# Patient Record
Sex: FEMALE | Race: WHITE | NOT HISPANIC OR LATINO | Employment: OTHER | URBAN - METROPOLITAN AREA
[De-identification: names, ages, dates, MRNs, and addresses within clinical notes are randomized per-mention and may not be internally consistent; named-entity substitution may affect disease eponyms.]

---

## 2017-01-09 ENCOUNTER — ALLSCRIPTS OFFICE VISIT (OUTPATIENT)
Dept: OTHER | Facility: OTHER | Age: 82
End: 2017-01-09

## 2017-01-09 ENCOUNTER — APPOINTMENT (OUTPATIENT)
Dept: LAB | Facility: HOSPITAL | Age: 82
End: 2017-01-09
Payer: COMMERCIAL

## 2017-01-09 DIAGNOSIS — D50.0 IRON DEFICIENCY ANEMIA DUE TO CHRONIC BLOOD LOSS: ICD-10-CM

## 2017-01-09 DIAGNOSIS — I10 ESSENTIAL (PRIMARY) HYPERTENSION: ICD-10-CM

## 2017-01-09 DIAGNOSIS — E03.9 HYPOTHYROIDISM: ICD-10-CM

## 2017-01-09 DIAGNOSIS — I77.6 ARTERITIS (HCC): ICD-10-CM

## 2017-01-09 DIAGNOSIS — Z01.818 ENCOUNTER FOR OTHER PREPROCEDURAL EXAMINATION: ICD-10-CM

## 2017-01-09 DIAGNOSIS — C16.9 MALIGNANT NEOPLASM OF STOMACH (HCC): ICD-10-CM

## 2017-01-09 LAB
ALBUMIN SERPL BCP-MCNC: 4 G/DL (ref 3.5–5)
ALP SERPL-CCNC: 80 U/L (ref 46–116)
ALT SERPL W P-5'-P-CCNC: 22 U/L (ref 12–78)
ANION GAP SERPL CALCULATED.3IONS-SCNC: 7 MMOL/L (ref 4–13)
AST SERPL W P-5'-P-CCNC: 22 U/L (ref 5–45)
BASOPHILS # BLD AUTO: 0.05 THOUSANDS/ΜL (ref 0–0.1)
BASOPHILS NFR BLD AUTO: 1 % (ref 0–1)
BILIRUB SERPL-MCNC: 0.41 MG/DL (ref 0.2–1)
BUN SERPL-MCNC: 17 MG/DL (ref 5–25)
CALCIUM SERPL-MCNC: 9.6 MG/DL (ref 8.3–10.1)
CHLORIDE SERPL-SCNC: 97 MMOL/L (ref 100–108)
CO2 SERPL-SCNC: 31 MMOL/L (ref 21–32)
CREAT SERPL-MCNC: 0.74 MG/DL (ref 0.6–1.3)
EOSINOPHIL # BLD AUTO: 0.23 THOUSAND/ΜL (ref 0–0.61)
EOSINOPHIL NFR BLD AUTO: 4 % (ref 0–6)
ERYTHROCYTE [DISTWIDTH] IN BLOOD BY AUTOMATED COUNT: 13.5 % (ref 11.6–15.1)
GFR SERPL CREATININE-BSD FRML MDRD: >60 ML/MIN/1.73SQ M
GLUCOSE SERPL-MCNC: 123 MG/DL (ref 65–140)
HCT VFR BLD AUTO: 36.3 % (ref 34.8–46.1)
HGB BLD-MCNC: 12 G/DL (ref 11.5–15.4)
LYMPHOCYTES # BLD AUTO: 1.27 THOUSANDS/ΜL (ref 0.6–4.47)
LYMPHOCYTES NFR BLD AUTO: 20 % (ref 14–44)
MCH RBC QN AUTO: 29.6 PG (ref 26.8–34.3)
MCHC RBC AUTO-ENTMCNC: 33.1 G/DL (ref 31.4–37.4)
MCV RBC AUTO: 89 FL (ref 82–98)
MONOCYTES # BLD AUTO: 0.73 THOUSAND/ΜL (ref 0.17–1.22)
MONOCYTES NFR BLD AUTO: 11 % (ref 4–12)
NEUTROPHILS # BLD AUTO: 4.1 THOUSANDS/ΜL (ref 1.85–7.62)
NEUTS SEG NFR BLD AUTO: 64 % (ref 43–75)
NRBC BLD AUTO-RTO: 0 /100 WBCS
PLATELET # BLD AUTO: 218 THOUSANDS/UL (ref 149–390)
PMV BLD AUTO: 9.6 FL (ref 8.9–12.7)
POTASSIUM SERPL-SCNC: 4.1 MMOL/L (ref 3.5–5.3)
PROT SERPL-MCNC: 7.7 G/DL (ref 6.4–8.2)
RBC # BLD AUTO: 4.06 MILLION/UL (ref 3.81–5.12)
SODIUM SERPL-SCNC: 135 MMOL/L (ref 136–145)
WBC # BLD AUTO: 6.39 THOUSAND/UL (ref 4.31–10.16)

## 2017-01-09 PROCEDURE — 85025 COMPLETE CBC W/AUTO DIFF WBC: CPT

## 2017-01-09 PROCEDURE — 36415 COLL VENOUS BLD VENIPUNCTURE: CPT

## 2017-01-09 PROCEDURE — 80053 COMPREHEN METABOLIC PANEL: CPT

## 2017-01-14 ENCOUNTER — GENERIC CONVERSION - ENCOUNTER (OUTPATIENT)
Dept: OTHER | Facility: OTHER | Age: 82
End: 2017-01-14

## 2017-01-17 ENCOUNTER — GENERIC CONVERSION - ENCOUNTER (OUTPATIENT)
Dept: OTHER | Facility: OTHER | Age: 82
End: 2017-01-17

## 2017-01-23 ENCOUNTER — GENERIC CONVERSION - ENCOUNTER (OUTPATIENT)
Dept: OTHER | Facility: OTHER | Age: 82
End: 2017-01-23

## 2017-02-07 ENCOUNTER — GENERIC CONVERSION - ENCOUNTER (OUTPATIENT)
Dept: OTHER | Facility: OTHER | Age: 82
End: 2017-02-07

## 2017-02-27 ENCOUNTER — ALLSCRIPTS OFFICE VISIT (OUTPATIENT)
Dept: OTHER | Facility: OTHER | Age: 82
End: 2017-02-27

## 2017-02-27 DIAGNOSIS — E03.9 HYPOTHYROIDISM: ICD-10-CM

## 2017-02-27 DIAGNOSIS — M25.561 PAIN IN RIGHT KNEE: ICD-10-CM

## 2017-03-09 ENCOUNTER — LAB REQUISITION (OUTPATIENT)
Dept: LAB | Facility: HOSPITAL | Age: 82
End: 2017-03-09
Payer: COMMERCIAL

## 2017-03-09 DIAGNOSIS — C16.3 MALIGNANT NEOPLASM OF PYLORIC ANTRUM (HCC): ICD-10-CM

## 2017-03-09 LAB
ALBUMIN SERPL BCP-MCNC: 3.7 G/DL (ref 3.5–5)
ALP SERPL-CCNC: 93 U/L (ref 46–116)
ALT SERPL W P-5'-P-CCNC: 16 U/L (ref 12–78)
ANION GAP SERPL CALCULATED.3IONS-SCNC: 8 MMOL/L (ref 4–13)
AST SERPL W P-5'-P-CCNC: 19 U/L (ref 5–45)
BASOPHILS # BLD AUTO: 0.06 THOUSANDS/ΜL (ref 0–0.1)
BASOPHILS NFR BLD AUTO: 1 % (ref 0–1)
BILIRUB SERPL-MCNC: 0.33 MG/DL (ref 0.2–1)
BUN SERPL-MCNC: 18 MG/DL (ref 5–25)
CALCIUM SERPL-MCNC: 9.2 MG/DL (ref 8.3–10.1)
CHLORIDE SERPL-SCNC: 99 MMOL/L (ref 100–108)
CO2 SERPL-SCNC: 27 MMOL/L (ref 21–32)
CREAT SERPL-MCNC: 0.79 MG/DL (ref 0.6–1.3)
EOSINOPHIL # BLD AUTO: 0.37 THOUSAND/ΜL (ref 0–0.61)
EOSINOPHIL NFR BLD AUTO: 7 % (ref 0–6)
ERYTHROCYTE [DISTWIDTH] IN BLOOD BY AUTOMATED COUNT: 15.2 % (ref 11.6–15.1)
FERRITIN SERPL-MCNC: 217 NG/ML (ref 8–388)
GFR SERPL CREATININE-BSD FRML MDRD: >60 ML/MIN/1.73SQ M
GLUCOSE SERPL-MCNC: 92 MG/DL (ref 65–140)
HCT VFR BLD AUTO: 33.1 % (ref 34.8–46.1)
HGB BLD-MCNC: 10.5 G/DL (ref 11.5–15.4)
LYMPHOCYTES # BLD AUTO: 0.99 THOUSANDS/ΜL (ref 0.6–4.47)
LYMPHOCYTES NFR BLD AUTO: 18 % (ref 14–44)
MCH RBC QN AUTO: 28.7 PG (ref 26.8–34.3)
MCHC RBC AUTO-ENTMCNC: 31.7 G/DL (ref 31.4–37.4)
MCV RBC AUTO: 90 FL (ref 82–98)
MONOCYTES # BLD AUTO: 0.67 THOUSAND/ΜL (ref 0.17–1.22)
MONOCYTES NFR BLD AUTO: 12 % (ref 4–12)
NEUTROPHILS # BLD AUTO: 3.5 THOUSANDS/ΜL (ref 1.85–7.62)
NEUTS SEG NFR BLD AUTO: 62 % (ref 43–75)
NRBC BLD AUTO-RTO: 0 /100 WBCS
PLATELET # BLD AUTO: 265 THOUSANDS/UL (ref 149–390)
PMV BLD AUTO: 9.3 FL (ref 8.9–12.7)
POTASSIUM SERPL-SCNC: 4.7 MMOL/L (ref 3.5–5.3)
PROT SERPL-MCNC: 7.3 G/DL (ref 6.4–8.2)
RBC # BLD AUTO: 3.66 MILLION/UL (ref 3.81–5.12)
SODIUM SERPL-SCNC: 134 MMOL/L (ref 136–145)
VIT B12 SERPL-MCNC: 559 PG/ML (ref 100–900)
WBC # BLD AUTO: 5.6 THOUSAND/UL (ref 4.31–10.16)

## 2017-03-09 PROCEDURE — 85025 COMPLETE CBC W/AUTO DIFF WBC: CPT | Performed by: INTERNAL MEDICINE

## 2017-03-09 PROCEDURE — 82607 VITAMIN B-12: CPT | Performed by: INTERNAL MEDICINE

## 2017-03-09 PROCEDURE — 80053 COMPREHEN METABOLIC PANEL: CPT | Performed by: INTERNAL MEDICINE

## 2017-03-09 PROCEDURE — 82728 ASSAY OF FERRITIN: CPT | Performed by: INTERNAL MEDICINE

## 2017-05-04 ENCOUNTER — ALLSCRIPTS OFFICE VISIT (OUTPATIENT)
Dept: OTHER | Facility: OTHER | Age: 82
End: 2017-05-04

## 2017-05-04 ENCOUNTER — APPOINTMENT (OUTPATIENT)
Dept: LAB | Facility: HOSPITAL | Age: 82
End: 2017-05-04
Payer: COMMERCIAL

## 2017-05-04 DIAGNOSIS — R60.0 LOCALIZED EDEMA: ICD-10-CM

## 2017-05-04 DIAGNOSIS — D50.0 IRON DEFICIENCY ANEMIA DUE TO CHRONIC BLOOD LOSS: ICD-10-CM

## 2017-05-04 DIAGNOSIS — I10 ESSENTIAL (PRIMARY) HYPERTENSION: ICD-10-CM

## 2017-05-04 LAB
ANION GAP SERPL CALCULATED.3IONS-SCNC: 6 MMOL/L (ref 4–13)
BUN SERPL-MCNC: 14 MG/DL (ref 5–25)
CALCIUM SERPL-MCNC: 9.3 MG/DL (ref 8.3–10.1)
CHLORIDE SERPL-SCNC: 96 MMOL/L (ref 100–108)
CO2 SERPL-SCNC: 30 MMOL/L (ref 21–32)
CREAT SERPL-MCNC: 0.74 MG/DL (ref 0.6–1.3)
GFR SERPL CREATININE-BSD FRML MDRD: >60 ML/MIN/1.73SQ M
GLUCOSE P FAST SERPL-MCNC: 166 MG/DL (ref 65–99)
NT-PROBNP SERPL-MCNC: 983 PG/ML
POTASSIUM SERPL-SCNC: 3.9 MMOL/L (ref 3.5–5.3)
SODIUM SERPL-SCNC: 132 MMOL/L (ref 136–145)

## 2017-05-04 PROCEDURE — 36415 COLL VENOUS BLD VENIPUNCTURE: CPT

## 2017-05-04 PROCEDURE — 83880 ASSAY OF NATRIURETIC PEPTIDE: CPT

## 2017-05-04 PROCEDURE — 80048 BASIC METABOLIC PNL TOTAL CA: CPT

## 2017-05-08 ENCOUNTER — HOSPITAL ENCOUNTER (OUTPATIENT)
Dept: NON INVASIVE DIAGNOSTICS | Facility: CLINIC | Age: 82
Discharge: HOME | End: 2017-05-08
Payer: COMMERCIAL

## 2017-05-08 DIAGNOSIS — R60.0 LOCALIZED EDEMA: ICD-10-CM

## 2017-05-08 DIAGNOSIS — I10 ESSENTIAL (PRIMARY) HYPERTENSION: ICD-10-CM

## 2017-05-08 PROCEDURE — 93970 EXTREMITY STUDY: CPT

## 2017-05-11 ENCOUNTER — ALLSCRIPTS OFFICE VISIT (OUTPATIENT)
Dept: OTHER | Facility: OTHER | Age: 82
End: 2017-05-11

## 2017-06-15 ENCOUNTER — LAB REQUISITION (OUTPATIENT)
Dept: LAB | Facility: HOSPITAL | Age: 82
End: 2017-06-15
Payer: COMMERCIAL

## 2017-06-15 DIAGNOSIS — M25.561 PAIN IN RIGHT KNEE: ICD-10-CM

## 2017-06-15 DIAGNOSIS — E03.9 HYPOTHYROIDISM: ICD-10-CM

## 2017-06-15 LAB
ANION GAP SERPL CALCULATED.3IONS-SCNC: 8 MMOL/L (ref 4–13)
BUN SERPL-MCNC: 10 MG/DL (ref 5–25)
CALCIUM SERPL-MCNC: 9.3 MG/DL (ref 8.3–10.1)
CHLORIDE SERPL-SCNC: 97 MMOL/L (ref 100–108)
CO2 SERPL-SCNC: 29 MMOL/L (ref 21–32)
CREAT SERPL-MCNC: 0.63 MG/DL (ref 0.6–1.3)
ERYTHROCYTE [DISTWIDTH] IN BLOOD BY AUTOMATED COUNT: 15.1 % (ref 11.6–15.1)
GFR SERPL CREATININE-BSD FRML MDRD: >60 ML/MIN/1.73SQ M
GLUCOSE SERPL-MCNC: 168 MG/DL (ref 65–140)
HCT VFR BLD AUTO: 36 % (ref 34.8–46.1)
HGB BLD-MCNC: 11.5 G/DL (ref 11.5–15.4)
MCH RBC QN AUTO: 28.3 PG (ref 26.8–34.3)
MCHC RBC AUTO-ENTMCNC: 31.9 G/DL (ref 31.4–37.4)
MCV RBC AUTO: 89 FL (ref 82–98)
PLATELET # BLD AUTO: 221 THOUSANDS/UL (ref 149–390)
PMV BLD AUTO: 9.9 FL (ref 8.9–12.7)
POTASSIUM SERPL-SCNC: 4.3 MMOL/L (ref 3.5–5.3)
RBC # BLD AUTO: 4.06 MILLION/UL (ref 3.81–5.12)
SODIUM SERPL-SCNC: 134 MMOL/L (ref 136–145)
TSH SERPL DL<=0.05 MIU/L-ACNC: 1.35 UIU/ML (ref 0.36–3.74)
WBC # BLD AUTO: 4.65 THOUSAND/UL (ref 4.31–10.16)

## 2017-06-15 PROCEDURE — 84443 ASSAY THYROID STIM HORMONE: CPT | Performed by: INTERNAL MEDICINE

## 2017-06-15 PROCEDURE — 85027 COMPLETE CBC AUTOMATED: CPT | Performed by: INTERNAL MEDICINE

## 2017-06-15 PROCEDURE — 80048 BASIC METABOLIC PNL TOTAL CA: CPT | Performed by: INTERNAL MEDICINE

## 2017-06-19 ENCOUNTER — ALLSCRIPTS OFFICE VISIT (OUTPATIENT)
Dept: OTHER | Facility: OTHER | Age: 82
End: 2017-06-19

## 2017-06-19 DIAGNOSIS — E78.5 HYPERLIPIDEMIA: ICD-10-CM

## 2017-06-19 DIAGNOSIS — E87.1 HYPO-OSMOLALITY AND HYPONATREMIA: ICD-10-CM

## 2017-06-19 DIAGNOSIS — R60.0 LOCALIZED EDEMA: ICD-10-CM

## 2017-06-19 DIAGNOSIS — I10 ESSENTIAL (PRIMARY) HYPERTENSION: ICD-10-CM

## 2017-06-19 DIAGNOSIS — R73.9 HYPERGLYCEMIA: ICD-10-CM

## 2017-07-27 ENCOUNTER — ALLSCRIPTS OFFICE VISIT (OUTPATIENT)
Dept: OTHER | Facility: OTHER | Age: 82
End: 2017-07-27

## 2017-07-27 ENCOUNTER — TRANSCRIBE ORDERS (OUTPATIENT)
Dept: LAB | Facility: HOSPITAL | Age: 82
End: 2017-07-27

## 2017-07-27 ENCOUNTER — APPOINTMENT (OUTPATIENT)
Dept: LAB | Facility: HOSPITAL | Age: 82
End: 2017-07-27
Attending: INTERNAL MEDICINE
Payer: COMMERCIAL

## 2017-07-27 DIAGNOSIS — R60.0 LOCALIZED EDEMA: ICD-10-CM

## 2017-07-27 DIAGNOSIS — I10 ESSENTIAL (PRIMARY) HYPERTENSION: ICD-10-CM

## 2017-07-27 DIAGNOSIS — E87.1 HYPO-OSMOLALITY AND HYPONATREMIA: ICD-10-CM

## 2017-07-27 DIAGNOSIS — E78.5 HYPERLIPIDEMIA: ICD-10-CM

## 2017-07-27 DIAGNOSIS — C16.9 MALIGNANT NEOPLASM OF STOMACH, UNSPECIFIED LOCATION (HCC): Primary | ICD-10-CM

## 2017-07-27 DIAGNOSIS — I87.321 IDIOPATHIC CHRONIC VENOUS HYPERTENSION OF RIGHT LOWER EXTREMITY WITH INFLAMMATION: ICD-10-CM

## 2017-07-27 DIAGNOSIS — R73.9 HYPERGLYCEMIA: ICD-10-CM

## 2017-07-27 LAB
ANION GAP SERPL CALCULATED.3IONS-SCNC: 9 MMOL/L (ref 4–13)
BUN SERPL-MCNC: 11 MG/DL (ref 5–25)
CALCIUM SERPL-MCNC: 9.2 MG/DL (ref 8.3–10.1)
CEA SERPL-MCNC: <0.5 NG/ML (ref 0–3)
CHLORIDE SERPL-SCNC: 93 MMOL/L (ref 100–108)
CO2 SERPL-SCNC: 28 MMOL/L (ref 21–32)
CREAT SERPL-MCNC: 0.55 MG/DL (ref 0.6–1.3)
ERYTHROCYTE [DISTWIDTH] IN BLOOD BY AUTOMATED COUNT: 13.8 % (ref 11.6–15.1)
EST. AVERAGE GLUCOSE BLD GHB EST-MCNC: 128 MG/DL
GFR SERPL CREATININE-BSD FRML MDRD: 85 ML/MIN/1.73SQ M
GLUCOSE P FAST SERPL-MCNC: 91 MG/DL (ref 65–99)
HBA1C MFR BLD: 6.1 % (ref 4.2–6.3)
HCT VFR BLD AUTO: 35.1 % (ref 34.8–46.1)
HGB BLD-MCNC: 11.6 G/DL (ref 11.5–15.4)
MCH RBC QN AUTO: 29 PG (ref 26.8–34.3)
MCHC RBC AUTO-ENTMCNC: 33 G/DL (ref 31.4–37.4)
MCV RBC AUTO: 88 FL (ref 82–98)
PLATELET # BLD AUTO: 222 THOUSANDS/UL (ref 149–390)
PMV BLD AUTO: 9.4 FL (ref 8.9–12.7)
POTASSIUM SERPL-SCNC: 3.8 MMOL/L (ref 3.5–5.3)
RBC # BLD AUTO: 4 MILLION/UL (ref 3.81–5.12)
SODIUM SERPL-SCNC: 130 MMOL/L (ref 136–145)
WBC # BLD AUTO: 5.11 THOUSAND/UL (ref 4.31–10.16)

## 2017-07-27 PROCEDURE — 80048 BASIC METABOLIC PNL TOTAL CA: CPT

## 2017-07-27 PROCEDURE — 36415 COLL VENOUS BLD VENIPUNCTURE: CPT

## 2017-07-27 PROCEDURE — 86301 IMMUNOASSAY TUMOR CA 19-9: CPT

## 2017-07-27 PROCEDURE — 82378 CARCINOEMBRYONIC ANTIGEN: CPT

## 2017-07-27 PROCEDURE — 85027 COMPLETE CBC AUTOMATED: CPT

## 2017-07-27 PROCEDURE — 83036 HEMOGLOBIN GLYCOSYLATED A1C: CPT

## 2017-07-28 LAB — CANCER AG19-9 SERPL-ACNC: 21 U/ML (ref 0–35)

## 2017-08-23 ENCOUNTER — APPOINTMENT (OUTPATIENT)
Dept: LAB | Facility: HOSPITAL | Age: 82
End: 2017-08-23
Attending: INTERNAL MEDICINE
Payer: COMMERCIAL

## 2017-08-23 DIAGNOSIS — I87.321 IDIOPATHIC CHRONIC VENOUS HYPERTENSION OF RIGHT LOWER EXTREMITY WITH INFLAMMATION: ICD-10-CM

## 2017-08-23 DIAGNOSIS — I10 ESSENTIAL (PRIMARY) HYPERTENSION: ICD-10-CM

## 2017-08-23 DIAGNOSIS — R60.0 LOCALIZED EDEMA: ICD-10-CM

## 2017-08-23 LAB
ANION GAP SERPL CALCULATED.3IONS-SCNC: 8 MMOL/L (ref 4–13)
BUN SERPL-MCNC: 19 MG/DL (ref 5–25)
CALCIUM SERPL-MCNC: 9.1 MG/DL (ref 8.3–10.1)
CHLORIDE SERPL-SCNC: 96 MMOL/L (ref 100–108)
CO2 SERPL-SCNC: 29 MMOL/L (ref 21–32)
CREAT SERPL-MCNC: 0.65 MG/DL (ref 0.6–1.3)
GFR SERPL CREATININE-BSD FRML MDRD: 81 ML/MIN/1.73SQ M
GLUCOSE P FAST SERPL-MCNC: 80 MG/DL (ref 65–99)
POTASSIUM SERPL-SCNC: 4 MMOL/L (ref 3.5–5.3)
SODIUM SERPL-SCNC: 133 MMOL/L (ref 136–145)

## 2017-08-23 PROCEDURE — 80048 BASIC METABOLIC PNL TOTAL CA: CPT

## 2017-08-23 PROCEDURE — 36415 COLL VENOUS BLD VENIPUNCTURE: CPT

## 2017-08-28 ENCOUNTER — ALLSCRIPTS OFFICE VISIT (OUTPATIENT)
Dept: OTHER | Facility: OTHER | Age: 82
End: 2017-08-28

## 2017-08-28 DIAGNOSIS — E78.5 HYPERLIPIDEMIA: ICD-10-CM

## 2017-08-28 DIAGNOSIS — E87.1 HYPO-OSMOLALITY AND HYPONATREMIA: ICD-10-CM

## 2017-08-28 DIAGNOSIS — E03.9 HYPOTHYROIDISM: ICD-10-CM

## 2017-08-28 DIAGNOSIS — I10 ESSENTIAL (PRIMARY) HYPERTENSION: ICD-10-CM

## 2017-09-27 ENCOUNTER — GENERIC CONVERSION - ENCOUNTER (OUTPATIENT)
Dept: OTHER | Facility: OTHER | Age: 82
End: 2017-09-27

## 2017-11-27 ENCOUNTER — GENERIC CONVERSION - ENCOUNTER (OUTPATIENT)
Dept: OTHER | Facility: OTHER | Age: 82
End: 2017-11-27

## 2017-12-13 ENCOUNTER — APPOINTMENT (OUTPATIENT)
Dept: LAB | Facility: HOSPITAL | Age: 82
End: 2017-12-13
Attending: INTERNAL MEDICINE
Payer: COMMERCIAL

## 2017-12-13 ENCOUNTER — GENERIC CONVERSION - ENCOUNTER (OUTPATIENT)
Dept: INTERNAL MEDICINE CLINIC | Facility: CLINIC | Age: 82
End: 2017-12-13

## 2017-12-13 DIAGNOSIS — E87.1 HYPO-OSMOLALITY AND HYPONATREMIA: ICD-10-CM

## 2017-12-13 DIAGNOSIS — E78.5 HYPERLIPIDEMIA: ICD-10-CM

## 2017-12-13 DIAGNOSIS — I10 ESSENTIAL (PRIMARY) HYPERTENSION: ICD-10-CM

## 2017-12-13 DIAGNOSIS — E03.9 HYPOTHYROIDISM: ICD-10-CM

## 2017-12-13 LAB
ANION GAP SERPL CALCULATED.3IONS-SCNC: 7 MMOL/L (ref 4–13)
BUN SERPL-MCNC: 17 MG/DL (ref 5–25)
CALCIUM SERPL-MCNC: 9.7 MG/DL (ref 8.3–10.1)
CHLORIDE SERPL-SCNC: 97 MMOL/L (ref 100–108)
CO2 SERPL-SCNC: 31 MMOL/L (ref 21–32)
CREAT SERPL-MCNC: 0.66 MG/DL (ref 0.6–1.3)
ERYTHROCYTE [DISTWIDTH] IN BLOOD BY AUTOMATED COUNT: 13.5 % (ref 11.6–15.1)
GFR SERPL CREATININE-BSD FRML MDRD: 80 ML/MIN/1.73SQ M
GLUCOSE SERPL-MCNC: 106 MG/DL (ref 65–140)
HCT VFR BLD AUTO: 37.5 % (ref 34.8–46.1)
HGB BLD-MCNC: 12.4 G/DL (ref 11.5–15.4)
MCH RBC QN AUTO: 30.2 PG (ref 26.8–34.3)
MCHC RBC AUTO-ENTMCNC: 33.1 G/DL (ref 31.4–37.4)
MCV RBC AUTO: 92 FL (ref 82–98)
PLATELET # BLD AUTO: 246 THOUSANDS/UL (ref 149–390)
PMV BLD AUTO: 10 FL (ref 8.9–12.7)
POTASSIUM SERPL-SCNC: 3.8 MMOL/L (ref 3.5–5.3)
RBC # BLD AUTO: 4.1 MILLION/UL (ref 3.81–5.12)
SODIUM SERPL-SCNC: 135 MMOL/L (ref 136–145)
TSH SERPL DL<=0.05 MIU/L-ACNC: 2.29 UIU/ML (ref 0.36–3.74)
WBC # BLD AUTO: 5.93 THOUSAND/UL (ref 4.31–10.16)

## 2017-12-13 PROCEDURE — 84443 ASSAY THYROID STIM HORMONE: CPT

## 2017-12-13 PROCEDURE — 85027 COMPLETE CBC AUTOMATED: CPT

## 2017-12-13 PROCEDURE — 80048 BASIC METABOLIC PNL TOTAL CA: CPT

## 2017-12-13 PROCEDURE — 36415 COLL VENOUS BLD VENIPUNCTURE: CPT

## 2017-12-19 ENCOUNTER — GENERIC CONVERSION - ENCOUNTER (OUTPATIENT)
Dept: OTHER | Facility: OTHER | Age: 82
End: 2017-12-19

## 2017-12-19 DIAGNOSIS — R73.9 HYPERGLYCEMIA: ICD-10-CM

## 2017-12-19 DIAGNOSIS — M17.10 PRIMARY OSTEOARTHRITIS OF ONE KNEE: ICD-10-CM

## 2017-12-19 DIAGNOSIS — I10 ESSENTIAL (PRIMARY) HYPERTENSION: ICD-10-CM

## 2017-12-19 DIAGNOSIS — E87.1 HYPO-OSMOLALITY AND HYPONATREMIA (CODE): ICD-10-CM

## 2018-01-10 NOTE — MISCELLANEOUS
History of Present Illness  TCM Communication [facility]: The patient is being contacted for follow-up after hospitalization and Pt's daughter declined to schedule GLADIS at this time  Daughter will call backat a later date  House visit offered and declined as well  She was hospitalized at [facility]  The date of admission: 1/19/17, date of discharge: 2/5/17  Diagnosis: ambulatory dysfunction, s/p TKR, labile blood pressure, primary osteoarthritis of right knee, hypothyroidism, HTN, GERD, venous insufficiency  She was discharged to home  Medications reviewed and updated today  She did not schedule a follow up appointment  She refused a follow up appointment due to Pt's daughter declined  Pt will be following up with her surgeon, has VNA and PT  Follow-up appointments with other specialists: "knee" doctor, PT  The patient is currently asymptomatic  Counseling was provided to patient's family  daughter  Communication performed and completed by nanda      Active Problems    1  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)   2  Anemia due to blood loss, chronic (280 0) (D50 0)   3  Benign essential hypertension (401 1) (I10)   4  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)   5  Encounter for pre-operative examination (V72 84) (Z01 818)   6  Hyperlipidemia (272 4) (E78 5)   7  Hyponatremia (276 1) (E87 1)   8  Hypothyroidism (244 9) (E03 9)   9  Knee pain, right (719 46) (M25 561)   10  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)   11  Osteoarthrosis of knee (715 36) (M17 9)   12  Vasculitis (447 6) (I77 6)    Past Medical History    1  History of Anemia (285 9) (D64 9)   2  History of Arthritis (V13 4)   3  History of Bilateral hearing loss (389 9) (H91 93)   4  History of Bilateral impacted cerumen (380 4) (H61 23)   5  History of Carcinoma Of The Stomach (V10 04)   6  History of Gastric Cancer (V10 04)   7  History of anemia (V12 3) (Z86 2)   8  History of fall (V15 88) (Z91 81)   9   History of fatigue (V13 89) (I84 963) 10  History of hypercholesterolemia (V12 29) (Z86 39)   11  History of hyperlipidemia (V12 29) (Z86 39)   12  History of hypertension (V12 59) (Z86 79)   13  History of impacted cerumen (V12 49) (Z86 69)   14  History of 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 3)   15  History of Senile Cataract (366 10)   16  History of Situational depression (309 0) (F43 21)   17  History of Urinary frequency (788 41) (R35 0)    Surgical History    1  History of Gastric Surgery   2  History of Total Knee Arthroplasty    Family History  Mother    1  Family history of Diabetes Mellitus (V18 0)   2  Family history of Heart Disease (V17 49)  Father    3  Family history of Prostate Cancer (V16 42)    Social History    · Daily Coffee Consumption (1  Cups/Day)   · Never A Smoker   · No alcohol use   · No illicit drug use    Current Meds   1  Acetaminophen 500 MG Oral Tablet; Take 1 tablet 4 times daily; Therapy: (Recorded:54Asu9152) to Recorded   2  AmLODIPine Besylate 5 MG Oral Tablet; take 0 5 tablet daily; Therapy: 34SBT2350 to (Jesica Dorado)  Requested for: 55HRR7053 Recorded   3  AmLODIPine Besylate 5 MG Oral Tablet; Take 1 tablet twice daily; Therapy: (Recorded:81Seu3506) to Recorded   4  Bystolic 10 MG Oral Tablet; Take 1 tablet daily  Requested for: 42SEE6775; Last   Rx:06Jan2017 Ordered   5  Centrum Silver Oral Tablet; TAKE 1 TABLET DAILY; Therapy: (Recorded:32Fxs6269) to Recorded   6  Ciloxan 0 3 % Ophthalmic Solution; take as directed; Therapy: 70SNZ1076 to Recorded   7  Fondaparinux Sodium 2 5 MG/0 5ML Subcutaneous Solution; INJECT 0 5 ML Daily 13   DOSES; Therapy: (Recorded:32Ksl5096) to Recorded   8  Levothyroxine Sodium 25 MCG Oral Tablet; TAKE 1 TABLET DAILY  Requested for:   20CNR5784; Last Rx:03Oct2016 Ordered   9  Nasacort Allergy 24HR 55 MCG/ACT Nasal Aerosol; To be used as directed; Therapy: 58VUR3215 to Recorded   10  Omeprazole 40 MG Oral Capsule Delayed Release; TAKE 1 CAPSULE DAILY;     Therapy: 96ZWB8009 to (Evaluate:26Mar2017)  Requested for: 17ZBP8873; Last    Rx:89Flw2461 Ordered   11  Polysaccharide Iron Complex 150 MG CAPS; Therapy: (Recorded:21Nxo7598) to Recorded   12  PrednisoLONE Acetate 1 % Ophthalmic Suspension; take as directed; Therapy: 76KOY0677 to Recorded   13  Senokot S 8 6-50 MG Oral Tablet; take 2 tablet twice daily; Therapy: (Recorded:84Yov9468) to Recorded   14  Systane SOLN; use as directed; Therapy: (Recorded:49Ssg4405) to Recorded   15  TraMADol HCl - 50 MG Oral Tablet; take 0 5-1 tab by mouth every 6 hours as needed for    pain; Therapy: (Recorded:56Lqj2315) to Recorded   16  Tylenol Extra Strength 500 MG Oral Tablet; Take 1 tablet twice daily; Therapy: (Recorded:96Ljh4389) to Recorded   17  Vitamin B-6 TABS; TAKE 2 times daily; Therapy: (Andi Porter) to Recorded   18  Vitamin D-3 1000 UNIT Oral Capsule; Take 1 tablet daily; Therapy: 36WSN9434 to Recorded   19  Zirgan 0 15 % Ophthalmic Gel; take as directed; Therapy: 90GDI3823 to Recorded    Allergies    1  No Known Drug Allergies    2  No Known Food Allergies   3  Seasonal    Health Management  History of Screening for depression   *VB-Depression Screening; every 1 year; Last 13HIP2549; Next Due: 51FCR1313; Near Due    Signatures   Electronically signed by : Sal Gan, ; Feb 7 2017 11:57AM EST                       (Co-author)    Electronically signed by :  Zakiya Koo MD; Feb 7 2017  4:54PM EST                       (Author)

## 2018-01-12 VITALS
TEMPERATURE: 97.5 F | DIASTOLIC BLOOD PRESSURE: 68 MMHG | OXYGEN SATURATION: 97 % | WEIGHT: 139.13 LBS | HEART RATE: 72 BPM | BODY MASS INDEX: 26.29 KG/M2 | SYSTOLIC BLOOD PRESSURE: 142 MMHG

## 2018-01-12 VITALS
OXYGEN SATURATION: 98 % | SYSTOLIC BLOOD PRESSURE: 130 MMHG | DIASTOLIC BLOOD PRESSURE: 80 MMHG | BODY MASS INDEX: 26.22 KG/M2 | HEART RATE: 60 BPM | WEIGHT: 142.5 LBS | TEMPERATURE: 98.5 F | HEIGHT: 62 IN

## 2018-01-13 VITALS
TEMPERATURE: 98.1 F | HEART RATE: 62 BPM | DIASTOLIC BLOOD PRESSURE: 68 MMHG | SYSTOLIC BLOOD PRESSURE: 152 MMHG | HEIGHT: 62 IN | BODY MASS INDEX: 25.28 KG/M2 | OXYGEN SATURATION: 98 % | WEIGHT: 137.38 LBS

## 2018-01-13 VITALS
DIASTOLIC BLOOD PRESSURE: 60 MMHG | BODY MASS INDEX: 26.7 KG/M2 | WEIGHT: 141.4 LBS | SYSTOLIC BLOOD PRESSURE: 146 MMHG | HEIGHT: 61 IN | RESPIRATION RATE: 16 BRPM | TEMPERATURE: 98.1 F | HEART RATE: 64 BPM

## 2018-01-13 NOTE — PROGRESS NOTES
History of Present Illness  ED Outreach:   ED Visit Information  ED visit date: 11/17/2017   Diagnosis description: UNSPECIFIED INJURY OF HEAD, INITIAL ENCOUNTER   Facility name: [facility]   Discharge status: Disch: Home (Routine disch )   Number of ED visits to date: 1  ED severity: 4  Out of network  Outreach Information  Outreach successful  Number of attempts - 1  Date finalized: 11/27/2017  Care Coordination  Emergent necessity warranted by diagnosis  [facility] PCP  Ambulance transport  (Patient refused EMS transport)   Did not call PCP first  Feels able to call PCP for urgent problems  Understands what emergencies can be handled by PCP  Does not have problems getting appointment with PCP for minor emergency/urgency problems  Follow up appointment with PCP  Date and time of follow up: 12/19/2017  Not seen for follow up out of network  Pt not admitted from ED  Comments:   Patient states that she is feeling well  Future Appointments    Date/Time Provider Specialty Site   12/19/2017 11:15 AM Bj Mejia MD Internal Medicine [facility]     Signatures   Electronically signed by :  Lucian Daigle, ; Nov 27 2017 11:01AM EST                       (Author)

## 2018-01-14 VITALS
OXYGEN SATURATION: 98 % | HEART RATE: 60 BPM | WEIGHT: 141.5 LBS | SYSTOLIC BLOOD PRESSURE: 144 MMHG | HEIGHT: 61 IN | DIASTOLIC BLOOD PRESSURE: 56 MMHG | TEMPERATURE: 97.7 F | BODY MASS INDEX: 26.71 KG/M2

## 2018-01-14 VITALS
OXYGEN SATURATION: 98 % | HEART RATE: 57 BPM | HEIGHT: 62 IN | BODY MASS INDEX: 25.67 KG/M2 | WEIGHT: 139.5 LBS | DIASTOLIC BLOOD PRESSURE: 72 MMHG | SYSTOLIC BLOOD PRESSURE: 160 MMHG | TEMPERATURE: 98.1 F

## 2018-01-14 VITALS
TEMPERATURE: 97.6 F | DIASTOLIC BLOOD PRESSURE: 64 MMHG | HEART RATE: 64 BPM | WEIGHT: 139.38 LBS | SYSTOLIC BLOOD PRESSURE: 150 MMHG | BODY MASS INDEX: 26.31 KG/M2 | HEIGHT: 61 IN | OXYGEN SATURATION: 97 %

## 2018-01-15 NOTE — CONSULTS
Chief Complaint  ********************* CLEARANCE FOR SURGERY*******************  Pt  here for pre-op  5/11/2016  Right total knee replacement   [facility]  Dr Chika Plascencia, [facility] [place]   Fax       History of Present Illness  Pre-Op Visit (Brief): The patient is being seen for a preoperative visit  Surgical Risk Assessment:   Prior Anesthesia: She had prior anesthesia, no prior adverse reaction to edidural anesthesia, no prior adverse reaction to spinal anesthesia, a prior adverse reaction to general anesthesia and nausea/vomiting  Pertinent Past Medical History: thyroid disease and wears dentures, but no angina, no arrhythmia, no CAD, CAD without prior MI, CAD without recent PCI, no CHF, no chronic liver disease, no acute hepatitis, no coagulation delay, no secondary hypercoagulable state, no pulmonary embolism, no DVT, does not use anticoagulants, no diabetes, no neck osteoarthrosis, no seizure disorder, no CVA, no asthma, no COPD, not ASHLEE, no renal disease and no obesity  Exercise Capacity: Pt states she does not use steps due to steps, but able to walk four blocks without symptoms  Lifestyle Factors: denies alcohol use, denies tobacco use and denies illegal drug use  Symptoms: no easy bleeding, no easy bruising, no chest pain, no cough, no dyspnea, edema, no palpitations and no wheezing  Living Situation: home is secure and supportive, no post-op concerns with her living situation and She will be going to rehab, has as care giver scheduled and daughter will also be around to help  HPI: Pt is here for pre-op clearance for R total knee replacement with [facility]  She has a PMH of HTN, HLD, hypothyroid, anemia, vasculitis, and hx of adenocarcinoma of stomach  She has been in remission since 2013  She has been cleared by vascular due to vasculitis per daughter - this was arranged by Ortho       She is doing well and has no c/o @ this time - no recent URI, fever/chills, abd pain, SOB, CP  She denies epistaxis, hemoptysis, melena  No dizziness or lightheadedness  She uses a walker or cane to ambulate    As for her HTN she does not typically check her BP @ home  Review of Systems    Constitutional: no fever, not feeling poorly, no chills and not feeling tired  Eyes: no eye pain, no eyesight problems and eyes not red  ENT: no earache, no nosebleeds, no sore throat and no nasal discharge  Cardiovascular: lower extremity edema, but the heart rate was not slow, no chest pain, the heart rate was not fast and no palpitations  Respiratory: no shortness of breath, no cough, no orthopnea, no wheezing and no shortness of breath during exertion  Gastrointestinal: no abdominal pain, no nausea, no vomiting, no constipation, no diarrhea and no blood in stools  Genitourinary: No increased urinary frequency, but no dysuria  Musculoskeletal: no myalgias    The patient presents with complaints of arthralgias (R knee)  Integumentary: no rashes  Neurological: no headache, no numbness, no tingling, no confusion, no dizziness, no limb weakness and no fainting  Active Problems    1  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)   2  Anemia (285 9) (D64 9)   3  Anemia due to blood loss, chronic (280 0) (D50 0)   4  Benign essential hypertension (401 1) (I10)   5  Bilateral hearing loss (389 9) (H91 93)   6  Bilateral impacted cerumen (380 4) (H61 23)   7  Cancer of antrum of stomach (151 2) (C16 3)   8  Fatigue (780 79) (R53 83)   9  Hyperlipidemia (272 4) (E78 5)   10  Hyponatremia (276 1) (E87 1)   11  Hypothyroidism (244 9) (E03 9)   12  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)   13   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 3)    Past Medical History    · History of Anemia (285 9) (D64 9)   · History of Arthritis (V13 4)   · History of Carcinoma Of The Stomach (V10 04)   · Denied: History of Carrier Of STD   · History of Encounter for screening colonoscopy (V76 51) (Z12 11)   · History of Gastric Cancer (V10 04)   · History of anemia (V12 3) (Z86 2)   · History of fall (V15 88) (Z91 81)   · History of hypercholesterolemia (V12 29) (Z86 39)   · History of hyperlipidemia (V12 29) (Z86 39)   · History of hypertension (V12 59) (Z86 79)   · History of impacted cerumen (V12 49) (Z86 69)   · Denied: History of Mental Status Change   · History of Need for pneumococcal vaccination (V03 82) (Z23)   · History of Need for prophylactic vaccination and inoculation against influenza (V04 81)  (Z23)   · History of Screening for genitourinary condition (V81 6) (Z13 89)   · History of Screening for neurological condition (V80 09) (Z13 89)   · History of Screening for osteoporosis (V82 81) (Z13 820)   · History of Senile Cataract (366 10)   · History of Situational depression (309 0) (F43 21)   · History of Urinary frequency (788 41) (R35 0)   · History of Visit for screening mammogram (V76 12) (Z12 31)    The active problems and past medical history were reviewed and updated today  Surgical History    · History of Gastric Surgery    The surgical history was reviewed and updated today  Family History    · Family history of Diabetes Mellitus (V18 0)   · Family history of Heart Disease (V17 49)    · Family history of Prostate Cancer (V16 42)    The family history was reviewed and updated today  Social History    · Denied: History of Alcohol Use (History)   · Daily Coffee Consumption (1  Cups/Day)   · Denied: History of Drug Use   · Never A Smoker  The social history was reviewed and updated today  The social history was reviewed and is unchanged  Current Meds   1  AmLODIPine Besylate 5 MG Oral Tablet; TAKE 1 TABLET DAILY; Therapy: 89NPW4417 to (Alexandra Pompa)  Requested for: 24Mar2016; Last   Rx:24Mar2016 Ordered   2  Bystolic 10 MG Oral Tablet; Take 1 tablet daily  Requested for: 01YRE1606; Last   Rx:24Mar2016 Ordered   3   Bystolic 10 MG Oral Tablet; TAKE 1 TABLET DAILY; Therapy: (Recorded:09Cao8789) to Recorded   4  Centrum Silver Oral Tablet; TAKE 1 TABLET DAILY; Therapy: (Recorded:78Any6225) to Recorded   5  Ciloxan 0 3 % Ophthalmic Solution; take as directed; Therapy: 74WYZ0600 to Recorded   6  CloNIDine HCl - 0 1 MG Oral Tablet; TAKE 1 TABLET TWICE DAILY  Requested for:   32IZX6165; Last Rx:24Mar2016 Ordered   7  Ferrous Sulfate 325 (65 Fe) MG Oral Tablet; TAKE 1 TABLET DAILY AS DIRECTED; Therapy: 83WVP3632 to Recorded   8  Furosemide 40 MG Oral Tablet; TAKE 1 TABLET DAILY; Therapy: 83VPC9441 to (Mat Nolen)  Requested for: 93HEL2545; Last   Rx:24Mar2016 Ordered   9  Levothyroxine Sodium 25 MCG Oral Tablet; TAKE 1 TABLET DAILY  Requested for:   90VLL2987; Last Rx:24Mar2016 Ordered   10  Omeprazole 40 MG Oral Capsule Delayed Release; TAKE 1 CAPSULE DAILY; Therapy: 45VCH4283 to (Mat Nolen)  Requested for: 14NPR2630; Last    Rx:24Mar2016 Ordered   11  PrednisoLONE Acetate 1 % Ophthalmic Suspension; take as directed; Therapy: 20ECL1429 to Recorded   12  Systane SOLN; use as directed; Therapy: (Recorded:35Yln4246) to Recorded   13  Tylenol Extra Strength 500 MG Oral Tablet; Take 1 tablet twice daily; Therapy: (Recorded:20Esb4542) to Recorded   14  Vitamin B-6 TABS; TAKE 2 times daily; Therapy: (Clifford Padilla) to Recorded   15  Vitamin D-3 1000 UNIT Oral Capsule; Take 1 tablet daily; Therapy: 43EBF4697 to Recorded   16  Zirgan 0 15 % Ophthalmic Gel; take as directed; Therapy: 17DEA2238 to Recorded    The medication list was reviewed and updated today  Allergies    1  No Known Drug Allergies    2  No Known Food Allergies   3   Seasonal    Vitals  Signs [Data Includes: Current Encounter]    Systolic: 405  Diastolic: 90   Temperature: 97 8 F, Oral  Heart Rate: 77  Systolic: 170, LUE, Sitting  Diastolic: 88, LUE, Sitting  Weight: 140 lb   BMI Calculated: 26 02  BSA Calculated: 1 63  O2 Saturation: 97    Physical Exam    Constitutional   General appearance: No acute distress, well appearing and well nourished  pleasant, cooperative  Head and Face   Head and face: Normal     Palpation of the face and sinuses: No sinus tenderness  Eyes   Conjunctiva and lids: No swelling, erythema or discharge  Pupils and irises: Equal, round, reactive to light  Ears, Nose, Mouth, and Throat   External inspection of ears and nose: Normal     Otoscopic examination: Tympanic membranes translucent with normal light reflex  Canals patent without erythema  Gross hearing intact  Nasal mucosa, septum, and turbinates: Normal without edema or erythema  Lips, teeth, and gums: Normal, good dentition  Oropharynx: Normal with no erythema, edema, exudate or lesions  MMM  Neck   Neck: Supple, symmetric, trachea midline, no masses  Thyroid: Normal, no thyromegaly  Pulmonary   Respiratory effort: No increased work of breathing or signs of respiratory distress  Auscultation of lungs: Clear to auscultation  No rhonchi or wheezing  Cardiovascular   Auscultation of heart: Normal rate and rhythm, normal S1 and S2, no murmurs  1+ pitting edema, spider veins noted to R shin  Abdomen   Abdomen: Non-tender, no masses  Lymphatic   Palpation of lymph nodes in neck: No lymphadenopathy  Musculoskeletal   Gait and station: Abnormal   gait affected by RLE, using cane, slow  Digits and nails: Normal without clubbing or cyanosis  Skin   Skin and subcutaneous tissue: Normal without rashes or lesions  Palpation of skin and subcutaneous tissue: Normal turgor  Neurologic   Cranial nerves: Cranial nerves II-XII intact  Cortical function: Normal mental status  Sensation: No sensory loss  Psychiatric   Judgment and insight: Normal     Orientation to person, place, and time: Normal     Recent and remote memory: Intact  Mood and affect: Normal        Assessment    1   Encounter for pre-operative examination (M98 75) (Z01 818)   2  Degenerative arthritis of right knee (715 96) (M17 9)   3  Anemia (285 9) (D64 9)   4  Benign essential hypertension (401 1) (I10)   5  Hyperlipidemia (272 4) (E78 5)   6  Hypothyroidism (244 9) (E03 9)   7  History of stomach cancer (V10 04) (Z85 028)   8  Vasculitis (447 6) (I77 6)    Discussion/Summary  Surgical Clearance: She is at a MODERATE TO HIGH risk from a cardiovascular standpoint at this time without any additional cardiac testing  Reevaluation needed, if she should present with symptoms prior to surgery/procedure  Pt cleared for surgery with moderate to high risk  Lab work reviewed  Pt is slightly anemic with hgb 10 4, hgb 11 4 2/16  She does have a hx of anemia and takes anemia  No s/sx of active bleeding  Additionally her blood pressure was slightly elevated today, she is due to take her norvasc @ this time  She does not typically monitor her BP @ home  Pt to monitor @ home until surgery and return for elevations or if new s/sx develop  Pt and daughter agree and understand  She has a f/u appt scheduled as well  End of Encounter Meds    1  AmLODIPine Besylate 5 MG Oral Tablet; TAKE 1 TABLET DAILY; Therapy: 67NLV2163 to (Louann Ferro)  Requested for: 24Mar2016; Last   Rx:24Mar2016 Ordered   2  Bystolic 10 MG Oral Tablet; Take 1 tablet daily  Requested for: 20KGZ2604; Last   Rx:24Mar2016 Ordered   3  CloNIDine HCl - 0 1 MG Oral Tablet (Catapres); TAKE 1 TABLET TWICE DAILY    Requested for: 01WZI3448; Last Rx:24Mar2016 Ordered    4  Furosemide 40 MG Oral Tablet; TAKE 1 TABLET DAILY; Therapy: 67CAR1315 to (Louann Ferro)  Requested for: 33FPS9973; Last   Rx:24Mar2016 Ordered    5  Levothyroxine Sodium 25 MCG Oral Tablet; TAKE 1 TABLET DAILY  Requested for:   34SEN5329; Last Rx:24Mar2016 Ordered    6  Omeprazole 40 MG Oral Capsule Delayed Release; TAKE 1 CAPSULE DAILY;    Therapy: 39KRA0972 to (Louann Ferro)  Requested for: 68WDL3746; Last   Rx:24Mar2016 Ordered    7  Bystolic 10 MG Oral Tablet; TAKE 1 TABLET DAILY; Therapy: (Recorded:89Rkv8516) to Recorded   8  Centrum Silver Oral Tablet; TAKE 1 TABLET DAILY; Therapy: (Recorded:46Msn5404) to Recorded   9  Ciloxan 0 3 % Ophthalmic Solution (Ciprofloxacin HCl); take as directed; Therapy: 62JWR6866 to Recorded   10  Ferrous Sulfate 325 (65 Fe) MG Oral Tablet; TAKE 1 TABLET DAILY AS DIRECTED; Therapy: 96JKF5440 to Recorded   11  PrednisoLONE Acetate 1 % Ophthalmic Suspension; take as directed; Therapy: 33KQM1115 to Recorded   12  Systane SOLN; use as directed; Therapy: (Recorded:33Aup8553) to Recorded   13  Tylenol Extra Strength 500 MG Oral Tablet; Take 1 tablet twice daily; Therapy: (Recorded:85Okz7653) to Recorded   14  Vitamin B-6 TABS; TAKE 2 times daily; Therapy: (Brook Adas) to Recorded   15  Vitamin D-3 1000 UNIT Oral Capsule; Take 1 tablet daily; Therapy: 84MYR7373 to Recorded   16  Zirgan 0 15 % Ophthalmic Gel; take as directed; Therapy: 18RMO6656 to Recorded    Signatures   Electronically signed by :  [employer], [facility], [place]; May  6 2016 12:25PM EST                       (Author)    Electronically signed by : Shelia Hernández DO; May  6 2016 12:38PM EST

## 2018-01-18 NOTE — PROGRESS NOTES
Assessment    1  Encounter for pre-operative examination (V72 84) (Z01 818)   2  Degenerative arthritis of right knee (715 96) (M17 9)   3  Anemia (285 9) (D64 9)   4  Benign essential hypertension (401 1) (I10)   5  Hyperlipidemia (272 4) (E78 5)   6  Hypothyroidism (244 9) (E03 9)   7  History of stomach cancer (V10 04) (Z85 028)   8  Vasculitis (447 6) (I77 6)    Discussion/Summary  Surgical Clearance: She is at a MODERATE TO HIGH risk from a cardiovascular standpoint at this time without any additional cardiac testing  Reevaluation needed, if she should present with symptoms prior to surgery/procedure  Pt cleared for surgery with moderate to high risk  Lab work reviewed  Pt is slightly anemic with hgb 10 4, hgb 11 4 2/16  She does have a hx of anemia and takes anemia  No s/sx of active bleeding  Additionally her blood pressure was slightly elevated today, she is due to take her norvasc @ this time  She does not typically monitor her BP @ home  Pt to monitor @ home until surgery and return for elevations or if new s/sx develop  Pt and daughter agree and understand  She has a f/u appt scheduled as well  Chief Complaint  ********************* CLEARANCE FOR SURGERY*******************  Pt  here for pre-op  5/11/2016  Right total knee replacement   [facility]  Dr Liliana Rivera, [facility] [place]   Fax       History of Present Illness  Pre-Op Visit (Brief): The patient is being seen for a preoperative visit  Surgical Risk Assessment:   Prior Anesthesia: She had prior anesthesia, no prior adverse reaction to edidural anesthesia, no prior adverse reaction to spinal anesthesia, a prior adverse reaction to general anesthesia and nausea/vomiting   Pertinent Past Medical History: thyroid disease and wears dentures, but no angina, no arrhythmia, no CAD, CAD without prior MI, CAD without recent PCI, no CHF, no chronic liver disease, no acute hepatitis, no coagulation delay, no secondary hypercoagulable state, no pulmonary embolism, no DVT, does not use anticoagulants, no diabetes, no neck osteoarthrosis, no seizure disorder, no CVA, no asthma, no COPD, not ASHLEE, no renal disease and no obesity  Exercise Capacity: Pt states she does not use steps due to steps, but able to walk four blocks without symptoms  Lifestyle Factors: denies alcohol use, denies tobacco use and denies illegal drug use  Symptoms: no easy bleeding, no easy bruising, no chest pain, no cough, no dyspnea, edema, no palpitations and no wheezing  Living Situation: home is secure and supportive, no post-op concerns with her living situation and She will be going to rehab, has as care giver scheduled and daughter will also be around to help  HPI: Pt is here for pre-op clearance for R total knee replacement with [facility]  She has a PMH of HTN, HLD, hypothyroid, anemia, vasculitis, and hx of adenocarcinoma of stomach  She has been in remission since 2013  She has been cleared by vascular due to vasculitis per daughter - this was arranged by Ortho  She is doing well and has no c/o @ this time - no recent URI, fever/chills, abd pain, SOB, CP  She denies epistaxis, hemoptysis, melena  No dizziness or lightheadedness  She uses a walker or cane to ambulate    As for her HTN she does not typically check her BP @ home  Review of Systems    Constitutional: no fever, not feeling poorly, no chills and not feeling tired  Eyes: no eye pain, no eyesight problems and eyes not red  ENT: no earache, no nosebleeds, no sore throat and no nasal discharge  Cardiovascular: lower extremity edema, but the heart rate was not slow, no chest pain, the heart rate was not fast and no palpitations  Respiratory: no shortness of breath, no cough, no orthopnea, no wheezing and no shortness of breath during exertion     Gastrointestinal: no abdominal pain, no nausea, no vomiting, no constipation, no diarrhea and no blood in stools  Genitourinary: No increased urinary frequency, but no dysuria  Musculoskeletal: no myalgias    The patient presents with complaints of arthralgias (R knee)  Integumentary: no rashes  Neurological: no headache, no numbness, no tingling, no confusion, no dizziness, no limb weakness and no fainting  Active Problems    1  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)   2  Anemia (285 9) (D64 9)   3  Anemia due to blood loss, chronic (280 0) (D50 0)   4  Benign essential hypertension (401 1) (I10)   5  Bilateral hearing loss (389 9) (H91 93)   6  Bilateral impacted cerumen (380 4) (H61 23)   7  Cancer of antrum of stomach (151 2) (C16 3)   8  Fatigue (780 79) (R53 83)   9  Hyperlipidemia (272 4) (E78 5)   10  Hyponatremia (276 1) (E87 1)   11  Hypothyroidism (244 9) (E03 9)   12  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)   13   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)    Past Medical History    · History of Anemia (285 9) (D64 9)   · History of Arthritis (V13 4)   · History of Carcinoma Of The Stomach (V10 04)   · Denied: History of Carrier Of STD   · History of Encounter for screening colonoscopy (V76 51) (Z12 11)   · History of Gastric Cancer (V10 04)   · History of anemia (V12 3) (Z86 2)   · History of fall (V15 88) (Z91 81)   · History of hypercholesterolemia (V12 29) (Z86 39)   · History of hyperlipidemia (V12 29) (Z86 39)   · History of hypertension (V12 59) (Z86 79)   · History of impacted cerumen (V12 49) (Z86 69)   · Denied: History of Mental Status Change   · History of Need for pneumococcal vaccination (V03 82) (Z23)   · History of Need for prophylactic vaccination and inoculation against influenza (V04 81)  (Z23)   · History of Screening for genitourinary condition (V81 6) (Z13 89)   · History of Screening for neurological condition (V80 09) (Z13 89)   · History of Screening for osteoporosis (V82 81) (S24 444)   · History of Senile Cataract (366 10)   · History of Situational depression (309 0) (F43 21)   · History of Urinary frequency (788 41) (R35 0)   · History of Visit for screening mammogram (V76 12) (Z12 31)    The active problems and past medical history were reviewed and updated today  Surgical History    · History of Gastric Surgery    The surgical history was reviewed and updated today  Family History  Mother    · Family history of Diabetes Mellitus (V18 0)   · Family history of Heart Disease (V17 49)  Father    · Family history of Prostate Cancer (V16 42)    The family history was reviewed and updated today  Social History    · Denied: History of Alcohol Use (History)   · Daily Coffee Consumption (1  Cups/Day)   · Denied: History of Drug Use   · Never A Smoker  The social history was reviewed and updated today  The social history was reviewed and is unchanged  Current Meds   1  AmLODIPine Besylate 5 MG Oral Tablet; TAKE 1 TABLET DAILY; Therapy: 65RHA0139 to (Blanca Mesa)  Requested for: 24Mar2016; Last   Rx:24Mar2016 Ordered   2  Bystolic 10 MG Oral Tablet; Take 1 tablet daily  Requested for: 68IGL7273; Last   Rx:24Mar2016 Ordered   3  Bystolic 10 MG Oral Tablet; TAKE 1 TABLET DAILY; Therapy: (Recorded:42Kwi2951) to Recorded   4  Centrum Silver Oral Tablet; TAKE 1 TABLET DAILY; Therapy: (Recorded:16Lht4415) to Recorded   5  Ciloxan 0 3 % Ophthalmic Solution; take as directed; Therapy: 79NHD3022 to Recorded   6  CloNIDine HCl - 0 1 MG Oral Tablet; TAKE 1 TABLET TWICE DAILY  Requested for:   00PWI4619; Last Rx:24Mar2016 Ordered   7  Ferrous Sulfate 325 (65 Fe) MG Oral Tablet; TAKE 1 TABLET DAILY AS DIRECTED; Therapy: 07NED5635 to Recorded   8  Furosemide 40 MG Oral Tablet; TAKE 1 TABLET DAILY; Therapy: 64LIL4605 to (Blanca Mesa)  Requested for: 85XOA0641; Last   Rx:24Mar2016 Ordered   9  Levothyroxine Sodium 25 MCG Oral Tablet; TAKE 1 TABLET DAILY  Requested for:   58SOR4496; Last Rx:24Mar2016 Ordered   10   Omeprazole 40 MG Oral Capsule Delayed Release; TAKE 1 CAPSULE DAILY; Therapy: 65SRM1838 to (Sandra Zapien)  Requested for: 96THL8178; Last    Rx:24Mar2016 Ordered   11  PrednisoLONE Acetate 1 % Ophthalmic Suspension; take as directed; Therapy: 97ZKQ6856 to Recorded   12  Systane SOLN; use as directed; Therapy: (Recorded:08Bec7262) to Recorded   13  Tylenol Extra Strength 500 MG Oral Tablet; Take 1 tablet twice daily; Therapy: (Recorded:99Pvc7438) to Recorded   14  Vitamin B-6 TABS; TAKE 2 times daily; Therapy: (Jeanmarie Began) to Recorded   15  Vitamin D-3 1000 UNIT Oral Capsule; Take 1 tablet daily; Therapy: 29GDQ5344 to Recorded   16  Zirgan 0 15 % Ophthalmic Gel; take as directed; Therapy: 66ICY8047 to Recorded    The medication list was reviewed and updated today  Allergies    1  No Known Drug Allergies    2  No Known Food Allergies   3  Seasonal    Vitals   Recorded: 94SQO9105 12:09PM Recorded: 65ZCV0092 11:29AM   Temperature  97 8 F, Oral   Heart Rate  77   Systolic 792 748, LUE, Sitting   Diastolic 90 88, LUE, Sitting   Weight  140 lb    BMI Calculated  26 02   BSA Calculated  1 63   O2 Saturation  97     Physical Exam    Constitutional   General appearance: No acute distress, well appearing and well nourished  pleasant, cooperative  Head and Face   Head and face: Normal     Palpation of the face and sinuses: No sinus tenderness  Eyes   Conjunctiva and lids: No swelling, erythema or discharge  Pupils and irises: Equal, round, reactive to light  Ears, Nose, Mouth, and Throat   External inspection of ears and nose: Normal     Otoscopic examination: Tympanic membranes translucent with normal light reflex  Canals patent without erythema  Gross hearing intact  Nasal mucosa, septum, and turbinates: Normal without edema or erythema  Lips, teeth, and gums: Normal, good dentition  Oropharynx: Normal with no erythema, edema, exudate or lesions  MMM     Neck   Neck: Supple, symmetric, trachea midline, no masses  Thyroid: Normal, no thyromegaly  Pulmonary   Respiratory effort: No increased work of breathing or signs of respiratory distress  Auscultation of lungs: Clear to auscultation  No rhonchi or wheezing  Cardiovascular   Auscultation of heart: Normal rate and rhythm, normal S1 and S2, no murmurs  1+ pitting edema, spider veins noted to R shin  Abdomen   Abdomen: Non-tender, no masses  Lymphatic   Palpation of lymph nodes in neck: No lymphadenopathy  Musculoskeletal   Gait and station: Abnormal   gait affected by RLE, using cane, slow  Digits and nails: Normal without clubbing or cyanosis  Skin   Skin and subcutaneous tissue: Normal without rashes or lesions  Palpation of skin and subcutaneous tissue: Normal turgor  Neurologic   Cranial nerves: Cranial nerves II-XII intact  Cortical function: Normal mental status  Sensation: No sensory loss  Psychiatric   Judgment and insight: Normal     Orientation to person, place, and time: Normal     Recent and remote memory: Intact  Mood and affect: Normal        End of Encounter Meds    1  AmLODIPine Besylate 5 MG Oral Tablet; TAKE 1 TABLET DAILY; Therapy: 14CRO5826 to (Boni Toledo)  Requested for: 24Mar2016; Last   Rx:24Mar2016 Ordered   2  Bystolic 10 MG Oral Tablet; Take 1 tablet daily  Requested for: 06VLA7942; Last   Rx:24Mar2016 Ordered   3  CloNIDine HCl - 0 1 MG Oral Tablet (Catapres); TAKE 1 TABLET TWICE DAILY    Requested for: 52UCT4629; Last Rx:24Mar2016 Ordered    4  Furosemide 40 MG Oral Tablet; TAKE 1 TABLET DAILY; Therapy: 37XSI7251 to (Boni Toledo)  Requested for: 87IMT1479; Last   Rx:24Mar2016 Ordered    5  Levothyroxine Sodium 25 MCG Oral Tablet; TAKE 1 TABLET DAILY  Requested for:   19PVT4888; Last Rx:24Mar2016 Ordered    6  Omeprazole 40 MG Oral Capsule Delayed Release; TAKE 1 CAPSULE DAILY;    Therapy: 92NRX5750 to (Boni Toledo)  Requested for: 08WNP2787; Last Rx:24Dio7508 Ordered    7  Bystolic 10 MG Oral Tablet; TAKE 1 TABLET DAILY; Therapy: (Recorded:63Rsn7176) to Recorded   8  Centrum Silver Oral Tablet; TAKE 1 TABLET DAILY; Therapy: (Recorded:27Myk3528) to Recorded   9  Ciloxan 0 3 % Ophthalmic Solution (Ciprofloxacin HCl); take as directed; Therapy: 89FRE3800 to Recorded   10  Ferrous Sulfate 325 (65 Fe) MG Oral Tablet; TAKE 1 TABLET DAILY AS DIRECTED; Therapy: 39AUG3554 to Recorded   11  PrednisoLONE Acetate 1 % Ophthalmic Suspension; take as directed; Therapy: 12FKI9406 to Recorded   12  Systane SOLN; use as directed; Therapy: (Recorded:49Glo9542) to Recorded   13  Tylenol Extra Strength 500 MG Oral Tablet; Take 1 tablet twice daily; Therapy: (Recorded:45Spr7134) to Recorded   14  Vitamin B-6 TABS; TAKE 2 times daily; Therapy: (April Borrow) to Recorded   15  Vitamin D-3 1000 UNIT Oral Capsule; Take 1 tablet daily; Therapy: 78MES5353 to Recorded   16  Zirgan 0 15 % Ophthalmic Gel; take as directed; Therapy: 32JBN1912 to Recorded    Future Appointments    Date/Time Provider Specialty Site   06/22/2016 10:45 AM Maryam Benson MD Internal Medicine [facility] PRIMARY CARE     Signatures   Electronically signed by :  [employer] [place], [address] [place] [address]; May  6 2016 12:25PM EST                       (Author)    Electronically signed by : Tanner Chuo DO; May  6 2016 12:37PM EST

## 2018-01-18 NOTE — MISCELLANEOUS
Assessment    1  History of Total Knee Arthroplasty    History of Present Illness  TCM Communication [facility]: The patient is being contacted for follow-up after hospitalization and Pt transferred to [facility]  No GLADIS needed at this time  She was hospitalized at [facility]  The date of admission: 1/17/17, date of discharge: 1/19/17  Diagnosis: DJD of RLE, HTN, GERD, esophagitis, venous insufficiency, hypothyroidism, RTKR  She was discharged to a rehabilitation center  Medications were not reviewed today  She did not schedule a follow up appointment  Communication performed and completed by kb      Active Problems    1  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)   2  Anemia due to blood loss, chronic (280 0) (D50 0)   3  Benign essential hypertension (401 1) (I10)   4  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)   5  Encounter for pre-operative examination (V72 84) (Z01 818)   6  Hyperlipidemia (272 4) (E78 5)   7  Hyponatremia (276 1) (E87 1)   8  Hypothyroidism (244 9) (E03 9)   9  Knee pain, right (719 46) (M25 561)   10  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)   11  Osteoarthrosis of knee (715 36) (M17 9)   12  Vasculitis (447 6) (I77 6)    Past Medical History    1  History of Anemia (285 9) (D64 9)   2  History of Arthritis (V13 4)   3  History of Bilateral hearing loss (389 9) (H91 93)   4  History of Bilateral impacted cerumen (380 4) (H61 23)   5  History of Carcinoma Of The Stomach (V10 04)   6  History of Gastric Cancer (V10 04)   7  History of anemia (V12 3) (Z86 2)   8  History of fall (V15 88) (Z91 81)   9  History of fatigue (V13 89) (Z87 898)   10  History of hypercholesterolemia (V12 29) (Z86 39)   11  History of hyperlipidemia (V12 29) (Z86 39)   12  History of hypertension (V12 59) (Z86 79)   13  History of impacted cerumen (V12 49) (Z86 69)   14  History of 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 3)   15  History of Senile Cataract (366 10)   16  History of Situational depression (309 0) (F43 21)   17  History of Urinary frequency (788 41) (R35 0)    Surgical History    1  History of Gastric Surgery    Family History  Mother    1  Family history of Diabetes Mellitus (V18 0)   2  Family history of Heart Disease (V17 49)  Father    3  Family history of Prostate Cancer (V16 42)    Social History    · Daily Coffee Consumption (1  Cups/Day)   · Never A Smoker   · No alcohol use   · No illicit drug use    Current Meds   1  AmLODIPine Besylate 5 MG Oral Tablet; take 0 5 tablet daily; Therapy: 25PVY5638 to (Alvina García)  Requested for: 97QMI5712 Recorded   2  Bystolic 10 MG Oral Tablet; Take 1 tablet daily  Requested for: 62NPQ8672; Last   Rx:06Jan2017 Ordered   3  Centrum Silver Oral Tablet; TAKE 1 TABLET DAILY; Therapy: (Recorded:75Tol0211) to Recorded   4  Ciloxan 0 3 % Ophthalmic Solution; take as directed; Therapy: 53EKQ9613 to Recorded   5  CloNIDine HCl - 0 1 MG Oral Tablet; TAKE 1 TABLET TWICE DAILY  Requested for:   69WCG0094; Last Rx:84Awk6427 Ordered   6  Ferrous Sulfate 325 (65 Fe) MG Oral Tablet; TAKE 1 TABLET DAILY AS DIRECTED; Therapy: 40TAB4429 to Recorded   7  Furosemide 40 MG Oral Tablet; TAKE 1 TABLET DAILY; Therapy: 21OIJ4504 to (Evaluate:01Apr2017)  Requested for: 13NOS8624; Last   Rx:03Oct2016 Ordered   8  Levothyroxine Sodium 25 MCG Oral Tablet; TAKE 1 TABLET DAILY  Requested for:   16HBT8963; Last Rx:74Itx9017 Ordered   9  Nasacort Allergy 24HR 55 MCG/ACT Nasal Aerosol; To be used as directed; Therapy: 46YPN5560 to Recorded   10  Omeprazole 40 MG Oral Capsule Delayed Release; TAKE 1 CAPSULE DAILY; Therapy: 82LCR4480 to (Evaluate:26Mar2017)  Requested for: 56LBM8909; Last    Rx:24Ipg6470 Ordered   11  PrednisoLONE Acetate 1 % Ophthalmic Suspension; take as directed; Therapy: 07XQX2844 to Recorded   12  Systane SOLN; use as directed; Therapy: (Recorded:42Emy7885) to Recorded   13  Tylenol Extra Strength 500 MG Oral Tablet; Take 1 tablet twice daily;     Therapy: (Recorded:37Ipa2982) to Recorded   14  Vitamin B-6 TABS; TAKE 2 times daily; Therapy: (Darya Reed) to Recorded   15  Vitamin D-3 1000 UNIT Oral Capsule; Take 1 tablet daily; Therapy: 81RWT3104 to Recorded   16  Zirgan 0 15 % Ophthalmic Gel; take as directed; Therapy: 47RQZ2509 to Recorded    Allergies    1  No Known Drug Allergies    2  No Known Food Allergies   3  Seasonal    Health Management  History of Screening for depression   *VB-Depression Screening; every 1 year; Last 03FZN1382; Next Due: 26ESM3060; Near Due    Signatures   Electronically signed by : Eliana Sanchez, ; Jan 23 2017  3:58PM EST                       (Co-author)    Electronically signed by :  Chyna Ferris MD; Jan 24 2017 11:55AM EST                       (Author)

## 2018-01-24 VITALS
HEIGHT: 62 IN | WEIGHT: 141.13 LBS | SYSTOLIC BLOOD PRESSURE: 178 MMHG | DIASTOLIC BLOOD PRESSURE: 72 MMHG | HEART RATE: 62 BPM | BODY MASS INDEX: 25.97 KG/M2 | OXYGEN SATURATION: 98 % | TEMPERATURE: 97.5 F

## 2018-02-02 ENCOUNTER — CLINICAL SUPPORT (OUTPATIENT)
Dept: INTERNAL MEDICINE CLINIC | Facility: CLINIC | Age: 83
End: 2018-02-02
Payer: COMMERCIAL

## 2018-02-02 DIAGNOSIS — C16.9 MALIGNANT NEOPLASM OF STOMACH, UNSPECIFIED LOCATION (HCC): Primary | ICD-10-CM

## 2018-02-02 LAB
ALBUMIN SERPL BCP-MCNC: 4 G/DL (ref 3.5–5)
ALP SERPL-CCNC: 92 U/L (ref 46–116)
ALT SERPL W P-5'-P-CCNC: 24 U/L (ref 12–78)
ANION GAP SERPL CALCULATED.3IONS-SCNC: 7 MMOL/L (ref 4–13)
AST SERPL W P-5'-P-CCNC: 29 U/L (ref 5–45)
BASOPHILS # BLD AUTO: 0.09 THOUSANDS/ΜL (ref 0–0.1)
BASOPHILS NFR BLD AUTO: 2 % (ref 0–1)
BILIRUB SERPL-MCNC: 0.31 MG/DL (ref 0.2–1)
BUN SERPL-MCNC: 24 MG/DL (ref 5–25)
CALCIUM SERPL-MCNC: 9.1 MG/DL (ref 8.3–10.1)
CEA SERPL-MCNC: 0.5 NG/ML (ref 0–3)
CHLORIDE SERPL-SCNC: 94 MMOL/L (ref 100–108)
CO2 SERPL-SCNC: 30 MMOL/L (ref 21–32)
CREAT SERPL-MCNC: 0.96 MG/DL (ref 0.6–1.3)
EOSINOPHIL # BLD AUTO: 0.38 THOUSAND/ΜL (ref 0–0.61)
EOSINOPHIL NFR BLD AUTO: 8 % (ref 0–6)
ERYTHROCYTE [DISTWIDTH] IN BLOOD BY AUTOMATED COUNT: 12.8 % (ref 11.6–15.1)
GFR SERPL CREATININE-BSD FRML MDRD: 53 ML/MIN/1.73SQ M
GLUCOSE SERPL-MCNC: 95 MG/DL (ref 65–140)
HCT VFR BLD AUTO: 36.4 % (ref 34.8–46.1)
HGB BLD-MCNC: 12.1 G/DL (ref 11.5–15.4)
LYMPHOCYTES # BLD AUTO: 0.97 THOUSANDS/ΜL (ref 0.6–4.47)
LYMPHOCYTES NFR BLD AUTO: 19 % (ref 14–44)
MCH RBC QN AUTO: 29.8 PG (ref 26.8–34.3)
MCHC RBC AUTO-ENTMCNC: 33.2 G/DL (ref 31.4–37.4)
MCV RBC AUTO: 90 FL (ref 82–98)
MONOCYTES # BLD AUTO: 0.54 THOUSAND/ΜL (ref 0.17–1.22)
MONOCYTES NFR BLD AUTO: 11 % (ref 4–12)
NEUTROPHILS # BLD AUTO: 3.05 THOUSANDS/ΜL (ref 1.85–7.62)
NEUTS SEG NFR BLD AUTO: 60 % (ref 43–75)
NRBC BLD AUTO-RTO: 0 /100 WBCS
PLATELET # BLD AUTO: 235 THOUSANDS/UL (ref 149–390)
PMV BLD AUTO: 9.8 FL (ref 8.9–12.7)
POTASSIUM SERPL-SCNC: 4.7 MMOL/L (ref 3.5–5.3)
PROT SERPL-MCNC: 7.6 G/DL (ref 6.4–8.2)
RBC # BLD AUTO: 4.06 MILLION/UL (ref 3.81–5.12)
SODIUM SERPL-SCNC: 131 MMOL/L (ref 136–145)
WBC # BLD AUTO: 5.04 THOUSAND/UL (ref 4.31–10.16)

## 2018-02-02 PROCEDURE — 36415 COLL VENOUS BLD VENIPUNCTURE: CPT

## 2018-02-02 PROCEDURE — 85025 COMPLETE CBC W/AUTO DIFF WBC: CPT | Performed by: SURGERY

## 2018-02-02 PROCEDURE — 86301 IMMUNOASSAY TUMOR CA 19-9: CPT | Performed by: SURGERY

## 2018-02-02 PROCEDURE — 99999 PR OFFICE/OUTPT VISIT,PROCEDURE ONLY: CPT

## 2018-02-02 PROCEDURE — 80053 COMPREHEN METABOLIC PANEL: CPT | Performed by: SURGERY

## 2018-02-02 PROCEDURE — 82378 CARCINOEMBRYONIC ANTIGEN: CPT | Performed by: SURGERY

## 2018-02-03 LAB — CANCER AG19-9 SERPL-ACNC: 18 U/ML (ref 0–35)

## 2018-03-07 NOTE — PROGRESS NOTES
History of Present Illness    Revaccination   Vaccine Information: Vaccine(s) Given (names): Prevnar 13  Spoke with patient regarding vaccine out of temperature range  Action(s): Pt will be revaccinated  Appointment scheduled: [phone] [id number]   Pt contacted and will call back  Other Information: [phone] kz - Patient is agreeable to receive RVAC  She does not drive and she will have her daughter Vianney Novak call the office for this information and to schedule the [facility] appointment  Madelyn Ch  12/15/2016 [place] Daughter Vianney Novak called requesting mom be revaccinated today as they are on the way to the office  appointment scheduled  kz    Revaccination Completed: [id number]  Active Problems    1  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)   2  Anemia due to blood loss, chronic (280 0) (D50 0)   3  Benign essential hypertension (401 1) (I10)   4  Cancer of antrum of stomach (151 2) (C16 3)   5  Degenerative arthritis of right knee (715 96) (M17 9)   6  History of stomach cancer (V10 04) (Z85 028)   7  Hyperlipidemia (272 4) (E78 5)   8  Hyponatremia (276 1) (E87 1)   9  Hypothyroidism (244 9) (E03 9)   10  Need for prophylactic vaccination and inoculation against influenza (V04 81) (Z23)   11  Need for revaccination (V05 9) (Z23)   12  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)   13  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 3)   14  Vasculitis (447 6) (I77 6)    Immunizations  Influenza --- Marie Gerard: 18-Nov-2005; Yordy Pizano: 68-Wpu-2042Pibeurzj Phalen: 53-Ekx-1538Staosid John:  24-Oct-2008; Series5: 15-Oct-2010; Series6: 87-Dmp-1646Vvnipyw Gentleman: 20-Vfl-0681Nmdr Cherry:  63-Cpd-6817Ndsirs Show: 14-Oct-2014; Armond Cheng: 15-ZOM-9698; GLDZIC23: 12-Dec-2016   PCV --- Series1: 21-Jul-2015   PPSV --- Series1: 22-Apr-2005   Td/DT --- Series1: 16-Apr-2008     Current Meds   1  AmLODIPine Besylate 5 MG Oral Tablet; TAKE 1 TABLET DAILY   2  Bystolic 10 MG Oral Tablet; Take 1 tablet daily   3  Centrum Silver Oral Tablet; TAKE 1 TABLET DAILY   4  Ciloxan 0 3 % Ophthalmic Solution; take as directed   5  CloNIDine HCl - 0 1 MG Oral Tablet; TAKE 1 TABLET TWICE DAILY   6  Ferrous Sulfate 325 (65 Fe) MG Oral Tablet; TAKE 1 TABLET DAILY AS DIRECTED   7  Furosemide 40 MG Oral Tablet; TAKE 1 TABLET DAILY   8  Levothyroxine Sodium 25 MCG Oral Tablet; TAKE 1 TABLET DAILY   9  Nasacort Allergy 24HR 55 MCG/ACT Nasal Aerosol; To be used as directed   10  Omeprazole 40 MG Oral Capsule Delayed Release; TAKE 1 CAPSULE DAILY   11  PrednisoLONE Acetate 1 % Ophthalmic Suspension; take as directed   12  Systane SOLN; use as directed   13  Tylenol Extra Strength 500 MG Oral Tablet; Take 1 tablet twice daily   14  Vitamin B-6 TABS; TAKE 2 times daily   15  Vitamin D-3 1000 UNIT Oral Capsule; Take 1 tablet daily   16  Zirgan 0 15 % Ophthalmic Gel; take as directed    Allergies    1  No Known Drug Allergies    2  No Known Food Allergies   3  Seasonal    Future Appointments    Date/Time Provider Specialty Site   12/28/2016 11:00 AM [facility] Internal Medicine [facility]     Signatures   Electronically signed by :  Mariela Alejandro MD; Jan 12 2017  8:41PM EST                       (Author)

## 2018-03-23 DIAGNOSIS — E03.9 HYPOTHYROIDISM, UNSPECIFIED TYPE: Primary | ICD-10-CM

## 2018-03-23 RX ORDER — LEVOTHYROXINE SODIUM 0.03 MG/1
1 TABLET ORAL DAILY
COMMUNITY
End: 2018-03-23 | Stop reason: SDUPTHER

## 2018-03-23 RX ORDER — LEVOTHYROXINE SODIUM 0.03 MG/1
25 TABLET ORAL DAILY
Qty: 30 TABLET | Refills: 2 | Status: SHIPPED | OUTPATIENT
Start: 2018-03-23 | End: 2018-08-27 | Stop reason: SDUPTHER

## 2018-03-26 ENCOUNTER — CLINICAL SUPPORT (OUTPATIENT)
Dept: INTERNAL MEDICINE CLINIC | Facility: CLINIC | Age: 83
End: 2018-03-26
Payer: COMMERCIAL

## 2018-03-26 DIAGNOSIS — E87.1 HYPOSMOLALITY SYNDROME: ICD-10-CM

## 2018-03-26 DIAGNOSIS — I10 ESSENTIAL HYPERTENSION, BENIGN: Primary | ICD-10-CM

## 2018-03-26 DIAGNOSIS — R73.9 HYPERGLYCEMIA: ICD-10-CM

## 2018-03-26 DIAGNOSIS — M17.10 PRIMARY LOCALIZED OSTEOARTHROSIS OF LOWER LEG, UNSPECIFIED LATERALITY: ICD-10-CM

## 2018-03-26 LAB
ANION GAP SERPL CALCULATED.3IONS-SCNC: 4 MMOL/L (ref 4–13)
BUN SERPL-MCNC: 14 MG/DL (ref 5–25)
CALCIUM SERPL-MCNC: 9.3 MG/DL (ref 8.3–10.1)
CHLORIDE SERPL-SCNC: 98 MMOL/L (ref 100–108)
CO2 SERPL-SCNC: 31 MMOL/L (ref 21–32)
CREAT SERPL-MCNC: 0.66 MG/DL (ref 0.6–1.3)
ERYTHROCYTE [DISTWIDTH] IN BLOOD BY AUTOMATED COUNT: 13.8 % (ref 11.6–15.1)
GFR SERPL CREATININE-BSD FRML MDRD: 80 ML/MIN/1.73SQ M
GLUCOSE P FAST SERPL-MCNC: 91 MG/DL (ref 65–99)
HCT VFR BLD AUTO: 34.6 % (ref 34.8–46.1)
HGB BLD-MCNC: 11.3 G/DL (ref 11.5–15.4)
MCH RBC QN AUTO: 29 PG (ref 26.8–34.3)
MCHC RBC AUTO-ENTMCNC: 32.7 G/DL (ref 31.4–37.4)
MCV RBC AUTO: 89 FL (ref 82–98)
PLATELET # BLD AUTO: 229 THOUSANDS/UL (ref 149–390)
PMV BLD AUTO: 9.4 FL (ref 8.9–12.7)
POTASSIUM SERPL-SCNC: 3.9 MMOL/L (ref 3.5–5.3)
RBC # BLD AUTO: 3.89 MILLION/UL (ref 3.81–5.12)
SODIUM SERPL-SCNC: 133 MMOL/L (ref 136–145)
TSH SERPL DL<=0.05 MIU/L-ACNC: 2.77 UIU/ML (ref 0.36–3.74)
WBC # BLD AUTO: 5.24 THOUSAND/UL (ref 4.31–10.16)

## 2018-03-26 PROCEDURE — 80048 BASIC METABOLIC PNL TOTAL CA: CPT | Performed by: INTERNAL MEDICINE

## 2018-03-26 PROCEDURE — 85027 COMPLETE CBC AUTOMATED: CPT | Performed by: INTERNAL MEDICINE

## 2018-03-26 PROCEDURE — 84443 ASSAY THYROID STIM HORMONE: CPT | Performed by: INTERNAL MEDICINE

## 2018-03-26 PROCEDURE — 36415 COLL VENOUS BLD VENIPUNCTURE: CPT

## 2018-03-28 ENCOUNTER — OFFICE VISIT (OUTPATIENT)
Dept: INTERNAL MEDICINE CLINIC | Facility: CLINIC | Age: 83
End: 2018-03-28
Payer: COMMERCIAL

## 2018-03-28 VITALS
BODY MASS INDEX: 26.35 KG/M2 | OXYGEN SATURATION: 97 % | WEIGHT: 143.2 LBS | HEIGHT: 62 IN | HEART RATE: 58 BPM | TEMPERATURE: 98 F | DIASTOLIC BLOOD PRESSURE: 78 MMHG | SYSTOLIC BLOOD PRESSURE: 168 MMHG

## 2018-03-28 DIAGNOSIS — I10 ESSENTIAL HYPERTENSION: ICD-10-CM

## 2018-03-28 DIAGNOSIS — E03.9 HYPOTHYROIDISM, UNSPECIFIED TYPE: ICD-10-CM

## 2018-03-28 DIAGNOSIS — K21.9 GASTROESOPHAGEAL REFLUX DISEASE WITHOUT ESOPHAGITIS: Primary | ICD-10-CM

## 2018-03-28 DIAGNOSIS — D50.9 IRON DEFICIENCY ANEMIA, UNSPECIFIED IRON DEFICIENCY ANEMIA TYPE: ICD-10-CM

## 2018-03-28 PROBLEM — D64.9 ANEMIA: Status: ACTIVE | Noted: 2017-03-15

## 2018-03-28 PROCEDURE — 99214 OFFICE O/P EST MOD 30 MIN: CPT | Performed by: INTERNAL MEDICINE

## 2018-03-28 RX ORDER — LOSARTAN POTASSIUM 100 MG/1
1 TABLET ORAL DAILY
COMMUNITY
Start: 2017-05-11 | End: 2018-07-12 | Stop reason: SDUPTHER

## 2018-03-28 RX ORDER — CIPROFLOXACIN HYDROCHLORIDE 3.5 MG/ML
1 SOLUTION/ DROPS TOPICAL EVERY OTHER DAY
COMMUNITY
Start: 2015-02-11

## 2018-03-28 RX ORDER — MULTIVITAMIN WITH IRON
100 TABLET ORAL DAILY
COMMUNITY

## 2018-03-28 RX ORDER — NEBIVOLOL 10 MG/1
1 TABLET ORAL DAILY
COMMUNITY
End: 2018-08-09 | Stop reason: SDUPTHER

## 2018-03-28 RX ORDER — OMEPRAZOLE 40 MG/1
1 CAPSULE, DELAYED RELEASE ORAL DAILY
COMMUNITY
Start: 2014-03-15 | End: 2018-10-25 | Stop reason: SDUPTHER

## 2018-03-28 RX ORDER — FUROSEMIDE 40 MG/1
1 TABLET ORAL DAILY
COMMUNITY
Start: 2017-07-27 | End: 2018-10-26 | Stop reason: SDUPTHER

## 2018-03-28 RX ORDER — PREDNISOLONE ACETATE 10 MG/ML
1 SUSPENSION/ DROPS OPHTHALMIC EVERY OTHER DAY
COMMUNITY
Start: 2015-02-11

## 2018-03-28 RX ORDER — ACETAMINOPHEN 500 MG
1 TABLET ORAL AS NEEDED
COMMUNITY

## 2018-03-28 RX ORDER — AMMONIUM LACTATE 12 G/100G
LOTION TOPICAL AS NEEDED
COMMUNITY
Start: 2017-07-27 | End: 2019-06-03 | Stop reason: ALTCHOICE

## 2018-03-28 RX ORDER — BIOTIN 1 MG
1 TABLET ORAL DAILY
COMMUNITY
Start: 2015-02-11

## 2018-03-28 NOTE — PROGRESS NOTES
Assessment/Plan:    1  Essential hypertension   repeat blood pressure is 148/70  We will continue with present regimen  Will check renal function before next visit     2  Anemia of chronic disease   there is slight drop of her hemoglobin as compared to the previous on  Advised to take ferrous sulfate 325 mg b i d     Will check CBC before next visit     3  Hypothyroidism     TSH is within normal limit  Will continue with levothyroxine 25 mcg daily       Diagnoses and all orders for this visit:    Gastroesophageal reflux disease without esophagitis    Hypothyroidism, unspecified type    Essential hypertension  -     Basic metabolic panel; Future    Iron deficiency anemia, unspecified iron deficiency anemia type  -     CBC and differential; Future    Other orders  -     acetaminophen (TYLENOL) 500 mg tablet; Take 1 tablet by mouth 4 (four) times a day  -     nebivolol (BYSTOLIC) 10 mg tablet; Take 1 tablet by mouth daily  -     ammonium lactate (AMLACTIN) 12 % lotion; Apply topically  -     Multiple Vitamins-Minerals (CENTRUM SILVER 50+WOMEN PO); Take 1 tablet by mouth daily  -     ciprofloxacin (CILOXAN) 0 3 % ophthalmic solution; Apply to eye  -     Ganciclovir (ZIRGAN) 0 15 % GEL; Apply to eye  -     Cholecalciferol (VITAMIN D3) 1000 units CAPS; Take 1 tablet by mouth daily  -     pyridoxine (VITAMIN B6) 100 mg tablet; Take by mouth  -     polyethylene glycol-propylene glycol (SYSTANE) 0 4-0 3 %; Apply to eye  -     prednisoLONE acetate (PRED FORTE) 1 % ophthalmic suspension; Apply to eye  -     omeprazole (PriLOSEC) 40 MG capsule; Take 1 capsule by mouth daily  -     losartan (COZAAR) 100 MG tablet; Take 1 tablet by mouth daily  -     furosemide (LASIX) 40 mg tablet; Take 1 tablet by mouth daily          Subjective:          Patient ID: Lissette Talbert is a 80 y o  female  Hypertension   This is a chronic problem  The current episode started more than 1 year ago  The problem is controlled   Pertinent negatives include no chest pain, headaches, neck pain, palpitations or shortness of breath  There are no associated agents to hypertension  Risk factors for coronary artery disease include dyslipidemia  Past treatments include beta blockers and calcium channel blockers  The current treatment provides moderate improvement  The following portions of the patient's history were reviewed and updated as appropriate: allergies, current medications, past family history, past medical history, past social history, past surgical history and problem list     Review of Systems   Constitutional: Negative for fatigue and fever  HENT: Negative for congestion, ear discharge, ear pain, postnasal drip, sinus pressure, sore throat, tinnitus and trouble swallowing  Eyes: Negative for discharge, itching and visual disturbance  Respiratory: Negative for cough and shortness of breath  Cardiovascular: Negative for chest pain and palpitations  Gastrointestinal: Negative for abdominal pain, diarrhea, nausea and vomiting  Endocrine: Negative for cold intolerance and polyuria  Genitourinary: Negative for difficulty urinating, dysuria and urgency  Musculoskeletal: Negative for arthralgias and neck pain  Skin: Negative for rash  Allergic/Immunologic: Negative for environmental allergies  Neurological: Negative for dizziness, weakness and headaches  Psychiatric/Behavioral: Negative for agitation and behavioral problems  The patient is not nervous/anxious  History reviewed  No pertinent past medical history        Current Outpatient Prescriptions:     acetaminophen (TYLENOL) 500 mg tablet, Take 1 tablet by mouth 4 (four) times a day, Disp: , Rfl:     ammonium lactate (AMLACTIN) 12 % lotion, Apply topically, Disp: , Rfl:     Cholecalciferol (VITAMIN D3) 1000 units CAPS, Take 1 tablet by mouth daily, Disp: , Rfl:     ciprofloxacin (CILOXAN) 0 3 % ophthalmic solution, Apply to eye, Disp: , Rfl:    furosemide (LASIX) 40 mg tablet, Take 1 tablet by mouth daily, Disp: , Rfl:     Ganciclovir (ZIRGAN) 0 15 % GEL, Apply to eye, Disp: , Rfl:     levothyroxine 25 mcg tablet, Take 1 tablet (25 mcg total) by mouth daily, Disp: 30 tablet, Rfl: 2    losartan (COZAAR) 100 MG tablet, Take 1 tablet by mouth daily, Disp: , Rfl:     Multiple Vitamins-Minerals (CENTRUM SILVER 50+WOMEN PO), Take 1 tablet by mouth daily, Disp: , Rfl:     nebivolol (BYSTOLIC) 10 mg tablet, Take 1 tablet by mouth daily, Disp: , Rfl:     omeprazole (PriLOSEC) 40 MG capsule, Take 1 capsule by mouth daily, Disp: , Rfl:     polyethylene glycol-propylene glycol (SYSTANE) 0 4-0 3 %, Apply to eye, Disp: , Rfl:     prednisoLONE acetate (PRED FORTE) 1 % ophthalmic suspension, Apply to eye, Disp: , Rfl:     pyridoxine (VITAMIN B6) 100 mg tablet, Take by mouth, Disp: , Rfl:     Allergies   Allergen Reactions    Oxycodone Hallucinations and Nausea Only    Oxycodone-Acetaminophen Hallucinations and Nausea Only    Pollen Extract     Latex Rash       Social History   History reviewed  No pertinent surgical history  History reviewed  No pertinent family history  Objective:  /78 (BP Location: Left arm, Patient Position: Sitting, Cuff Size: Adult)   Pulse 58   Temp 98 °F (36 7 °C) (Oral)   Ht 5' 1 5" (1 562 m)   Wt 65 kg (143 lb 3 2 oz)   SpO2 97%   BMI 26 62 kg/m²   Body mass index is 26 62 kg/m²  Physical Exam   Constitutional: She appears well-developed  HENT:   Head: Normocephalic  Right Ear: External ear normal    Left Ear: External ear normal    Mouth/Throat: Oropharynx is clear and moist    Eyes: Pupils are equal, round, and reactive to light  No scleral icterus  Neck: Normal range of motion  Neck supple  No tracheal deviation present  No thyromegaly present  Cardiovascular: Normal rate, regular rhythm and normal heart sounds  Pulmonary/Chest: Effort normal and breath sounds normal  No respiratory distress   She exhibits no tenderness  Abdominal: Soft  Bowel sounds are normal  She exhibits no mass  There is no tenderness  Musculoskeletal: Normal range of motion  She exhibits edema  Trace pedal edema noted  Lymphadenopathy:     She has no cervical adenopathy  Neurological: She is alert  No cranial nerve deficit  Skin: Skin is warm  Psychiatric: She has a normal mood and affect   Her behavior is normal

## 2018-04-09 ENCOUNTER — OFFICE VISIT (OUTPATIENT)
Dept: INTERNAL MEDICINE CLINIC | Facility: CLINIC | Age: 83
End: 2018-04-09
Payer: COMMERCIAL

## 2018-04-09 ENCOUNTER — TRANSCRIBE ORDERS (OUTPATIENT)
Dept: INTERNAL MEDICINE CLINIC | Facility: CLINIC | Age: 83
End: 2018-04-09

## 2018-04-09 VITALS
HEIGHT: 62 IN | WEIGHT: 140.8 LBS | HEART RATE: 58 BPM | DIASTOLIC BLOOD PRESSURE: 62 MMHG | OXYGEN SATURATION: 97 % | TEMPERATURE: 98.5 F | SYSTOLIC BLOOD PRESSURE: 160 MMHG | BODY MASS INDEX: 25.91 KG/M2

## 2018-04-09 DIAGNOSIS — R82.90 ABNORMAL URINE FINDING: ICD-10-CM

## 2018-04-09 DIAGNOSIS — J21.9 BRONCHIOLITIS: ICD-10-CM

## 2018-04-09 DIAGNOSIS — N64.1 NECROSIS, FAT, BREAST: Primary | ICD-10-CM

## 2018-04-09 DIAGNOSIS — J40 BRONCHITIS: Primary | ICD-10-CM

## 2018-04-09 LAB
SL AMB  POCT GLUCOSE, UA: NEGATIVE
SL AMB LEUKOCYTE ESTERASE,UA: ABNORMAL
SL AMB POCT BILIRUBIN,UA: NEGATIVE
SL AMB POCT BLOOD,UA: ABNORMAL
SL AMB POCT CLARITY,UA: CLEAR
SL AMB POCT COLOR,UA: YELLOW
SL AMB POCT KETONES,UA: NEGATIVE
SL AMB POCT NITRITE,UA: NEGATIVE
SL AMB POCT PH,UA: 7
SL AMB POCT SPECIFIC GRAVITY,UA: 1.02
SL AMB POCT URINE PROTEIN: NEGATIVE
SL AMB POCT UROBILINOGEN: 0.2

## 2018-04-09 PROCEDURE — 94640 AIRWAY INHALATION TREATMENT: CPT | Performed by: INTERNAL MEDICINE

## 2018-04-09 PROCEDURE — 99214 OFFICE O/P EST MOD 30 MIN: CPT | Performed by: INTERNAL MEDICINE

## 2018-04-09 PROCEDURE — 81003 URINALYSIS AUTO W/O SCOPE: CPT | Performed by: INTERNAL MEDICINE

## 2018-04-09 RX ORDER — DEXTROMETHORPHAN POLISTIREX 30 MG/5ML
10 SUSPENSION ORAL 2 TIMES DAILY
COMMUNITY
End: 2018-04-13 | Stop reason: ALTCHOICE

## 2018-04-09 RX ORDER — ALBUTEROL SULFATE 2.5 MG/3ML
2.5 SOLUTION RESPIRATORY (INHALATION) ONCE
Status: COMPLETED | OUTPATIENT
Start: 2018-04-09 | End: 2018-04-09

## 2018-04-09 RX ORDER — AZITHROMYCIN 250 MG/1
TABLET, FILM COATED ORAL
Qty: 6 TABLET | Refills: 0 | Status: SHIPPED | OUTPATIENT
Start: 2018-04-09 | End: 2018-04-13

## 2018-04-09 RX ORDER — ALBUTEROL SULFATE 90 UG/1
2 AEROSOL, METERED RESPIRATORY (INHALATION) EVERY 4 HOURS PRN
Status: DISCONTINUED | OUTPATIENT
Start: 2018-04-09 | End: 2018-04-13

## 2018-04-09 RX ORDER — PREDNISONE 10 MG/1
TABLET ORAL
Qty: 30 TABLET | Refills: 0 | Status: SHIPPED | OUTPATIENT
Start: 2018-04-09 | End: 2018-07-12 | Stop reason: ALTCHOICE

## 2018-04-09 RX ADMIN — ALBUTEROL SULFATE 2.5 MG: 2.5 SOLUTION RESPIRATORY (INHALATION) at 16:08

## 2018-04-09 NOTE — ASSESSMENT & PLAN NOTE
Albuterol neb given in office  Tolerated well  Post peak flow best of 3: 200  Will start zpak, prednisone taper, and albuterol inhaler  Instructed on proper use with inhaler and spacer ordered as well  Increase fluids, rest  F/u on Friday    If any worsening of symptoms, SOB, chest pain, fever > 101 go to the ER

## 2018-04-09 NOTE — PROGRESS NOTES
Assessment/Plan:    Bronchitis  Albuterol neb given in office  Tolerated well  Post peak flow best of 3: 200  Will start zpak, prednisone taper, and albuterol inhaler  Instructed on proper use with inhaler and spacer ordered as well  Increase fluids, rest  F/u on Friday  If any worsening of symptoms, SOB, chest pain, fever > 101 go to the ER    Anemia  Questionable source of bleedin episodes of blood with wiping  Urine dip trace blood  Discussed possibility of vaginal bleeding vs colon  Discussed how aggressive to work up  Family wants to monitor  Return if bleeding persists  If massive bleeding then go to the ER  Diagnoses and all orders for this visit:    Bronchitis  -     albuterol (PROVENTIL HFA,VENTOLIN HFA) inhaler 2 puff; Inhale 2 puffs every 4 (four) hours as needed for wheezing   -     Spacer Device for Inhaler  -     predniSONE 10 mg tablet; 4 po x 3 days 3 po 3 days, 2 po x 3 days, 1 po x 3 days  -     azithromycin (ZITHROMAX) 250 mg tablet; 2 po day 1, one po daily days 2-5    Other orders  -     Dextromethorphan Polistirex ER (DELSYM) 30 MG/5ML SUER; Take 10 mL by mouth 2 (two) times a day          Subjective:          Patient ID: John Weaver is a 80 y o  female  She also noticed a little blood when she wiped yesterday  She is unsure whether it is urinary or stool  She has mild TATIANA with recent hgb 11 3 and iron was increased to BID  She denies constipation or straining with Bm  She has notice 2 occasions but not the last time she went  Denies dysuria, frequency, urgency, nocturia  No prior hysterectomy  Her last colonoscopy was 2 years ago  Follows with Dr Rogerio Newsome  Cough   This is a new problem  The current episode started in the past 7 days  The problem has been unchanged  The problem occurs every few minutes  The cough is non-productive  Associated symptoms include chills, a fever, nasal congestion, postnasal drip, rhinorrhea and wheezing   Pertinent negatives include no chest pain, ear congestion, ear pain, headaches, myalgias, sore throat, shortness of breath or weight loss  The symptoms are aggravated by cold air  She has tried OTC cough suppressant for the symptoms  The treatment provided mild relief  Fever   Associated symptoms include abdominal pain, chills, coughing and a fever  Pertinent negatives include no chest pain, headaches, myalgias, nausea or sore throat  The following portions of the patient's history were reviewed and updated as appropriate: past family history, past social history, past surgical history and problem list     Review of Systems   Constitutional: Positive for chills and fever  Negative for weight loss  HENT: Positive for postnasal drip and rhinorrhea  Negative for ear pain and sore throat  Respiratory: Positive for cough and wheezing  Negative for shortness of breath  Cardiovascular: Negative for chest pain  Gastrointestinal: Positive for abdominal pain  Negative for constipation, diarrhea and nausea  Genitourinary: Negative for dysuria and urgency  Musculoskeletal: Negative for myalgias  Neurological: Positive for dizziness  Negative for headaches  "I get dizzy when I get upset  She recently left her purse at [facility]  No falls or feeling off balance  Psychiatric/Behavioral: Negative for dysphoric mood  The patient is not nervous/anxious  History reviewed  No pertinent past medical history        Current Outpatient Prescriptions:     acetaminophen (TYLENOL) 500 mg tablet, Take 1 tablet by mouth daily  , Disp: , Rfl:     ammonium lactate (AMLACTIN) 12 % lotion, Apply topically, Disp: , Rfl:     Cholecalciferol (VITAMIN D3) 1000 units CAPS, Take 1 tablet by mouth daily, Disp: , Rfl:     ciprofloxacin (CILOXAN) 0 3 % ophthalmic solution, Apply to eye, Disp: , Rfl:     Dextromethorphan Polistirex ER (DELSYM) 30 MG/5ML SUER, Take 10 mL by mouth 2 (two) times a day, Disp: , Rfl:    furosemide (LASIX) 40 mg tablet, Take 1 tablet by mouth daily, Disp: , Rfl:     Ganciclovir (ZIRGAN) 0 15 % GEL, Apply to eye, Disp: , Rfl:     levothyroxine 25 mcg tablet, Take 1 tablet (25 mcg total) by mouth daily, Disp: 30 tablet, Rfl: 2    losartan (COZAAR) 100 MG tablet, Take 1 tablet by mouth daily, Disp: , Rfl:     Multiple Vitamins-Minerals (CENTRUM SILVER 50+WOMEN PO), Take 1 tablet by mouth daily, Disp: , Rfl:     nebivolol (BYSTOLIC) 10 mg tablet, Take 1 tablet by mouth daily, Disp: , Rfl:     omeprazole (PriLOSEC) 40 MG capsule, Take 1 capsule by mouth daily, Disp: , Rfl:     polyethylene glycol-propylene glycol (SYSTANE) 0 4-0 3 %, Apply to eye, Disp: , Rfl:     prednisoLONE acetate (PRED FORTE) 1 % ophthalmic suspension, Apply to eye, Disp: , Rfl:     pyridoxine (VITAMIN B6) 100 mg tablet, Take by mouth, Disp: , Rfl:     azithromycin (ZITHROMAX) 250 mg tablet, 2 po day 1, one po daily days 2-5, Disp: 6 tablet, Rfl: 0    predniSONE 10 mg tablet, 4 po x 3 days 3 po 3 days, 2 po x 3 days, 1 po x 3 days, Disp: 30 tablet, Rfl: 0    Current Facility-Administered Medications:     albuterol (PROVENTIL HFA,VENTOLIN HFA) inhaler 2 puff, 2 puff, Inhalation, Q4H PRN, Juan Francisco Casper PA-C    Allergies   Allergen Reactions    Oxycodone Hallucinations and Nausea Only    Oxycodone-Acetaminophen Hallucinations and Nausea Only    Pollen Extract     Latex Rash       Social History   History reviewed  No pertinent surgical history  History reviewed  No pertinent family history      Health Maintenance   Topic Date Due    GLAUCOMA SCREENING 67+ YR  10/09/1997    DTaP,Tdap,and Td Vaccines (1 - Tdap) 04/17/2008    Fall Risk  02/27/2018    Urinary Incontinence Screening  02/27/2018    Depression Screening PHQ-9  05/11/2018    INFLUENZA VACCINE  Completed    PNEUMOCOCCAL POLYSACCHARIDE VACCINE AGE 72 AND OVER  Completed     Immunization History   Administered Date(s) Administered    Influenza 11/18/2005, 10/20/2006, 11/02/2007, 10/24/2008, 10/15/2010    Influenza Split High Dose Preservative Free IM 11/06/2013, 10/14/2014, 11/18/2015, 12/12/2016, 12/19/2017    Influenza TIV (IM) 12/09/2011, 11/16/2012    Pneumococcal Conjugate 13-Valent 07/21/2015, 12/15/2016    Pneumococcal Polysaccharide PPV23 04/22/2005    Td (adult), adsorbed 04/16/2008         Objective:  /62 (BP Location: Left arm, Patient Position: Sitting, Cuff Size: Adult)   Pulse 58   Temp 98 5 °F (36 9 °C) (Oral)   Ht 5' 1 5" (1 562 m)   Wt 63 9 kg (140 lb 12 8 oz)   SpO2 97%   BMI 26 17 kg/m²   Body mass index is 26 17 kg/m²  Physical Exam   Constitutional: She appears well-developed and well-nourished  No distress  Conversational very pleasant, NAD   HENT:   Head: Normocephalic and atraumatic  Right Ear: Tympanic membrane, external ear and ear canal normal    Left Ear: Tympanic membrane, external ear and ear canal normal    Nose: Nose normal    Mouth/Throat: Oropharynx is clear and moist    Eyes: Conjunctivae are normal  Pupils are equal, round, and reactive to light  Mild swelling around both eyes  Pt and daughter state this is chronic for her   Neck: Normal range of motion  Neck supple  Cardiovascular: Normal rate, regular rhythm and normal heart sounds  Pulmonary/Chest: Effort normal  No respiratory distress  She has wheezes  Diffuse expiratory wheezing throughout  Albuterol neb given  Pt tolerated very well  Improvement in breath sounds although some expiratory wheezing persists  Neurological: She is alert  Skin: Skin is warm and dry  She is not diaphoretic  Psychiatric: She has a normal mood and affect

## 2018-04-09 NOTE — PATIENT INSTRUCTIONS
Bronchitis  Albuterol neb given in office  Tolerated well  Post peak flow best of 3: 200  Will start zpak, prednisone taper, and albuterol inhaler  Instructed on proper use with inhaler and spacer ordered as well  Increase fluids, rest  F/u on Friday  If any worsening of symptoms, SOB, chest pain, fever > 101 go to the ER    Anemia  Questionable source of bleedin episodes of blood with wiping  Urine dip trace blood  Discussed possibility of vaginal bleeding vs colon  Discussed how aggressive to work up  Family wants to monitor  Return if bleeding persists  If massive bleeding then go to the ER

## 2018-04-09 NOTE — ASSESSMENT & PLAN NOTE
Questionable source of bleedin episodes of blood with wiping  Urine dip trace blood  Discussed possibility of vaginal bleeding vs colon  Discussed how aggressive to work up  Family wants to monitor  Return if bleeding persists  If massive bleeding then go to the ER

## 2018-04-13 ENCOUNTER — OFFICE VISIT (OUTPATIENT)
Dept: INTERNAL MEDICINE CLINIC | Facility: CLINIC | Age: 83
End: 2018-04-13
Payer: COMMERCIAL

## 2018-04-13 VITALS
HEART RATE: 64 BPM | HEIGHT: 62 IN | DIASTOLIC BLOOD PRESSURE: 84 MMHG | TEMPERATURE: 97.6 F | WEIGHT: 140.4 LBS | BODY MASS INDEX: 25.83 KG/M2 | OXYGEN SATURATION: 98 % | SYSTOLIC BLOOD PRESSURE: 170 MMHG

## 2018-04-13 DIAGNOSIS — I10 ESSENTIAL HYPERTENSION: ICD-10-CM

## 2018-04-13 DIAGNOSIS — M25.511 CHRONIC RIGHT SHOULDER PAIN: ICD-10-CM

## 2018-04-13 DIAGNOSIS — J40 BRONCHITIS: Primary | ICD-10-CM

## 2018-04-13 DIAGNOSIS — G89.29 CHRONIC RIGHT SHOULDER PAIN: ICD-10-CM

## 2018-04-13 DIAGNOSIS — M19.042 OSTEOARTHRITIS OF BOTH HANDS, UNSPECIFIED OSTEOARTHRITIS TYPE: ICD-10-CM

## 2018-04-13 DIAGNOSIS — M19.041 OSTEOARTHRITIS OF BOTH HANDS, UNSPECIFIED OSTEOARTHRITIS TYPE: ICD-10-CM

## 2018-04-13 PROBLEM — M19.049 OSTEOARTHRITIS, HAND: Status: ACTIVE | Noted: 2018-04-13

## 2018-04-13 PROCEDURE — 99213 OFFICE O/P EST LOW 20 MIN: CPT | Performed by: PHYSICIAN ASSISTANT

## 2018-04-13 RX ORDER — ALBUTEROL SULFATE 90 UG/1
2 AEROSOL, METERED RESPIRATORY (INHALATION) EVERY 6 HOURS PRN
Qty: 1 INHALER | Refills: 0 | Status: SHIPPED | OUTPATIENT
Start: 2018-04-13 | End: 2019-09-25

## 2018-04-13 NOTE — ASSESSMENT & PLAN NOTE
Patient with chronic right shoulder pain and decreased range of motion  Previously had been receiving physical therapy  Will obtain x-ray and refer back to physical therapy  If symptoms persist may need orthopedic evaluation  Discussed high likelihood of arthritis changes to the shoulder  May need steroid shot

## 2018-04-13 NOTE — ASSESSMENT & PLAN NOTE
Cough symptoms much improved with the addition of prednisone taper and azithromycin  Patient notes she was unable to  albuterol script as it was not at the pharmacy  Not currently wheezing at this time however script given for albuterol HFA as well as inhaler spacer that can be used if wheezing develops  Complete entire course of antibiotics and prednisone  Take prednisone in the morning with food

## 2018-04-13 NOTE — ASSESSMENT & PLAN NOTE
Discussed may use topical creams to help with osteoarthritis of the hands  May use paraffin wax past which also provides relief  Will refer to physical therapy    Hold on imaging of the hand

## 2018-04-13 NOTE — PATIENT INSTRUCTIONS
Bronchitis  Cough symptoms much improved with the addition of prednisone taper and azithromycin  Patient notes she was unable to  albuterol script as it was not at the pharmacy  Not currently wheezing at this time however script given for albuterol HFA as well as inhaler spacer that can be used if wheezing develops  Complete entire course of antibiotics and prednisone  Take prednisone in the morning with food

## 2018-04-13 NOTE — PROGRESS NOTES
[facility] [address] [place] [address]  Standard Office Visit  Patient ID: Margaret Yeboah    : 10/9/1930  Age/Gender: 80 y o  female     DATE: 2018      Assessment/Plan:    Bronchitis  Cough symptoms much improved with the addition of prednisone taper and azithromycin  Patient notes she was unable to  albuterol script as it was not at the pharmacy  Not currently wheezing at this time however script given for albuterol HFA as well as inhaler spacer that can be used if wheezing develops  Complete entire course of antibiotics and prednisone  Take prednisone in the morning with food  Osteoarthritis, hand   Discussed may use topical creams to help with osteoarthritis of the hands  May use paraffin wax past which also provides relief  Will refer to physical therapy  Hold on imaging of the hand    Chronic right shoulder pain   Patient with chronic right shoulder pain and decreased range of motion  Previously had been receiving physical therapy  Will obtain x-ray and refer back to physical therapy  If symptoms persist may need orthopedic evaluation  Discussed high likelihood of arthritis changes to the shoulder  May need steroid shot  Essential hypertension   Blood pressure elevated on today's visit  Has been elevated on previous visits    Call patient to see that she is checking blood pressure at home and that it is well controlled  Left message requesting her to return call to our office  Avoid any over-the-counter decongestants or stimulants which could affect blood pressure as well  Diagnoses and all orders for this visit:    Bronchitis  -     albuterol (PROVENTIL HFA,VENTOLIN HFA) 90 mcg/act inhaler; Inhale 2 puffs every 6 (six) hours as needed for wheezing  -     Spacer Device for Inhaler    Chronic right shoulder pain  -     XR shoulder 2+ vw right; Future  -     Ambulatory referral to Physical Therapy;  Future    Osteoarthritis of both hands, unspecified osteoarthritis type  -     Ambulatory referral to Physical Therapy; Future    Essential hypertension          Subjective:   Chief Complaint   Patient presents with    Follow-up     from Monday due to cough     Cough     Is better          Judith Griffin is a 80 y o  female who presents to the office on 4/13/2018 for     81 yo female for follow up  Since last visit notes cough is improved with medications  Coughing much less  Feeling "much better"  Notes she is able to eat  No fever  Bringing up very little mucus  Notes she has ongoing hoarseness since her endoscopy in the past several years ago  Note she has always had some right shoulder pain and decreased range of motion  Chronic unchanged for several years  Previously was receiving physical therapy and wants to know she could restart  Does walk with a cane  No recent fall or trauma to her arm  Cough   This is a recurrent problem  The problem has been gradually improving  The problem occurs every few hours  The cough is productive of sputum  Associated symptoms include nasal congestion and rhinorrhea  Pertinent negatives include no chest pain, chills, ear congestion, ear pain, fever, hemoptysis, postnasal drip, sore throat, shortness of breath, sweats or wheezing  Her past medical history is significant for bronchitis  Shoulder Pain    The pain is present in the right shoulder  This is a chronic problem  The current episode started more than 1 year ago  The problem occurs intermittently  The problem has been waxing and waning  The quality of the pain is described as aching  The pain is mild  Associated symptoms include joint locking, a limited range of motion and stiffness  Pertinent negatives include no fever, numbness or tingling         The following portions of the patient's history were reviewed and updated as appropriate: allergies, current medications, past family history, past medical history, past social history, past surgical history and problem list     Review of Systems   Constitutional: Negative for chills and fever  HENT: Positive for rhinorrhea  Negative for ear pain, postnasal drip and sore throat  Respiratory: Positive for cough  Negative for hemoptysis, shortness of breath and wheezing  Cardiovascular: Negative for chest pain and palpitations  Gastrointestinal: Negative for abdominal distention, diarrhea, nausea and vomiting  Musculoskeletal: Positive for stiffness  Neurological: Negative for tingling and numbness           Patient Active Problem List   Diagnosis    Anemia    Essential hypertension    Gastroesophageal reflux disease    Hypothyroidism    Bronchitis    Chronic right shoulder pain    Osteoarthritis, hand       Past Medical History:   Diagnosis Date    Allergic     Anemia     Arthritis     Cancer ([address] )     Disease of thyroid gland     GERD (gastroesophageal reflux disease)     Hypertension     Osteoporosis     Visual impairment        Past Surgical History:   Procedure Laterality Date    COLONOSCOPY      DENTAL SURGERY      EYE SURGERY      JOINT REPLACEMENT      KNEE SURGERY      LYMPH NODE BIOPSY      STOMACH SURGERY           Current Outpatient Prescriptions:     acetaminophen (TYLENOL) 500 mg tablet, Take 1 tablet by mouth daily  , Disp: , Rfl:     ammonium lactate (AMLACTIN) 12 % lotion, Apply topically, Disp: , Rfl:     azithromycin (ZITHROMAX) 250 mg tablet, 2 po day 1, one po daily days 2-5, Disp: 6 tablet, Rfl: 0    Cholecalciferol (VITAMIN D3) 1000 units CAPS, Take 1 tablet by mouth daily, Disp: , Rfl:     ciprofloxacin (CILOXAN) 0 3 % ophthalmic solution, Apply to eye, Disp: , Rfl:     furosemide (LASIX) 40 mg tablet, Take 1 tablet by mouth daily, Disp: , Rfl:     Ganciclovir (ZIRGAN) 0 15 % GEL, Apply to eye, Disp: , Rfl:     levothyroxine 25 mcg tablet, Take 1 tablet (25 mcg total) by mouth daily, Disp: 30 tablet, Rfl: 2    losartan (COZAAR) 100 MG tablet, Take 1 tablet by mouth daily, Disp: , Rfl:     Multiple Vitamins-Minerals (CENTRUM SILVER 50+WOMEN PO), Take 1 tablet by mouth daily, Disp: , Rfl:     nebivolol (BYSTOLIC) 10 mg tablet, Take 1 tablet by mouth daily, Disp: , Rfl:     omeprazole (PriLOSEC) 40 MG capsule, Take 1 capsule by mouth daily, Disp: , Rfl:     polyethylene glycol-propylene glycol (SYSTANE) 0 4-0 3 %, Apply to eye, Disp: , Rfl:     prednisoLONE acetate (PRED FORTE) 1 % ophthalmic suspension, Apply to eye, Disp: , Rfl:     predniSONE 10 mg tablet, 4 po x 3 days 3 po 3 days, 2 po x 3 days, 1 po x 3 days, Disp: 30 tablet, Rfl: 0    pyridoxine (VITAMIN B6) 100 mg tablet, Take by mouth, Disp: , Rfl:     albuterol (PROVENTIL HFA,VENTOLIN HFA) 90 mcg/act inhaler, Inhale 2 puffs every 6 (six) hours as needed for wheezing, Disp: 1 Inhaler, Rfl: 0  No current facility-administered medications for this visit  Allergies   Allergen Reactions    Oxycodone Hallucinations and Nausea Only    Oxycodone-Acetaminophen Hallucinations and Nausea Only    Pollen Extract     Latex Rash       Social History     Social History    Marital status:       Spouse name: N/A    Number of children: N/A    Years of education: N/A     Social History Main Topics    Smoking status: Never Smoker    Smokeless tobacco: Never Used    Alcohol use No    Drug use: No    Sexual activity: Not Asked     Other Topics Concern    None     Social History Narrative    None       Family History   Problem Relation Age of Onset    Heart attack Mother     Cancer Father     Prostate cancer Father        PHQ-9 Depression Screening    PHQ-9:    Frequency of the following problems over the past two weeks:              Health Maintenance   Topic Date Due    GLAUCOMA SCREENING 67+ YR  10/09/1997    DTaP,Tdap,and Td Vaccines (1 - Tdap) 04/17/2008    Fall Risk  02/27/2018    Urinary Incontinence Screening  02/27/2018    Depression Screening PHQ-9 05/11/2018    INFLUENZA VACCINE  Completed    PNEUMOCOCCAL POLYSACCHARIDE VACCINE AGE 65 AND OVER  Completed       Immunization History   Administered Date(s) Administered    Influenza 11/18/2005, 10/20/2006, 11/02/2007, 10/24/2008, 10/15/2010    Influenza Split High Dose Preservative Free IM 11/06/2013, 10/14/2014, 11/18/2015, 12/12/2016, 12/19/2017    Influenza TIV (IM) 12/09/2011, 11/16/2012    Pneumococcal Conjugate 13-Valent 07/21/2015, 12/15/2016    Pneumococcal Polysaccharide PPV23 04/22/2005    Td (adult), adsorbed 04/16/2008        Objective:  Vitals:    04/13/18 0938   BP: 170/84   BP Location: Left arm   Patient Position: Sitting   Cuff Size: Adult   Pulse: 64   Temp: 97 6 °F (36 4 °C)   TempSrc: Oral   SpO2: 98%   Weight: 63 7 kg (140 lb 6 4 oz)   Height: 5' 1 5" (1 562 m)     Wt Readings from Last 3 Encounters:   04/13/18 63 7 kg (140 lb 6 4 oz)   04/09/18 63 9 kg (140 lb 12 8 oz)   03/28/18 65 kg (143 lb 3 2 oz)     Body mass index is 26 1 kg/m²  No exam data present       Physical Exam   Constitutional: She is oriented to person, place, and time  She appears well-developed and well-nourished  No distress  Alert pleasant cooperative seated in room in no acute distress with patient's daughter  HENT:   Head: Normocephalic and atraumatic  Mouth/Throat: Oropharynx is clear and moist  No oropharyngeal exudate  Normal posterior pharynx no erythema or exudates   Eyes: Conjunctivae are normal  Right eye exhibits no discharge  Left eye exhibits no discharge  No scleral icterus  Neck: Neck supple  Cardiovascular: Normal rate, regular rhythm and normal heart sounds  No murmur heard  Pulmonary/Chest: Effort normal and breath sounds normal  No respiratory distress  She has no wheezes  Dry nonproductive cough  No crackles no rhonchi and no wheeze  Adequate air exchange throughout  No respiratory distress  PO2 stable on room air  Talking in complete sentences  Abdominal: Soft   There is no tenderness  Musculoskeletal: She exhibits no edema  Bony osteoarthritic changes to bilateral hands  Prominent MCP and IP joints  Right shoulder decreased range of motion with palpable crepitus with range of motion  No tenderness to palpation over the shoulder however  Severely reduced right shoulder range of motion  Normal internal rotation and external rotation  Decreased  Forward flexion and lateral flexion  45-60 degrees estimated  No tenderness to palpation along clavicle   Neurological: She is alert and oriented to person, place, and time  Skin: Skin is warm  She is not diaphoretic    + dry skin on cheeks of face  Psychiatric: She has a normal mood and affect  Her behavior is normal  Thought content normal    Nursing note and vitals reviewed            Future Appointments  Date Time Provider Department Center   7/6/2018 2:45 PM Ila Garcia MD [address] [place] [address]    Patient Care Team:  Ila Garcia MD as PCP - General

## 2018-04-13 NOTE — ASSESSMENT & PLAN NOTE
Blood pressure elevated on today's visit  Has been elevated on previous visits    Call patient to see that she is checking blood pressure at home and that it is well controlled  Left message requesting her to return call to our office  Avoid any over-the-counter decongestants or stimulants which could affect blood pressure as well

## 2018-04-18 ENCOUNTER — TRANSCRIBE ORDERS (OUTPATIENT)
Dept: ADMINISTRATIVE | Age: 83
End: 2018-04-18

## 2018-04-18 ENCOUNTER — APPOINTMENT (OUTPATIENT)
Dept: RADIOLOGY | Age: 83
End: 2018-04-18
Payer: COMMERCIAL

## 2018-04-18 DIAGNOSIS — M25.511 CHRONIC RIGHT SHOULDER PAIN: ICD-10-CM

## 2018-04-18 DIAGNOSIS — G89.29 CHRONIC RIGHT SHOULDER PAIN: ICD-10-CM

## 2018-04-18 PROCEDURE — 73030 X-RAY EXAM OF SHOULDER: CPT

## 2018-04-24 DIAGNOSIS — M19.011 OSTEOARTHRITIS OF RIGHT SHOULDER, UNSPECIFIED OSTEOARTHRITIS TYPE: Primary | ICD-10-CM

## 2018-04-24 PROBLEM — M25.511 CHRONIC RIGHT SHOULDER PAIN: Status: RESOLVED | Noted: 2018-04-13 | Resolved: 2018-04-24

## 2018-04-24 PROBLEM — G89.29 CHRONIC RIGHT SHOULDER PAIN: Status: RESOLVED | Noted: 2018-04-13 | Resolved: 2018-04-24

## 2018-04-24 NOTE — PROGRESS NOTES
Spoke with patient and daughter regarding xray results  Xray showing Moderate to severe osteoarthritis right shoulder  Patient currently is established with ortho Dr Marissa Frank  Daughter will call and schedule with ortho for eval for injection as well as physical therapy  I will s/o on this task

## 2018-06-18 RX ORDER — LOSARTAN POTASSIUM 100 MG/1
TABLET ORAL DAILY
Qty: 90 TABLET | Refills: 1 | OUTPATIENT
Start: 2018-06-18

## 2018-06-18 RX ORDER — FUROSEMIDE 40 MG/1
TABLET ORAL
Qty: 90 TABLET | Refills: 1 | OUTPATIENT
Start: 2018-06-18

## 2018-06-18 RX ORDER — POTASSIUM CHLORIDE 750 MG/1
TABLET, FILM COATED, EXTENDED RELEASE ORAL
Qty: 90 TABLET | Refills: 1 | OUTPATIENT
Start: 2018-06-18

## 2018-07-09 ENCOUNTER — CLINICAL SUPPORT (OUTPATIENT)
Dept: INTERNAL MEDICINE CLINIC | Facility: CLINIC | Age: 83
End: 2018-07-09
Payer: COMMERCIAL

## 2018-07-09 DIAGNOSIS — I10 ESSENTIAL HYPERTENSION: Primary | ICD-10-CM

## 2018-07-09 DIAGNOSIS — D50.9 IRON DEFICIENCY ANEMIA, UNSPECIFIED IRON DEFICIENCY ANEMIA TYPE: ICD-10-CM

## 2018-07-09 LAB
ANION GAP SERPL CALCULATED.3IONS-SCNC: 8 MMOL/L (ref 4–13)
BASOPHILS # BLD AUTO: 0.07 THOUSANDS/ΜL (ref 0–0.1)
BASOPHILS NFR BLD AUTO: 1 % (ref 0–1)
BUN SERPL-MCNC: 20 MG/DL (ref 5–25)
CALCIUM SERPL-MCNC: 9.6 MG/DL (ref 8.3–10.1)
CHLORIDE SERPL-SCNC: 92 MMOL/L (ref 100–108)
CO2 SERPL-SCNC: 31 MMOL/L (ref 21–32)
CREAT SERPL-MCNC: 0.74 MG/DL (ref 0.6–1.3)
EOSINOPHIL # BLD AUTO: 0.42 THOUSAND/ΜL (ref 0–0.61)
EOSINOPHIL NFR BLD AUTO: 7 % (ref 0–6)
ERYTHROCYTE [DISTWIDTH] IN BLOOD BY AUTOMATED COUNT: 14.1 % (ref 11.6–15.1)
GFR SERPL CREATININE-BSD FRML MDRD: 73 ML/MIN/1.73SQ M
GLUCOSE P FAST SERPL-MCNC: 88 MG/DL (ref 65–99)
HCT VFR BLD AUTO: 37.3 % (ref 34.8–46.1)
HGB BLD-MCNC: 12.3 G/DL (ref 11.5–15.4)
IMM GRANULOCYTES # BLD AUTO: 0.02 THOUSAND/UL (ref 0–0.2)
IMM GRANULOCYTES NFR BLD AUTO: 0 % (ref 0–2)
LYMPHOCYTES # BLD AUTO: 0.79 THOUSANDS/ΜL (ref 0.6–4.47)
LYMPHOCYTES NFR BLD AUTO: 13 % (ref 14–44)
MCH RBC QN AUTO: 29.6 PG (ref 26.8–34.3)
MCHC RBC AUTO-ENTMCNC: 33 G/DL (ref 31.4–37.4)
MCV RBC AUTO: 90 FL (ref 82–98)
MONOCYTES # BLD AUTO: 0.88 THOUSAND/ΜL (ref 0.17–1.22)
MONOCYTES NFR BLD AUTO: 15 % (ref 4–12)
NEUTROPHILS # BLD AUTO: 3.84 THOUSANDS/ΜL (ref 1.85–7.62)
NEUTS SEG NFR BLD AUTO: 64 % (ref 43–75)
NRBC BLD AUTO-RTO: 0 /100 WBCS
PLATELET # BLD AUTO: 220 THOUSANDS/UL (ref 149–390)
PMV BLD AUTO: 9.8 FL (ref 8.9–12.7)
POTASSIUM SERPL-SCNC: 4.1 MMOL/L (ref 3.5–5.3)
RBC # BLD AUTO: 4.15 MILLION/UL (ref 3.81–5.12)
SODIUM SERPL-SCNC: 131 MMOL/L (ref 136–145)
WBC # BLD AUTO: 6.02 THOUSAND/UL (ref 4.31–10.16)

## 2018-07-09 PROCEDURE — 80048 BASIC METABOLIC PNL TOTAL CA: CPT | Performed by: INTERNAL MEDICINE

## 2018-07-09 PROCEDURE — 85025 COMPLETE CBC W/AUTO DIFF WBC: CPT | Performed by: INTERNAL MEDICINE

## 2018-07-09 PROCEDURE — 36415 COLL VENOUS BLD VENIPUNCTURE: CPT

## 2018-07-12 ENCOUNTER — OFFICE VISIT (OUTPATIENT)
Dept: INTERNAL MEDICINE CLINIC | Facility: CLINIC | Age: 83
End: 2018-07-12
Payer: COMMERCIAL

## 2018-07-12 VITALS
DIASTOLIC BLOOD PRESSURE: 62 MMHG | HEIGHT: 62 IN | SYSTOLIC BLOOD PRESSURE: 162 MMHG | WEIGHT: 137.4 LBS | BODY MASS INDEX: 25.28 KG/M2 | HEART RATE: 55 BPM | RESPIRATION RATE: 18 BRPM | OXYGEN SATURATION: 97 % | TEMPERATURE: 98.1 F

## 2018-07-12 DIAGNOSIS — E03.9 ACQUIRED HYPOTHYROIDISM: ICD-10-CM

## 2018-07-12 DIAGNOSIS — I10 ESSENTIAL HYPERTENSION: Primary | ICD-10-CM

## 2018-07-12 DIAGNOSIS — E78.2 MIXED HYPERLIPIDEMIA: ICD-10-CM

## 2018-07-12 DIAGNOSIS — R60.0 BILATERAL EDEMA OF LOWER EXTREMITY: ICD-10-CM

## 2018-07-12 PROBLEM — E87.1 HYPONATREMIA: Status: ACTIVE | Noted: 2017-08-28

## 2018-07-12 PROBLEM — I87.321 STASIS DERMATITIS OF RIGHT LOWER EXTREMITY DUE TO PERIPHERAL VENOUS HYPERTENSION: Status: ACTIVE | Noted: 2017-07-27

## 2018-07-12 PROBLEM — R09.89 LABILE BLOOD PRESSURE: Status: ACTIVE | Noted: 2017-01-25

## 2018-07-12 PROBLEM — C16.3: Status: ACTIVE | Noted: 2017-03-09

## 2018-07-12 PROBLEM — R26.2 AMBULATORY DYSFUNCTION: Status: ACTIVE | Noted: 2017-01-19

## 2018-07-12 PROBLEM — R73.9 HYPERGLYCEMIA: Status: ACTIVE | Noted: 2017-06-19

## 2018-07-12 PROBLEM — Z96.659 S/P TOTAL KNEE REPLACEMENT: Status: ACTIVE | Noted: 2017-01-19

## 2018-07-12 PROBLEM — R91.1 LUNG NODULE: Status: ACTIVE | Noted: 2017-03-15

## 2018-07-12 PROBLEM — I45.4 BUNDLE-BRANCH BLOCK: Status: ACTIVE | Noted: 2017-01-09

## 2018-07-12 PROCEDURE — 3725F SCREEN DEPRESSION PERFORMED: CPT | Performed by: NURSE PRACTITIONER

## 2018-07-12 PROCEDURE — 99214 OFFICE O/P EST MOD 30 MIN: CPT | Performed by: NURSE PRACTITIONER

## 2018-07-12 PROCEDURE — 1101F PT FALLS ASSESS-DOCD LE1/YR: CPT | Performed by: NURSE PRACTITIONER

## 2018-07-12 RX ORDER — LOSARTAN POTASSIUM 100 MG/1
100 TABLET ORAL DAILY
Qty: 90 TABLET | Refills: 1 | Status: SHIPPED | OUTPATIENT
Start: 2018-07-12 | End: 2019-01-08 | Stop reason: SDUPTHER

## 2018-07-12 RX ORDER — POTASSIUM CHLORIDE 750 MG/1
10 TABLET, EXTENDED RELEASE ORAL DAILY
Qty: 90 TABLET | Refills: 1 | Status: SHIPPED | OUTPATIENT
Start: 2018-07-12 | End: 2019-01-31 | Stop reason: SDUPTHER

## 2018-07-12 RX ORDER — POTASSIUM CHLORIDE 750 MG/1
10 TABLET, EXTENDED RELEASE ORAL DAILY
COMMUNITY
End: 2018-07-12 | Stop reason: SDUPTHER

## 2018-07-12 NOTE — PROGRESS NOTES
Assessment/Plan:    Hypertension:  Uncontrolled  Your BP is elevated  Pt feels related to anxiety with physican office  She does not check her BP at home  Has been elevated in office previously  Did slowly come down some during visit  Pt to check her BP @ home consistently for 2 weeks and bring log and BP cuff with you to your follow-up  Continue to monitor blood pressure at home  Goal BP is < 140/90  Contact our office for consistent elevations  Recommend low sodium diet  Exercise 30 minutes three times a week as tolerated  Recommend yearly eye exam     Hyperlipidemia:  Diet controlled  Recommend healthy lifestyle choices for your cholesterol  Low fat/low cholesterol diet  Limit/avoid red meat  Eat more lean meat - chicken breast, ground turkey, fish  Exercise 30 mins at least 3 times a week as tolerated  Hypothyroid:  Stable  TSH 03/2018 WNL  Continue levothyroxine 25mcg  Stomach CA:  Keep follow-up with specialist - Dr Marcio Munroe and Dr Osito Talley  Hyponatremia:  D/W Dr Lauren Méndez  Stable NA   continue to trend     Diagnoses and all orders for this visit:    Essential hypertension  -     losartan (COZAAR) 100 MG tablet; Take 1 tablet (100 mg total) by mouth daily  -     CBC and differential; Future  -     Comprehensive metabolic panel; Future    Bilateral edema of lower extremity  -     potassium chloride (K-DUR,KLOR-CON) 10 mEq tablet; Take 1 tablet (10 mEq total) by mouth daily    Mixed hyperlipidemia  -     Lipid panel; Future    Acquired hypothyroidism  -     TSH, 3rd generation with Free T4 reflex; Future    Other orders  -     Discontinue: potassium chloride (K-DUR,KLOR-CON) 10 mEq tablet; Take 10 mEq by mouth daily        Subjective:      Patient ID: Claudette Ac is a 80 y o  female  Pt presents for a 3 month follow-up  Thyroid Problem   Presents for follow-up visit   Patient reports no anxiety, cold intolerance, constipation, depressed mood, diarrhea, fatigue, heat intolerance, palpitations, weight gain or weight loss  The symptoms have been stable  Currently taking levothyroxine 25mcg  TSH 03/2018 - WNL  Hypertension:    Pt is here to follow-up on hypertension  Co-morbidities include HLD, hyperlipidemia, Stomach CA  Associated symptoms include denies HA, vision changes, CP/SOB  Does have a hx of LE edema  Pt is currently taking losartan and lasix and tolerating well with no side effects  Pt does not checks BP at home  Pt does not exercise on routine basis  Last labs 07/2018    Hyperlipidemia:  Pt is here for follow-up hyperlipidemia  Pt is doing well with no concerns  Pt is currently taking no medication  Last lipid panel:  Due for lipid panel  Stomach CA  Followed by Heme/Onc and Dr Dolores Garay  Due for a repeat endoscopy in 2 weeks  Reviewed Hematology note:  Dr Pretty Pizano 3/15/2017:  "Stage II (T2 N1 M0) gastric cancer diagnosed 2013  She had 4 5 cm poorly differentiated adenocarcinoma arising from the antrum, with 1 out of 8 lymph nodes positive for metastatic disease status post R0 resection in 03/2013  High risk features include perineural and perivascular invasion"    Hyponatremia  Chronic hyponatremia    The following portions of the patient's history were reviewed and updated as appropriate: allergies, current medications, past family history, past medical history, past social history, past surgical history and problem list     Review of Systems   Constitutional: Negative for chills, fatigue, fever, weight gain and weight loss  HENT: Negative for sore throat (c/o "hoarsness" since endoscopy), trouble swallowing and voice change  Eyes: Negative for visual disturbance  Respiratory: Negative for cough, chest tightness, shortness of breath and wheezing  Cardiovascular: Positive for leg swelling (chronic)  Negative for chest pain and palpitations  Gastrointestinal: Negative for abdominal pain, constipation, diarrhea, nausea and vomiting  Endocrine: Negative for cold intolerance and heat intolerance  Musculoskeletal: Positive for gait problem (uses a cane)  Neurological: Negative for dizziness, syncope, light-headedness and headaches  Psychiatric/Behavioral: Negative for confusion and dysphoric mood  The patient is not nervous/anxious            Past Medical History:   Diagnosis Date    Allergic     Anemia     Arthritis     Cancer ([facility] )     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[place] [address] )     Disease of thyroid gland     Fatigue     last assessed 8/5/15; resolved 9/30/16    Gastric cancer ([place] [address] ) 2013    GERD (gastroesophageal reflux disease)     Hyperlipidemia     Hypertension     Osteoporosis     Senile cataract     last assessed 10/18/13; resolved 2/6/15    Situational depression     last assessed 2/11/15; resolved 7/20/15    Visual impairment          Current Outpatient Prescriptions:     acetaminophen (TYLENOL) 500 mg tablet, Take 1 tablet by mouth daily  , Disp: , Rfl:     albuterol (PROVENTIL HFA,VENTOLIN HFA) 90 mcg/act inhaler, Inhale 2 puffs every 6 (six) hours as needed for wheezing, Disp: 1 Inhaler, Rfl: 0    ammonium lactate (AMLACTIN) 12 % lotion, Apply topically, Disp: , Rfl:     Cholecalciferol (VITAMIN D3) 1000 units CAPS, Take 1 tablet by mouth daily, Disp: , Rfl:     ciprofloxacin (CILOXAN) 0 3 % ophthalmic solution, Apply to eye, Disp: , Rfl:     furosemide (LASIX) 40 mg tablet, Take 1 tablet by mouth daily, Disp: , Rfl:     Ganciclovir (ZIRGAN) 0 15 % GEL, Apply to eye, Disp: , Rfl:     levothyroxine 25 mcg tablet, Take 1 tablet (25 mcg total) by mouth daily, Disp: 30 tablet, Rfl: 2    losartan (COZAAR) 100 MG tablet, Take 1 tablet by mouth daily, Disp: , Rfl:     Multiple Vitamins-Minerals (CENTRUM SILVER 50+WOMEN PO), Take 1 tablet by mouth daily, Disp: , Rfl:     nebivolol (BYSTOLIC) 10 mg tablet, Take 1 tablet by mouth daily, Disp: , Rfl:     omeprazole (PriLOSEC) 40 MG capsule, Take 1 capsule by mouth daily, Disp: , Rfl:     polyethylene glycol-propylene glycol (SYSTANE) 0 4-0 3 %, Apply to eye, Disp: , Rfl:     prednisoLONE acetate (PRED FORTE) 1 % ophthalmic suspension, Apply to eye, Disp: , Rfl:     predniSONE 10 mg tablet, 4 po x 3 days 3 po 3 days, 2 po x 3 days, 1 po x 3 days, Disp: 30 tablet, Rfl: 0    pyridoxine (VITAMIN B6) 100 mg tablet, Take by mouth, Disp: , Rfl:     Allergies   Allergen Reactions    Oxycodone Hallucinations and Nausea Only    Oxycodone-Acetaminophen Hallucinations and Nausea Only    Pollen Extract     Latex Rash       Social History   Past Surgical History:   Procedure Laterality Date    COLONOSCOPY      DENTAL SURGERY      EYE SURGERY      GASTRIC RESTRICTION SURGERY      for cancer    JOINT REPLACEMENT      KNEE SURGERY Right 01/2017    TKR    LYMPH NODE BIOPSY      STOMACH SURGERY       Family History   Problem Relation Age of Onset    Heart attack Mother     Diabetes Mother     Heart disease Mother     Cancer Father     Prostate cancer Father        Objective:  BP (!) 172/62 (BP Location: Left arm, Patient Position: Sitting, Cuff Size: Adult)   Pulse 55   Temp 98 1 °F (36 7 °C) (Oral)   Resp 18   Ht 5' 2" (1 575 m) Comment: with shoes on  Wt 62 3 kg (137 lb 6 4 oz) Comment: with shoes  on  SpO2 97% Comment: room air  BMI 25 13 kg/m²     Repeat BP: 162/62  Repeat BP:  158/60     Physical Exam   Constitutional: She is oriented to person, place, and time  She appears well-developed and well-nourished  No distress  Eyes: No scleral icterus  Neck: Neck supple  No thyromegaly present  Cardiovascular: Normal rate and regular rhythm  Murmur (2/6 systolic murmur) heard  B/L trace edema R>L (chronic)   Pulmonary/Chest: Effort normal and breath sounds normal  No respiratory distress  She has no wheezes  Musculoskeletal:   Slow, ambulating with cane steady   Neurological: She is alert and oriented to person, place, and time     No focal deficits   Skin: Skin is warm and dry  Psychiatric: She has a normal mood and affect   Her behavior is normal  Judgment and thought content normal

## 2018-07-12 NOTE — PATIENT INSTRUCTIONS
Hypertension:  Your BP is elevated  Check your BP @ home consistently for 2 weeks and bring log and BP cuff with you to your follow-up  Continue to monitor blood pressure at home  Goal BP is < 140/90  Contact our office for consistent elevations  Recommend low sodium diet  Exercise 30 minutes three times a week as tolerated  Recommend yearly eye exam     Hyperlipidemia:  Recommend healthy lifestyle choices for your cholesterol  Low fat/low cholesterol diet  Limit/avoid red meat  Eat more lean meat - chicken breast, ground turkey, fish  Exercise 30 mins at least 3 times a week as tolerated  Hypothyroid:  TSH 03/2018 WNL  Continue levothyroxine 25mcg      Stomach CA:  Keep follow-up with specialist    Hyponatremia:  Continue to trend

## 2018-08-08 ENCOUNTER — OFFICE VISIT (OUTPATIENT)
Dept: INTERNAL MEDICINE CLINIC | Age: 83
End: 2018-08-08
Payer: COMMERCIAL

## 2018-08-08 VITALS
HEART RATE: 64 BPM | TEMPERATURE: 97.6 F | WEIGHT: 136.6 LBS | DIASTOLIC BLOOD PRESSURE: 68 MMHG | BODY MASS INDEX: 24.98 KG/M2 | OXYGEN SATURATION: 100 % | SYSTOLIC BLOOD PRESSURE: 188 MMHG

## 2018-08-08 DIAGNOSIS — E03.9 HYPOTHYROIDISM, UNSPECIFIED TYPE: ICD-10-CM

## 2018-08-08 DIAGNOSIS — E87.1 HYPONATREMIA: Primary | ICD-10-CM

## 2018-08-08 DIAGNOSIS — I10 ESSENTIAL HYPERTENSION: ICD-10-CM

## 2018-08-08 PROCEDURE — 99214 OFFICE O/P EST MOD 30 MIN: CPT | Performed by: INTERNAL MEDICINE

## 2018-08-08 NOTE — PROGRESS NOTES
Assessment/Plan:     1  Uncontrolled hypertension   blood pressure is still 180/60  Will increase Bystolic 20 mg daily along with the losartan 100 mg daily  Will repeat blood pressure in office in about 2 weeks  2  Hypothyroidism   TSH is within normal range  Continue with levothyroxine 25 mcg daily     3  GERD   stable continue with omeprazole 40 mg daily  Diagnoses and all orders for this visit:    Hyponatremia    Essential hypertension    Hypothyroidism, unspecified type          Subjective:          Patient ID: Mira Councilman is a 80 y o  female  Hypertension   This is a chronic problem  The problem has been waxing and waning since onset  The problem is uncontrolled  Pertinent negatives include no anxiety, chest pain, headaches, malaise/fatigue, neck pain, palpitations, peripheral edema or shortness of breath  There are no associated agents to hypertension  Risk factors for coronary artery disease include dyslipidemia and post-menopausal state  Past treatments include calcium channel blockers and beta blockers  The current treatment provides moderate improvement  Compliance problems include exercise  There is no history of angina, kidney disease or CVA  There is no history of chronic renal disease or coarctation of the aorta  The following portions of the patient's history were reviewed and updated as appropriate: allergies, current medications, past family history, past medical history, past social history, past surgical history and problem list     Review of Systems   Constitutional: Negative for fatigue, fever and malaise/fatigue  HENT: Positive for hearing loss  Negative for congestion, ear discharge, ear pain, postnasal drip, sinus pressure, sore throat, tinnitus and trouble swallowing  Eyes: Negative for discharge, itching and visual disturbance  Respiratory: Negative for cough and shortness of breath  Cardiovascular: Negative for chest pain and palpitations  Gastrointestinal: Negative for abdominal pain, diarrhea, nausea and vomiting  Endocrine: Negative for cold intolerance and polyuria  Genitourinary: Negative for difficulty urinating, dysuria and urgency  Musculoskeletal: Negative for arthralgias and neck pain  Skin: Negative for rash  Allergic/Immunologic: Negative for environmental allergies  Neurological: Negative for dizziness, weakness and headaches  Psychiatric/Behavioral: The patient is not nervous/anxious            Past Medical History:   Diagnosis Date    Allergic     Anemia     Arthritis     [place] ([place] [address] )     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[place] [address] )     Disease of thyroid gland     Fatigue     last assessed 8/5/15; resolved 9/30/16    Gastric cancer ([place] [address] ) 2013    GERD (gastroesophageal reflux disease)     Hyperlipidemia     Hypertension     Osteoporosis     Senile cataract     last assessed 10/18/13; resolved 2/6/15    Situational depression     last assessed 2/11/15; resolved 7/20/15    Visual impairment          Current Outpatient Prescriptions:     acetaminophen (TYLENOL) 500 mg tablet, Take 1 tablet by mouth daily  , Disp: , Rfl:     albuterol (PROVENTIL HFA,VENTOLIN HFA) 90 mcg/act inhaler, Inhale 2 puffs every 6 (six) hours as needed for wheezing, Disp: 1 Inhaler, Rfl: 0    ammonium lactate (AMLACTIN) 12 % lotion, Apply topically as needed  , Disp: , Rfl:     Cholecalciferol (VITAMIN D3) 1000 units CAPS, Take 1 tablet by mouth daily, Disp: , Rfl:     ciprofloxacin (CILOXAN) 0 3 % ophthalmic solution, Administer 1 drop into the left eye every other day  , Disp: , Rfl:     furosemide (LASIX) 40 mg tablet, Take 1 tablet by mouth daily, Disp: , Rfl:     Ganciclovir (ZIRGAN) 0 15 % GEL, Apply 1 Dose to eye every 3 (three) days  , Disp: , Rfl:     levothyroxine 25 mcg tablet, Take 1 tablet (25 mcg total) by mouth daily, Disp: 30 tablet, Rfl: 2    losartan (COZAAR) 100 MG tablet, Take 1 tablet (100 mg total) by mouth daily, Disp: 90 tablet, Rfl: 1    Multiple Vitamins-Minerals (CENTRUM SILVER 50+WOMEN PO), Take 1 tablet by mouth daily, Disp: , Rfl:     nebivolol (BYSTOLIC) 10 mg tablet, Take 1 tablet by mouth daily, Disp: , Rfl:     omeprazole (PriLOSEC) 40 MG capsule, Take 1 capsule by mouth daily, Disp: , Rfl:     polyethylene glycol-propylene glycol (SYSTANE) 0 4-0 3 %, Administer 2 drops to both eyes as needed  , Disp: , Rfl:     potassium chloride (K-DUR,KLOR-CON) 10 mEq tablet, Take 1 tablet (10 mEq total) by mouth daily, Disp: 90 tablet, Rfl: 1    prednisoLONE acetate (PRED FORTE) 1 % ophthalmic suspension, Administer 1 drop into the left eye every 3 (three) days  , Disp: , Rfl:     pyridoxine (VITAMIN B6) 100 mg tablet, Take 100 mg by mouth daily  , Disp: , Rfl:     Allergies   Allergen Reactions    Oxycodone Hallucinations and Nausea Only    Oxycodone-Acetaminophen Hallucinations and Nausea Only    Pollen Extract     Latex Rash       Social History   Past Surgical History:   Procedure Laterality Date    COLONOSCOPY      DENTAL SURGERY      EYE SURGERY      GASTRIC RESTRICTION SURGERY      for cancer    JOINT REPLACEMENT      KNEE SURGERY Right 01/2017    TKR    LYMPH NODE BIOPSY      STOMACH SURGERY       Family History   Problem Relation Age of Onset    Heart attack Mother     Diabetes Mother     Heart disease Mother     Cancer Father     Prostate cancer Father        Objective:  BP (!) 188/68 (BP Location: Left arm, Patient Position: Sitting, Cuff Size: Standard)   Pulse 64   Temp 97 6 °F (36 4 °C) (Tympanic)   Wt 62 kg (136 lb 9 6 oz)   SpO2 100%   BMI 24 98 kg/m²   Body mass index is 24 98 kg/m²  Physical Exam   Constitutional: She appears well-developed  HENT:   Head: Normocephalic  Left Ear: External ear normal    Mouth/Throat: Oropharynx is clear and moist    Moderate amount of wax noted in right external auditory canal   Eyes: Pupils are equal, round, and reactive to light   No scleral icterus  Neck: Normal range of motion  Neck supple  No tracheal deviation present  No thyromegaly present  Cardiovascular: Normal rate, regular rhythm and normal heart sounds  Pulmonary/Chest: Effort normal and breath sounds normal  No respiratory distress  She exhibits no tenderness  Abdominal: Soft  Bowel sounds are normal  She exhibits no mass  There is no tenderness  Musculoskeletal: Normal range of motion  Lymphadenopathy:     She has no cervical adenopathy  Neurological: She is alert  No cranial nerve deficit  Skin: Skin is warm  Psychiatric: She has a normal mood and affect   Her behavior is normal

## 2018-08-09 DIAGNOSIS — I10 ESSENTIAL HYPERTENSION: Primary | ICD-10-CM

## 2018-08-09 RX ORDER — NEBIVOLOL 10 MG/1
TABLET ORAL
Qty: 60 TABLET | Refills: 0 | Status: SHIPPED | OUTPATIENT
Start: 2018-08-09 | End: 2018-08-27 | Stop reason: SDUPTHER

## 2018-08-27 ENCOUNTER — OFFICE VISIT (OUTPATIENT)
Dept: INTERNAL MEDICINE CLINIC | Facility: CLINIC | Age: 83
End: 2018-08-27
Payer: COMMERCIAL

## 2018-08-27 VITALS
HEIGHT: 61 IN | DIASTOLIC BLOOD PRESSURE: 74 MMHG | WEIGHT: 138.8 LBS | OXYGEN SATURATION: 99 % | BODY MASS INDEX: 26.21 KG/M2 | TEMPERATURE: 97.7 F | SYSTOLIC BLOOD PRESSURE: 134 MMHG | HEART RATE: 56 BPM

## 2018-08-27 DIAGNOSIS — E03.9 HYPOTHYROIDISM, UNSPECIFIED TYPE: ICD-10-CM

## 2018-08-27 DIAGNOSIS — K21.9 GASTROESOPHAGEAL REFLUX DISEASE WITHOUT ESOPHAGITIS: ICD-10-CM

## 2018-08-27 DIAGNOSIS — I10 ESSENTIAL HYPERTENSION: Primary | ICD-10-CM

## 2018-08-27 PROCEDURE — 99213 OFFICE O/P EST LOW 20 MIN: CPT | Performed by: INTERNAL MEDICINE

## 2018-08-27 PROCEDURE — 1160F RVW MEDS BY RX/DR IN RCRD: CPT | Performed by: INTERNAL MEDICINE

## 2018-08-27 RX ORDER — LEVOTHYROXINE SODIUM 0.03 MG/1
25 TABLET ORAL DAILY
Qty: 90 TABLET | Refills: 2 | Status: SHIPPED | OUTPATIENT
Start: 2018-08-27 | End: 2018-12-11 | Stop reason: SDUPTHER

## 2018-08-27 RX ORDER — NEBIVOLOL 20 MG/1
TABLET ORAL
Qty: 90 TABLET | Refills: 2 | Status: SHIPPED | OUTPATIENT
Start: 2018-08-27 | End: 2018-11-07

## 2018-08-27 NOTE — PROGRESS NOTES
Assessment/Plan:    1  Essential hypertension   blood pressure is well controlled on present combination of Bystolic 20 mg daily and losartan 100 mg daily  2  Hypothyroidism   continue with present dose of levothyroxine  Will check TSH before next visit     Diagnoses and all orders for this visit:    Essential hypertension  -     nebivolol (BYSTOLIC) 20 MG tablet; 1 tablets once daily    Gastroesophageal reflux disease without esophagitis    Hypothyroidism, unspecified type  -     levothyroxine 25 mcg tablet; Take 1 tablet (25 mcg total) by mouth daily          Subjective:          Patient ID: Mariya Diop is a 80 y o  female  Hypertension   This is a chronic problem  The current episode started more than 1 year ago  Pertinent negatives include no anxiety, chest pain, headaches, malaise/fatigue, neck pain, palpitations, peripheral edema or shortness of breath  There are no associated agents to hypertension  There are no known risk factors for coronary artery disease  Past treatments include angiotensin blockers and beta blockers  There is no history of angina, CVA or PVD  There is no history of chronic renal disease  The following portions of the patient's history were reviewed and updated as appropriate: allergies, current medications, past family history, past medical history, past social history, past surgical history and problem list     Review of Systems   Constitutional: Negative for fatigue, fever and malaise/fatigue  HENT: Negative for congestion, ear discharge, ear pain, postnasal drip, sinus pressure, sore throat, tinnitus and trouble swallowing  Eyes: Negative for discharge, itching and visual disturbance  Respiratory: Negative for cough and shortness of breath  Cardiovascular: Negative for chest pain and palpitations  Gastrointestinal: Negative for abdominal pain, diarrhea, nausea and vomiting  Endocrine: Negative for cold intolerance and polyuria     Genitourinary: Negative for difficulty urinating, dysuria and urgency  Musculoskeletal: Negative for arthralgias and neck pain  Skin: Negative for rash  Allergic/Immunologic: Negative for environmental allergies  Neurological: Negative for dizziness, weakness and headaches  Psychiatric/Behavioral: Negative for agitation and behavioral problems  The patient is not nervous/anxious            Past Medical History:   Diagnosis Date    Allergic     Anemia     Arthritis     [facility] )     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[place] [address] )     Disease of thyroid gland     Fatigue     last assessed 8/5/15; resolved 9/30/16    Gastric cancer ([place] [address] ) 2013    GERD (gastroesophageal reflux disease)     Hyperlipidemia     Hypertension     Osteoporosis     Senile cataract     last assessed 10/18/13; resolved 2/6/15    Situational depression     last assessed 2/11/15; resolved 7/20/15    Visual impairment          Current Outpatient Prescriptions:     acetaminophen (TYLENOL) 500 mg tablet, Take 1 tablet by mouth daily  , Disp: , Rfl:     albuterol (PROVENTIL HFA,VENTOLIN HFA) 90 mcg/act inhaler, Inhale 2 puffs every 6 (six) hours as needed for wheezing, Disp: 1 Inhaler, Rfl: 0    ammonium lactate (AMLACTIN) 12 % lotion, Apply topically as needed  , Disp: , Rfl:     Cholecalciferol (VITAMIN D3) 1000 units CAPS, Take 1 tablet by mouth daily, Disp: , Rfl:     ciprofloxacin (CILOXAN) 0 3 % ophthalmic solution, Administer 1 drop into the left eye every other day  , Disp: , Rfl:     furosemide (LASIX) 40 mg tablet, Take 1 tablet by mouth daily, Disp: , Rfl:     Ganciclovir (ZIRGAN) 0 15 % GEL, Apply 1 Dose to eye every 3 (three) days  , Disp: , Rfl:     levothyroxine 25 mcg tablet, Take 1 tablet (25 mcg total) by mouth daily, Disp: 90 tablet, Rfl: 2    losartan (COZAAR) 100 MG tablet, Take 1 tablet (100 mg total) by mouth daily, Disp: 90 tablet, Rfl: 1    Multiple Vitamins-Minerals (CENTRUM SILVER 50+WOMEN PO), Take 1 tablet by mouth daily, Disp: , Rfl:     nebivolol (BYSTOLIC) 20 MG tablet, 1 tablets once daily, Disp: 90 tablet, Rfl: 2    omeprazole (PriLOSEC) 40 MG capsule, Take 1 capsule by mouth daily, Disp: , Rfl:     polyethylene glycol-propylene glycol (SYSTANE) 0 4-0 3 %, Administer 2 drops to both eyes as needed  , Disp: , Rfl:     potassium chloride (K-DUR,KLOR-CON) 10 mEq tablet, Take 1 tablet (10 mEq total) by mouth daily, Disp: 90 tablet, Rfl: 1    prednisoLONE acetate (PRED FORTE) 1 % ophthalmic suspension, Administer 1 drop into the left eye every 3 (three) days  , Disp: , Rfl:     pyridoxine (VITAMIN B6) 100 mg tablet, Take 100 mg by mouth daily  , Disp: , Rfl:     Allergies   Allergen Reactions    Oxycodone Hallucinations and Nausea Only    Oxycodone-Acetaminophen Hallucinations and Nausea Only    Pollen Extract     Latex Rash       Social History   Past Surgical History:   Procedure Laterality Date    COLONOSCOPY      DENTAL SURGERY      EYE SURGERY      GASTRIC RESTRICTION SURGERY      for cancer    JOINT REPLACEMENT      KNEE SURGERY Right 01/2017    TKR    LYMPH NODE BIOPSY      STOMACH SURGERY       Family History   Problem Relation Age of Onset    Heart attack Mother     Diabetes Mother     Heart disease Mother     Cancer Father     Prostate cancer Father        Objective:  /74 (BP Location: Left arm, Patient Position: Sitting, Cuff Size: Adult)   Pulse 56   Temp 97 7 °F (36 5 °C) (Oral)   Ht 5' 0 5" (1 537 m)   Wt 63 kg (138 lb 12 8 oz)   SpO2 99% Comment: room air  BMI 26 66 kg/m²   Body mass index is 26 66 kg/m²  Physical Exam   Constitutional: She appears well-developed  HENT:   Head: Normocephalic  Right Ear: External ear normal    Left Ear: External ear normal    Mouth/Throat: Oropharynx is clear and moist    Eyes: Pupils are equal, round, and reactive to light  No scleral icterus  Neck: Normal range of motion  Neck supple  No tracheal deviation present  No thyromegaly present  Cardiovascular: Normal rate, regular rhythm and normal heart sounds  Pulmonary/Chest: Effort normal and breath sounds normal  No respiratory distress  She exhibits no tenderness  Abdominal: Soft  Bowel sounds are normal  She exhibits no mass  There is no tenderness  Musculoskeletal: Normal range of motion  Lymphadenopathy:     She has no cervical adenopathy  Neurological: She is alert  No cranial nerve deficit  Skin: Skin is warm  Psychiatric: She has a normal mood and affect   Her behavior is normal

## 2018-09-11 RX ORDER — OMEPRAZOLE 40 MG/1
CAPSULE, DELAYED RELEASE ORAL DAILY
Qty: 90 CAPSULE | Refills: 1 | OUTPATIENT
Start: 2018-09-11

## 2018-09-15 DIAGNOSIS — E03.9 HYPOTHYROIDISM, UNSPECIFIED TYPE: ICD-10-CM

## 2018-09-17 RX ORDER — LEVOTHYROXINE SODIUM 0.03 MG/1
TABLET ORAL
Qty: 30 TABLET | Refills: 2 | OUTPATIENT
Start: 2018-09-17

## 2018-10-25 DIAGNOSIS — Z85.028 HISTORY OF GASTRIC CANCER: Primary | ICD-10-CM

## 2018-10-25 RX ORDER — OMEPRAZOLE 40 MG/1
40 CAPSULE, DELAYED RELEASE ORAL DAILY
Qty: 90 CAPSULE | Refills: 1 | Status: SHIPPED | OUTPATIENT
Start: 2018-10-25 | End: 2019-04-25 | Stop reason: SDUPTHER

## 2018-10-26 DIAGNOSIS — I10 ESSENTIAL HYPERTENSION: Primary | ICD-10-CM

## 2018-10-26 RX ORDER — FUROSEMIDE 40 MG/1
40 TABLET ORAL DAILY
Qty: 90 TABLET | Refills: 1 | Status: SHIPPED | OUTPATIENT
Start: 2018-10-26 | End: 2019-06-10 | Stop reason: SDUPTHER

## 2018-11-02 ENCOUNTER — CLINICAL SUPPORT (OUTPATIENT)
Dept: INTERNAL MEDICINE CLINIC | Facility: CLINIC | Age: 83
End: 2018-11-02
Payer: COMMERCIAL

## 2018-11-02 DIAGNOSIS — I10 ESSENTIAL HYPERTENSION: ICD-10-CM

## 2018-11-02 DIAGNOSIS — E78.2 MIXED HYPERLIPIDEMIA: ICD-10-CM

## 2018-11-02 DIAGNOSIS — E03.9 ACQUIRED HYPOTHYROIDISM: Primary | ICD-10-CM

## 2018-11-02 LAB
ALBUMIN SERPL BCP-MCNC: 3.8 G/DL (ref 3.5–5)
ALP SERPL-CCNC: 68 U/L (ref 46–116)
ALT SERPL W P-5'-P-CCNC: 24 U/L (ref 12–78)
ANION GAP SERPL CALCULATED.3IONS-SCNC: 3 MMOL/L (ref 4–13)
AST SERPL W P-5'-P-CCNC: 24 U/L (ref 5–45)
BASOPHILS # BLD AUTO: 0.08 THOUSANDS/ΜL (ref 0–0.1)
BASOPHILS NFR BLD AUTO: 2 % (ref 0–1)
BILIRUB SERPL-MCNC: 0.66 MG/DL (ref 0.2–1)
BUN SERPL-MCNC: 17 MG/DL (ref 5–25)
CALCIUM SERPL-MCNC: 9.5 MG/DL (ref 8.3–10.1)
CHLORIDE SERPL-SCNC: 95 MMOL/L (ref 100–108)
CHOLEST SERPL-MCNC: 198 MG/DL (ref 50–200)
CO2 SERPL-SCNC: 30 MMOL/L (ref 21–32)
CREAT SERPL-MCNC: 0.71 MG/DL (ref 0.6–1.3)
EOSINOPHIL # BLD AUTO: 0.36 THOUSAND/ΜL (ref 0–0.61)
EOSINOPHIL NFR BLD AUTO: 7 % (ref 0–6)
ERYTHROCYTE [DISTWIDTH] IN BLOOD BY AUTOMATED COUNT: 13.1 % (ref 11.6–15.1)
GFR SERPL CREATININE-BSD FRML MDRD: 76 ML/MIN/1.73SQ M
GLUCOSE P FAST SERPL-MCNC: 91 MG/DL (ref 65–99)
HCT VFR BLD AUTO: 35.5 % (ref 34.8–46.1)
HDLC SERPL-MCNC: 76 MG/DL (ref 40–60)
HGB BLD-MCNC: 11.6 G/DL (ref 11.5–15.4)
IMM GRANULOCYTES # BLD AUTO: 0.02 THOUSAND/UL (ref 0–0.2)
IMM GRANULOCYTES NFR BLD AUTO: 0 % (ref 0–2)
LDLC SERPL CALC-MCNC: 107 MG/DL (ref 0–100)
LYMPHOCYTES # BLD AUTO: 0.8 THOUSANDS/ΜL (ref 0.6–4.47)
LYMPHOCYTES NFR BLD AUTO: 15 % (ref 14–44)
MCH RBC QN AUTO: 29.4 PG (ref 26.8–34.3)
MCHC RBC AUTO-ENTMCNC: 32.7 G/DL (ref 31.4–37.4)
MCV RBC AUTO: 90 FL (ref 82–98)
MONOCYTES # BLD AUTO: 0.75 THOUSAND/ΜL (ref 0.17–1.22)
MONOCYTES NFR BLD AUTO: 14 % (ref 4–12)
NEUTROPHILS # BLD AUTO: 3.48 THOUSANDS/ΜL (ref 1.85–7.62)
NEUTS SEG NFR BLD AUTO: 62 % (ref 43–75)
NONHDLC SERPL-MCNC: 122 MG/DL
NRBC BLD AUTO-RTO: 0 /100 WBCS
PLATELET # BLD AUTO: 223 THOUSANDS/UL (ref 149–390)
PMV BLD AUTO: 9.2 FL (ref 8.9–12.7)
POTASSIUM SERPL-SCNC: 4.3 MMOL/L (ref 3.5–5.3)
PROT SERPL-MCNC: 6.8 G/DL (ref 6.4–8.2)
RBC # BLD AUTO: 3.94 MILLION/UL (ref 3.81–5.12)
SODIUM SERPL-SCNC: 128 MMOL/L (ref 136–145)
TRIGL SERPL-MCNC: 75 MG/DL
TSH SERPL DL<=0.05 MIU/L-ACNC: 2.05 UIU/ML (ref 0.36–3.74)
WBC # BLD AUTO: 5.49 THOUSAND/UL (ref 4.31–10.16)

## 2018-11-02 PROCEDURE — 85025 COMPLETE CBC W/AUTO DIFF WBC: CPT | Performed by: NURSE PRACTITIONER

## 2018-11-02 PROCEDURE — 36415 COLL VENOUS BLD VENIPUNCTURE: CPT

## 2018-11-02 PROCEDURE — 80053 COMPREHEN METABOLIC PANEL: CPT | Performed by: NURSE PRACTITIONER

## 2018-11-02 PROCEDURE — 84443 ASSAY THYROID STIM HORMONE: CPT | Performed by: NURSE PRACTITIONER

## 2018-11-02 PROCEDURE — 80061 LIPID PANEL: CPT | Performed by: NURSE PRACTITIONER

## 2018-11-07 ENCOUNTER — OFFICE VISIT (OUTPATIENT)
Dept: INTERNAL MEDICINE CLINIC | Facility: CLINIC | Age: 83
End: 2018-11-07
Payer: COMMERCIAL

## 2018-11-07 VITALS
HEART RATE: 56 BPM | BODY MASS INDEX: 25.89 KG/M2 | DIASTOLIC BLOOD PRESSURE: 70 MMHG | WEIGHT: 134.8 LBS | SYSTOLIC BLOOD PRESSURE: 178 MMHG | TEMPERATURE: 98.1 F | OXYGEN SATURATION: 97 %

## 2018-11-07 DIAGNOSIS — E03.9 HYPOTHYROIDISM, UNSPECIFIED TYPE: Primary | ICD-10-CM

## 2018-11-07 DIAGNOSIS — I10 ESSENTIAL HYPERTENSION: ICD-10-CM

## 2018-11-07 DIAGNOSIS — K21.9 GASTROESOPHAGEAL REFLUX DISEASE WITHOUT ESOPHAGITIS: ICD-10-CM

## 2018-11-07 DIAGNOSIS — E87.1 HYPONATREMIA: ICD-10-CM

## 2018-11-07 DIAGNOSIS — Z23 NEED FOR INFLUENZA VACCINATION: ICD-10-CM

## 2018-11-07 PROCEDURE — 90662 IIV NO PRSV INCREASED AG IM: CPT

## 2018-11-07 PROCEDURE — 99214 OFFICE O/P EST MOD 30 MIN: CPT | Performed by: INTERNAL MEDICINE

## 2018-11-07 PROCEDURE — G0008 ADMIN INFLUENZA VIRUS VAC: HCPCS

## 2018-11-07 PROCEDURE — 1036F TOBACCO NON-USER: CPT | Performed by: INTERNAL MEDICINE

## 2018-11-07 RX ORDER — AMLODIPINE BESYLATE 5 MG/1
5 TABLET ORAL DAILY
Qty: 90 TABLET | Refills: 2 | Status: SHIPPED | OUTPATIENT
Start: 2018-11-07 | End: 2019-09-25 | Stop reason: SDUPTHER

## 2018-11-07 RX ORDER — METOPROLOL TARTRATE 100 MG/1
100 TABLET ORAL EVERY 12 HOURS SCHEDULED
Qty: 180 TABLET | Refills: 2 | Status: SHIPPED | OUTPATIENT
Start: 2018-11-07 | End: 2019-09-25

## 2018-11-07 NOTE — PROGRESS NOTES
Assessment/Plan:  1  Uncontrolled hypertension  Repeat blood pressure is 178/70  Will add amlodipine 5 mg daily  B she is already on Bystolic 20 mg daily but it caused her all about more than 300 dollar a month  Would like to switch it to some generic  Will change it to metoprolol 100 mg q 12 hours  She will continue on losartan 100 mg daily  2  Hypernatremia  Advised her to be be little bit liberal on salt and we will repeat BMP in about 1 week  3  Hypothyroidism  TSH is within normal range  Will continue with present dose of levothyroxine 25 mcg daily     Diagnoses and all orders for this visit:    Hypothyroidism, unspecified type    Gastroesophageal reflux disease without esophagitis    Essential hypertension  -     Basic metabolic panel; Future  -     metoprolol tartrate (LOPRESSOR) 100 mg tablet; Take 1 tablet (100 mg total) by mouth every 12 (twelve) hours  -     amLODIPine (NORVASC) 5 mg tablet; Take 1 tablet (5 mg total) by mouth daily    Hyponatremia  -     Basic metabolic panel; Future          Subjective:          Patient ID: Andrea Reaves is a 80 y o  female  Hypertension   This is a chronic problem  The current episode started more than 1 year ago  The problem is uncontrolled  Pertinent negatives include no anxiety, chest pain, headaches, malaise/fatigue, neck pain, palpitations, peripheral edema or shortness of breath  There are no associated agents to hypertension  Risk factors for coronary artery disease include dyslipidemia  Past treatments include angiotensin blockers and beta blockers  The current treatment provides moderate improvement  There are no compliance problems  There is no history of angina  There is no history of chronic renal disease         The following portions of the patient's history were reviewed and updated as appropriate: allergies, current medications, past family history, past medical history, past social history, past surgical history and problem list     Review of Systems   Constitutional: Negative for fatigue, fever and malaise/fatigue  HENT: Negative for congestion, ear discharge, ear pain, postnasal drip, sinus pressure, sore throat, tinnitus and trouble swallowing  Eyes: Negative for discharge, itching and visual disturbance  Respiratory: Negative for cough and shortness of breath  Cardiovascular: Negative for chest pain and palpitations  Gastrointestinal: Negative for abdominal pain, diarrhea, nausea and vomiting  Endocrine: Negative for cold intolerance and polyuria  Genitourinary: Negative for difficulty urinating, dysuria and urgency  Musculoskeletal: Negative for arthralgias and neck pain  Skin: Negative for rash  Allergic/Immunologic: Negative for environmental allergies  Neurological: Negative for dizziness, weakness and headaches  Psychiatric/Behavioral: Negative for agitation and behavioral problems  The patient is not nervous/anxious            Past Medical History:   Diagnosis Date    Allergic     Anemia     Arthritis     [place] ([place] [address] )     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[address] )     Disease of thyroid gland     Fatigue     last assessed 8/5/15; resolved 9/30/16    Gastric cancer ([place] [address] ) 2013    GERD (gastroesophageal reflux disease)     Hyperlipidemia     Hypertension     Osteoporosis     Senile cataract     last assessed 10/18/13; resolved 2/6/15    Situational depression     last assessed 2/11/15; resolved 7/20/15    Visual impairment          Current Outpatient Prescriptions:     acetaminophen (TYLENOL) 500 mg tablet, Take 1 tablet by mouth daily  , Disp: , Rfl:     albuterol (PROVENTIL HFA,VENTOLIN HFA) 90 mcg/act inhaler, Inhale 2 puffs every 6 (six) hours as needed for wheezing, Disp: 1 Inhaler, Rfl: 0    ammonium lactate (AMLACTIN) 12 % lotion, Apply topically as needed  , Disp: , Rfl:     Cholecalciferol (VITAMIN D3) 1000 units CAPS, Take 1 tablet by mouth daily, Disp: , Rfl:     ciprofloxacin (CILOXAN) 0 3 % ophthalmic solution, Administer 1 drop into the left eye every other day  , Disp: , Rfl:     furosemide (LASIX) 40 mg tablet, Take 1 tablet (40 mg total) by mouth daily, Disp: 90 tablet, Rfl: 1    Ganciclovir (ZIRGAN) 0 15 % GEL, Apply 1 Dose to eye 3 (three) times a week  , Disp: , Rfl:     levothyroxine 25 mcg tablet, Take 1 tablet (25 mcg total) by mouth daily, Disp: 90 tablet, Rfl: 2    losartan (COZAAR) 100 MG tablet, Take 1 tablet (100 mg total) by mouth daily, Disp: 90 tablet, Rfl: 1    Multiple Vitamins-Minerals (CENTRUM SILVER 50+WOMEN PO), Take 1 tablet by mouth daily, Disp: , Rfl:     omeprazole (PriLOSEC) 40 MG capsule, Take 1 capsule (40 mg total) by mouth daily, Disp: 90 capsule, Rfl: 1    polyethylene glycol-propylene glycol (SYSTANE) 0 4-0 3 %, Administer 2 drops to both eyes as needed  , Disp: , Rfl:     potassium chloride (K-DUR,KLOR-CON) 10 mEq tablet, Take 1 tablet (10 mEq total) by mouth daily, Disp: 90 tablet, Rfl: 1    prednisoLONE acetate (PRED FORTE) 1 % ophthalmic suspension, Administer 1 drop into the left eye every other day  , Disp: , Rfl:     pyridoxine (VITAMIN B6) 100 mg tablet, Take 100 mg by mouth daily  , Disp: , Rfl:     amLODIPine (NORVASC) 5 mg tablet, Take 1 tablet (5 mg total) by mouth daily, Disp: 90 tablet, Rfl: 2    metoprolol tartrate (LOPRESSOR) 100 mg tablet, Take 1 tablet (100 mg total) by mouth every 12 (twelve) hours, Disp: 180 tablet, Rfl: 2    Allergies   Allergen Reactions    Oxycodone Hallucinations and Nausea Only    Oxycodone-Acetaminophen Hallucinations and Nausea Only    Pollen Extract     Latex Rash       Social History   Past Surgical History:   Procedure Laterality Date    COLONOSCOPY      DENTAL SURGERY      EYE SURGERY      GASTRIC RESTRICTION SURGERY      for cancer    JOINT REPLACEMENT      KNEE SURGERY Right 01/2017    TKR    LYMPH NODE BIOPSY      STOMACH SURGERY       Family History   Problem Relation Age of Onset    Heart attack Mother     Diabetes Mother     Heart disease Mother     Cancer Father     Prostate cancer Father        Objective:  BP (!) 178/70 (BP Location: Left arm)   Pulse 56   Temp 98 1 °F (36 7 °C) (Oral)   Wt 61 1 kg (134 lb 12 8 oz) Comment: shoes on  SpO2 97%   BMI 25 89 kg/m²   Body mass index is 25 89 kg/m²  Physical Exam   Constitutional: She appears well-developed  HENT:   Head: Normocephalic  Right Ear: External ear normal    Left Ear: External ear normal    Mouth/Throat: Oropharynx is clear and moist    Eyes: Pupils are equal, round, and reactive to light  No scleral icterus  Neck: Normal range of motion  Neck supple  No tracheal deviation present  No thyromegaly present  Cardiovascular: Normal rate, regular rhythm and normal heart sounds  No murmur heard  Pulmonary/Chest: Effort normal and breath sounds normal  No respiratory distress  She exhibits no tenderness  Abdominal: Soft  Bowel sounds are normal  She exhibits no mass  There is no tenderness  Musculoskeletal: Normal range of motion  She exhibits edema  Lymphadenopathy:     She has no cervical adenopathy  Neurological: She is alert  No cranial nerve deficit  Skin: Skin is warm  Psychiatric: She has a normal mood and affect

## 2018-12-06 ENCOUNTER — OFFICE VISIT (OUTPATIENT)
Dept: INTERNAL MEDICINE CLINIC | Age: 83
End: 2018-12-06
Payer: COMMERCIAL

## 2018-12-06 VITALS
SYSTOLIC BLOOD PRESSURE: 148 MMHG | HEIGHT: 61 IN | DIASTOLIC BLOOD PRESSURE: 56 MMHG | OXYGEN SATURATION: 98 % | HEART RATE: 55 BPM | TEMPERATURE: 97.5 F | BODY MASS INDEX: 25.34 KG/M2 | WEIGHT: 134.2 LBS

## 2018-12-06 DIAGNOSIS — I10 ESSENTIAL HYPERTENSION: Primary | ICD-10-CM

## 2018-12-06 DIAGNOSIS — E78.2 MIXED HYPERLIPIDEMIA: ICD-10-CM

## 2018-12-06 PROCEDURE — 99213 OFFICE O/P EST LOW 20 MIN: CPT | Performed by: INTERNAL MEDICINE

## 2018-12-06 PROCEDURE — 4040F PNEUMOC VAC/ADMIN/RCVD: CPT | Performed by: INTERNAL MEDICINE

## 2018-12-06 PROCEDURE — 1160F RVW MEDS BY RX/DR IN RCRD: CPT | Performed by: INTERNAL MEDICINE

## 2018-12-06 NOTE — PROGRESS NOTES
Assessment/Plan:    1  Essential hypertension  Blood pressure is well controlled on present regimen  Will continue the same meds  Diagnoses and all orders for this visit:    Essential hypertension    Mixed hyperlipidemia          Subjective:          Patient ID: Stephanie Davidson is a 80 y o  female  Hypertension   This is a chronic problem  The current episode started more than 1 year ago  The problem is controlled  Pertinent negatives include no anxiety, chest pain, headaches, malaise/fatigue, neck pain, palpitations or shortness of breath  There are no associated agents to hypertension  Risk factors for coronary artery disease include dyslipidemia and post-menopausal state  Past treatments include calcium channel blockers and beta blockers  The current treatment provides significant improvement  There is no history of angina  There is no history of chronic renal disease  The following portions of the patient's history were reviewed and updated as appropriate: allergies, current medications, past family history, past medical history, past social history, past surgical history and problem list     Review of Systems   Constitutional: Negative for fatigue, fever and malaise/fatigue  HENT: Negative for congestion, ear discharge, ear pain, postnasal drip, sinus pressure, sore throat, tinnitus and trouble swallowing  Eyes: Negative for discharge, itching and visual disturbance  Respiratory: Negative for cough and shortness of breath  Cardiovascular: Negative for chest pain and palpitations  Gastrointestinal: Negative for abdominal pain, diarrhea, nausea and vomiting  Endocrine: Negative for cold intolerance and polyuria  Genitourinary: Negative for difficulty urinating, dysuria and urgency  Musculoskeletal: Negative for arthralgias and neck pain  Skin: Negative for rash  Allergic/Immunologic: Negative for environmental allergies     Neurological: Negative for dizziness, weakness and headaches  Psychiatric/Behavioral: Negative for agitation and behavioral problems  The patient is not nervous/anxious            Past Medical History:   Diagnosis Date    Allergic     Anemia     Arthritis     [facility] ([facility] )     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[facility] )     Disease of thyroid gland     Fatigue     last assessed 8/5/15; resolved 9/30/16    Gastric cancer ([facility] ) 2013    GERD (gastroesophageal reflux disease)     Hyperlipidemia     Hypertension     Osteoporosis     Senile cataract     last assessed 10/18/13; resolved 2/6/15    Situational depression     last assessed 2/11/15; resolved 7/20/15    Visual impairment          Current Outpatient Prescriptions:     acetaminophen (TYLENOL) 500 mg tablet, Take 1 tablet by mouth daily  , Disp: , Rfl:     albuterol (PROVENTIL HFA,VENTOLIN HFA) 90 mcg/act inhaler, Inhale 2 puffs every 6 (six) hours as needed for wheezing, Disp: 1 Inhaler, Rfl: 0    amLODIPine (NORVASC) 5 mg tablet, Take 1 tablet (5 mg total) by mouth daily, Disp: 90 tablet, Rfl: 2    ammonium lactate (AMLACTIN) 12 % lotion, Apply topically as needed  , Disp: , Rfl:     Cholecalciferol (VITAMIN D3) 1000 units CAPS, Take 1 tablet by mouth daily, Disp: , Rfl:     ciprofloxacin (CILOXAN) 0 3 % ophthalmic solution, Administer 1 drop into the left eye every other day  , Disp: , Rfl:     furosemide (LASIX) 40 mg tablet, Take 1 tablet (40 mg total) by mouth daily, Disp: 90 tablet, Rfl: 1    Ganciclovir (ZIRGAN) 0 15 % GEL, Apply 1 Dose to eye 3 (three) times a week  , Disp: , Rfl:     levothyroxine 25 mcg tablet, Take 1 tablet (25 mcg total) by mouth daily, Disp: 90 tablet, Rfl: 2    losartan (COZAAR) 100 MG tablet, Take 1 tablet (100 mg total) by mouth daily, Disp: 90 tablet, Rfl: 1    metoprolol tartrate (LOPRESSOR) 100 mg tablet, Take 1 tablet (100 mg total) by mouth every 12 (twelve) hours, Disp: 180 tablet, Rfl: 2    Multiple Vitamins-Minerals (CENTRUM SILVER 50+WOMEN PO), Take 1 tablet by mouth daily, Disp: , Rfl:     omeprazole (PriLOSEC) 40 MG capsule, Take 1 capsule (40 mg total) by mouth daily, Disp: 90 capsule, Rfl: 1    polyethylene glycol-propylene glycol (SYSTANE) 0 4-0 3 %, Administer 2 drops to both eyes as needed  , Disp: , Rfl:     potassium chloride (K-DUR,KLOR-CON) 10 mEq tablet, Take 1 tablet (10 mEq total) by mouth daily, Disp: 90 tablet, Rfl: 1    prednisoLONE acetate (PRED FORTE) 1 % ophthalmic suspension, Administer 1 drop into the left eye every other day  , Disp: , Rfl:     pyridoxine (VITAMIN B6) 100 mg tablet, Take 100 mg by mouth daily  , Disp: , Rfl:     Allergies   Allergen Reactions    Oxycodone Hallucinations and Nausea Only    Oxycodone-Acetaminophen Hallucinations and Nausea Only    Pollen Extract     Latex Rash       Social History   Past Surgical History:   Procedure Laterality Date    COLONOSCOPY      DENTAL SURGERY      EYE SURGERY      GASTRIC RESTRICTION SURGERY      for cancer    JOINT REPLACEMENT      KNEE SURGERY Right 01/2017    TKR    LYMPH NODE BIOPSY      STOMACH SURGERY       Family History   Problem Relation Age of Onset    Heart attack Mother     Diabetes Mother     Heart disease Mother     Prostate cancer Father        Objective:  /56 (BP Location: Left arm, Patient Position: Sitting, Cuff Size: Adult)   Pulse 55   Temp 97 5 °F (36 4 °C) (Tympanic)   Ht 5' 1 22" (1 555 m) Comment: with light shoes  Wt 60 9 kg (134 lb 3 2 oz) Comment: with light shoes  SpO2 98%   BMI 25 17 kg/m²   Body mass index is 25 17 kg/m²  Physical Exam   Constitutional: She appears well-developed  HENT:   Head: Normocephalic  Right Ear: External ear normal    Left Ear: External ear normal    Mouth/Throat: Oropharynx is clear and moist    Eyes: Pupils are equal, round, and reactive to light  No scleral icterus  Neck: Normal range of motion  Neck supple  No tracheal deviation present  No thyromegaly present     Cardiovascular: Normal rate, regular rhythm and normal heart sounds  Pulmonary/Chest: Effort normal and breath sounds normal  No respiratory distress  She exhibits no tenderness  Abdominal: Soft  Bowel sounds are normal  She exhibits no mass  There is no tenderness  Musculoskeletal: Normal range of motion  She exhibits edema  Lymphadenopathy:     She has no cervical adenopathy  Neurological: She is alert  No cranial nerve deficit  Skin: Skin is warm  Psychiatric: She has a normal mood and affect

## 2018-12-11 DIAGNOSIS — E03.9 HYPOTHYROIDISM, UNSPECIFIED TYPE: ICD-10-CM

## 2018-12-11 RX ORDER — LEVOTHYROXINE SODIUM 0.03 MG/1
25 TABLET ORAL DAILY
Qty: 90 TABLET | Refills: 1 | Status: SHIPPED | OUTPATIENT
Start: 2018-12-11 | End: 2020-02-10 | Stop reason: SDUPTHER

## 2019-01-03 DIAGNOSIS — R60.0 BILATERAL EDEMA OF LOWER EXTREMITY: ICD-10-CM

## 2019-01-03 DIAGNOSIS — I10 ESSENTIAL HYPERTENSION: ICD-10-CM

## 2019-01-04 RX ORDER — LOSARTAN POTASSIUM 100 MG/1
TABLET ORAL
Qty: 90 TABLET | Refills: 1 | OUTPATIENT
Start: 2019-01-04

## 2019-01-04 RX ORDER — POTASSIUM CHLORIDE 750 MG/1
TABLET, EXTENDED RELEASE ORAL
Qty: 90 TABLET | Refills: 1 | OUTPATIENT
Start: 2019-01-04

## 2019-01-08 DIAGNOSIS — I10 ESSENTIAL HYPERTENSION: ICD-10-CM

## 2019-01-08 RX ORDER — LOSARTAN POTASSIUM 100 MG/1
100 TABLET ORAL DAILY
Qty: 90 TABLET | Refills: 1 | Status: SHIPPED | OUTPATIENT
Start: 2019-01-08 | End: 2019-07-03 | Stop reason: SDUPTHER

## 2019-01-25 ENCOUNTER — CLINICAL SUPPORT (OUTPATIENT)
Dept: INTERNAL MEDICINE CLINIC | Facility: CLINIC | Age: 84
End: 2019-01-25
Payer: COMMERCIAL

## 2019-01-25 DIAGNOSIS — E87.1 HYPONATREMIA: ICD-10-CM

## 2019-01-25 DIAGNOSIS — I10 ESSENTIAL HYPERTENSION: Primary | ICD-10-CM

## 2019-01-25 LAB
ANION GAP SERPL CALCULATED.3IONS-SCNC: 5 MMOL/L (ref 4–13)
BUN SERPL-MCNC: 25 MG/DL (ref 5–25)
CALCIUM SERPL-MCNC: 9.2 MG/DL (ref 8.3–10.1)
CHLORIDE SERPL-SCNC: 95 MMOL/L (ref 100–108)
CO2 SERPL-SCNC: 29 MMOL/L (ref 21–32)
CREAT SERPL-MCNC: 0.73 MG/DL (ref 0.6–1.3)
GFR SERPL CREATININE-BSD FRML MDRD: 74 ML/MIN/1.73SQ M
GLUCOSE P FAST SERPL-MCNC: 97 MG/DL (ref 65–99)
POTASSIUM SERPL-SCNC: 4.5 MMOL/L (ref 3.5–5.3)
SODIUM SERPL-SCNC: 129 MMOL/L (ref 136–145)

## 2019-01-25 PROCEDURE — 80048 BASIC METABOLIC PNL TOTAL CA: CPT | Performed by: INTERNAL MEDICINE

## 2019-01-25 PROCEDURE — 36415 COLL VENOUS BLD VENIPUNCTURE: CPT

## 2019-01-31 DIAGNOSIS — R60.0 BILATERAL EDEMA OF LOWER EXTREMITY: ICD-10-CM

## 2019-01-31 RX ORDER — POTASSIUM CHLORIDE 750 MG/1
10 TABLET, EXTENDED RELEASE ORAL DAILY
Qty: 90 TABLET | Refills: 1 | Status: SHIPPED | OUTPATIENT
Start: 2019-01-31 | End: 2019-09-25

## 2019-02-04 ENCOUNTER — OFFICE VISIT (OUTPATIENT)
Dept: INTERNAL MEDICINE CLINIC | Facility: CLINIC | Age: 84
End: 2019-02-04
Payer: COMMERCIAL

## 2019-02-04 VITALS
DIASTOLIC BLOOD PRESSURE: 58 MMHG | SYSTOLIC BLOOD PRESSURE: 162 MMHG | HEIGHT: 61 IN | WEIGHT: 134.6 LBS | BODY MASS INDEX: 25.41 KG/M2 | OXYGEN SATURATION: 98 % | HEART RATE: 52 BPM | TEMPERATURE: 98.2 F

## 2019-02-04 DIAGNOSIS — I10 ESSENTIAL HYPERTENSION: ICD-10-CM

## 2019-02-04 DIAGNOSIS — E87.1 HYPONATREMIA: ICD-10-CM

## 2019-02-04 DIAGNOSIS — K21.9 GASTROESOPHAGEAL REFLUX DISEASE WITHOUT ESOPHAGITIS: Primary | ICD-10-CM

## 2019-02-04 DIAGNOSIS — R60.0 BILATERAL EDEMA OF LOWER EXTREMITY: ICD-10-CM

## 2019-02-04 DIAGNOSIS — E03.9 HYPOTHYROIDISM, UNSPECIFIED TYPE: ICD-10-CM

## 2019-02-04 PROBLEM — J40 BRONCHITIS: Status: RESOLVED | Noted: 2018-04-09 | Resolved: 2019-02-04

## 2019-02-04 PROCEDURE — 99214 OFFICE O/P EST MOD 30 MIN: CPT | Performed by: INTERNAL MEDICINE

## 2019-02-04 PROCEDURE — 1036F TOBACCO NON-USER: CPT | Performed by: INTERNAL MEDICINE

## 2019-02-04 PROCEDURE — 1160F RVW MEDS BY RX/DR IN RCRD: CPT | Performed by: INTERNAL MEDICINE

## 2019-02-04 NOTE — ASSESSMENT & PLAN NOTE
Stable  Encourage she increase her oral hydration  Currently drinking 1 bottle (12 oz) water day  Recommend she drink a total of 3 bottles per day

## 2019-02-04 NOTE — PROGRESS NOTES
Assessment/Plan:    Hyponatremia  Stable  Encourage she increase her oral hydration  Currently drinking 1 bottle (12 oz) water day  Recommend she drink a total of 3 bottles per day  Bilateral edema of lower extremity  Stable  Continue with Lasix 40 mg daily  Essential hypertension  Stable  Given age, BP ok to be elevated at 150 mm Hg  Continue with current regimen  Hypothyroidism  Continue with levothyroxine 25 mcg daily  Gastroesophageal reflux disease  Continue with omeprazole  Stable  Diagnoses and all orders for this visit:    Gastroesophageal reflux disease without esophagitis    Hypothyroidism, unspecified type    Essential hypertension    Hyponatremia    Bilateral edema of lower extremity          Subjective:      Patient ID: Aristides Jamison is a 80 y o  female  80year old female is seen today for follow-up chronic conditions  BMP reviewed today, sodium remained stable at 129  Otherwise, electrolytes and kidney function have remained stable  Regarding antihypertensive regimen, she has been compliant with her current regimen which consist of metoprolol 100 mg twice a day, losartan 100 mg daily, amlodipine 5 mg daily, and Lasix 40 mg daily  She is also on potassium replacement therapy  She does not check her blood pressure at home  Otherwise, no active complaints or concerns  Hypertension   This is a chronic problem  The current episode started more than 1 year ago  The problem is unchanged  The problem is controlled  Pertinent negatives include no anxiety, blurred vision, chest pain, headaches, malaise/fatigue, neck pain, orthopnea, palpitations, peripheral edema, PND, shortness of breath or sweats  Risk factors for coronary artery disease include post-menopausal state  Past treatments include beta blockers, angiotensin blockers, calcium channel blockers and diuretics  The current treatment provides moderate improvement  There are no compliance problems    Identifiable causes of hypertension include a thyroid problem  Heartburn   She reports no abdominal pain, no belching, no chest pain, no choking, no coughing, no dysphagia, no early satiety, no globus sensation, no heartburn, no hoarse voice, no nausea, no sore throat, no stridor, no tooth decay, no water brash or no wheezing  Pertinent negatives include no fatigue or weight loss  She has tried a PPI for the symptoms  The treatment provided moderate relief  Thyroid Problem   Presents for follow-up visit  Patient reports no anxiety, cold intolerance, constipation, depressed mood, diaphoresis, diarrhea, dry skin, fatigue, hair loss, heat intolerance, hoarse voice, leg swelling, menstrual problem, nail problem, palpitations, tremors, visual change, weight gain or weight loss  The following portions of the patient's history were reviewed and updated as appropriate: allergies, current medications, past family history, past medical history, past social history, past surgical history and problem list     Review of Systems   Constitutional: Negative for activity change, appetite change, chills, diaphoresis, fatigue, fever, malaise/fatigue, weight gain and weight loss  HENT: Negative for congestion, hoarse voice, postnasal drip, rhinorrhea, sinus pain, sinus pressure, sneezing and sore throat  Eyes: Negative for blurred vision and visual disturbance  Respiratory: Negative for apnea, cough, choking, chest tightness, shortness of breath and wheezing  Cardiovascular: Negative for chest pain, palpitations, orthopnea, leg swelling and PND  Gastrointestinal: Negative for abdominal distention, abdominal pain, anal bleeding, blood in stool, constipation, diarrhea, dysphagia, heartburn, nausea and vomiting  Endocrine: Negative for cold intolerance and heat intolerance  Genitourinary: Negative for difficulty urinating, dysuria, hematuria and menstrual problem  Musculoskeletal: Negative  Negative for neck pain     Skin: Negative  Neurological: Negative for dizziness, tremors, weakness, light-headedness, numbness and headaches  Hematological: Negative for adenopathy  Psychiatric/Behavioral: Negative for agitation, sleep disturbance and suicidal ideas  The patient is not nervous/anxious  All other systems reviewed and are negative          Past Medical History:   Diagnosis Date    Allergic     Anemia     Arthritis     Cancer ([facility] )     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[place] [address] )     Disease of thyroid gland     Fatigue     last assessed 8/5/15; resolved 9/30/16    Gastric cancer ([place] [address] ) 2013    GERD (gastroesophageal reflux disease)     Hyperlipidemia     Hypertension     Osteoporosis     Senile cataract     last assessed 10/18/13; resolved 2/6/15    Situational depression     last assessed 2/11/15; resolved 7/20/15    Visual impairment          Current Outpatient Prescriptions:     acetaminophen (TYLENOL) 500 mg tablet, Take 1 tablet by mouth daily  , Disp: , Rfl:     albuterol (PROVENTIL HFA,VENTOLIN HFA) 90 mcg/act inhaler, Inhale 2 puffs every 6 (six) hours as needed for wheezing, Disp: 1 Inhaler, Rfl: 0    amLODIPine (NORVASC) 5 mg tablet, Take 1 tablet (5 mg total) by mouth daily, Disp: 90 tablet, Rfl: 2    ammonium lactate (AMLACTIN) 12 % lotion, Apply topically as needed  , Disp: , Rfl:     Cholecalciferol (VITAMIN D3) 1000 units CAPS, Take 1 tablet by mouth daily, Disp: , Rfl:     ciprofloxacin (CILOXAN) 0 3 % ophthalmic solution, Administer 1 drop into the left eye every other day  , Disp: , Rfl:     furosemide (LASIX) 40 mg tablet, Take 1 tablet (40 mg total) by mouth daily, Disp: 90 tablet, Rfl: 1    Ganciclovir (ZIRGAN) 0 15 % GEL, Apply 1 Dose to eye 3 (three) times a week  , Disp: , Rfl:     levothyroxine 25 mcg tablet, Take 1 tablet (25 mcg total) by mouth daily, Disp: 90 tablet, Rfl: 1    losartan (COZAAR) 100 MG tablet, Take 1 tablet (100 mg total) by mouth daily, Disp: 90 tablet, Rfl: 1    metoprolol tartrate (LOPRESSOR) 100 mg tablet, Take 1 tablet (100 mg total) by mouth every 12 (twelve) hours, Disp: 180 tablet, Rfl: 2    Multiple Vitamins-Minerals (CENTRUM SILVER 50+WOMEN PO), Take 1 tablet by mouth daily, Disp: , Rfl:     omeprazole (PriLOSEC) 40 MG capsule, Take 1 capsule (40 mg total) by mouth daily, Disp: 90 capsule, Rfl: 1    polyethylene glycol-propylene glycol (SYSTANE) 0 4-0 3 %, Administer 2 drops to both eyes as needed  , Disp: , Rfl:     potassium chloride (K-DUR,KLOR-CON) 10 mEq tablet, Take 1 tablet (10 mEq total) by mouth daily, Disp: 90 tablet, Rfl: 1    prednisoLONE acetate (PRED FORTE) 1 % ophthalmic suspension, Administer 1 drop into the left eye every other day  , Disp: , Rfl:     pyridoxine (VITAMIN B6) 100 mg tablet, Take 100 mg by mouth daily  , Disp: , Rfl:     Allergies   Allergen Reactions    Oxycodone Hallucinations and Nausea Only    Oxycodone-Acetaminophen Hallucinations and Nausea Only    Pollen Extract     Latex Rash       Social History   Past Surgical History:   Procedure Laterality Date    COLONOSCOPY      DENTAL SURGERY      EYE SURGERY      GASTRIC RESTRICTION SURGERY      for cancer    JOINT REPLACEMENT      KNEE SURGERY Right 01/2017    TKR    LYMPH NODE BIOPSY      STOMACH SURGERY       Family History   Problem Relation Age of Onset    Heart attack Mother     Diabetes Mother     Heart disease Mother     Prostate cancer Father        Objective:  /58 (BP Location: Right arm, Patient Position: Sitting, Cuff Size: Adult)   Pulse (!) 52   Temp 98 2 °F (36 8 °C) (Oral)   Ht 5' 1" (1 549 m) Comment: with shoes on  Wt 61 1 kg (134 lb 9 6 oz) Comment: with shoes on  SpO2 98% Comment: room air  BMI 25 43 kg/m²     Recent Results (from the past 1344 hour(s))   Basic metabolic panel    Collection Time: 01/25/19 10:24 AM   Result Value Ref Range    Sodium 129 (L) 136 - 145 mmol/L    Potassium 4 5 3 5 - 5 3 mmol/L    Chloride 95 (L) 100 - 108 mmol/L CO2 29 21 - 32 mmol/L    ANION GAP 5 4 - 13 mmol/L    BUN 25 5 - 25 mg/dL    Creatinine 0 73 0 60 - 1 30 mg/dL    Glucose, Fasting 97 65 - 99 mg/dL    Calcium 9 2 8 3 - 10 1 mg/dL    eGFR 74 ml/min/1 73sq m            Physical Exam   Constitutional: She is oriented to person, place, and time  She appears well-developed and well-nourished  No distress  HENT:   Head: Normocephalic and atraumatic  Eyes: Pupils are equal, round, and reactive to light  Conjunctivae and EOM are normal  Right eye exhibits no discharge  Left eye exhibits no discharge  Neck: Normal range of motion  Neck supple  No JVD present  No thyromegaly present  Cardiovascular: Normal rate, regular rhythm and intact distal pulses  Exam reveals no gallop and no friction rub  Murmur heard  Systolic murmur is present with a grade of 2/6   Pulmonary/Chest: Effort normal and breath sounds normal  No respiratory distress  She has no wheezes  She has no rales  She exhibits no tenderness  Abdominal: Soft  She exhibits no distension  There is no tenderness  Musculoskeletal: Normal range of motion  She exhibits no edema, tenderness or deformity  Lymphadenopathy:     She has no cervical adenopathy  Neurological: She is alert and oriented to person, place, and time  No cranial nerve deficit  Coordination normal    Skin: Skin is warm and dry  No rash noted  She is not diaphoretic  No erythema  No pallor  Psychiatric: She has a normal mood and affect  Her behavior is normal  Judgment and thought content normal    Nursing note and vitals reviewed

## 2019-03-26 ENCOUNTER — OFFICE VISIT (OUTPATIENT)
Dept: INTERNAL MEDICINE CLINIC | Age: 84
End: 2019-03-26
Payer: COMMERCIAL

## 2019-03-26 VITALS
SYSTOLIC BLOOD PRESSURE: 138 MMHG | BODY MASS INDEX: 26.34 KG/M2 | OXYGEN SATURATION: 98 % | DIASTOLIC BLOOD PRESSURE: 60 MMHG | HEART RATE: 62 BPM | WEIGHT: 139.4 LBS | TEMPERATURE: 96.9 F

## 2019-03-26 DIAGNOSIS — E03.9 HYPOTHYROIDISM, UNSPECIFIED TYPE: ICD-10-CM

## 2019-03-26 DIAGNOSIS — I87.2 VENOUS (PERIPHERAL) INSUFFICIENCY: Primary | ICD-10-CM

## 2019-03-26 DIAGNOSIS — L97.311 SKIN ULCER OF RIGHT ANKLE, LIMITED TO BREAKDOWN OF SKIN (HCC): ICD-10-CM

## 2019-03-26 DIAGNOSIS — K21.9 GASTROESOPHAGEAL REFLUX DISEASE WITHOUT ESOPHAGITIS: ICD-10-CM

## 2019-03-26 DIAGNOSIS — I10 ESSENTIAL HYPERTENSION: ICD-10-CM

## 2019-03-26 DIAGNOSIS — Z00.00 MEDICARE ANNUAL WELLNESS VISIT, SUBSEQUENT: ICD-10-CM

## 2019-03-26 PROCEDURE — G0439 PPPS, SUBSEQ VISIT: HCPCS | Performed by: INTERNAL MEDICINE

## 2019-03-26 PROCEDURE — 1125F AMNT PAIN NOTED PAIN PRSNT: CPT | Performed by: INTERNAL MEDICINE

## 2019-03-26 PROCEDURE — 99214 OFFICE O/P EST MOD 30 MIN: CPT | Performed by: INTERNAL MEDICINE

## 2019-03-26 PROCEDURE — 1170F FXNL STATUS ASSESSED: CPT | Performed by: INTERNAL MEDICINE

## 2019-03-26 PROCEDURE — 1160F RVW MEDS BY RX/DR IN RCRD: CPT | Performed by: INTERNAL MEDICINE

## 2019-03-26 PROCEDURE — 1036F TOBACCO NON-USER: CPT | Performed by: INTERNAL MEDICINE

## 2019-03-26 NOTE — PROGRESS NOTES
Assessment and Plan:    Problem List Items Addressed This Visit     None        Health Maintenance Due   Topic Date Due    Medicare Annual Wellness Visit (AWV)  10/09/1930    BMI: Followup Plan  10/09/1948    DTaP,Tdap,and Td Vaccines (1 - Tdap) 04/17/2008         HPI:  Leda Celis is a 80 y o  female here for her annual Medicare wellness visit    Patient Active Problem List   Diagnosis    Anemia    Essential hypertension    Gastroesophageal reflux disease    Hypothyroidism    Osteoarthritis, hand    Osteoarthritis of right shoulder    Ambulatory dysfunction    Anemia due to blood loss, chronic    Bilateral edema of lower extremity    Bundle-branch block    Hyperglycemia    Hyperlipidemia    Hyponatremia    Labile blood pressure    Lung nodule    Neoplasm of uncertain behavior of lung    Osteoarthrosis of knee    Primary malignant neoplasm of pyloric antrum (HCC)    S/P total knee replacement    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    Vasculitis (HCC)    Venous (peripheral) insufficiency     Past Medical History:   Diagnosis Date    Allergic     Anemia     Arthritis     Cancer ([facility] )     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[facility] )     Disease of thyroid gland     Fatigue     last assessed 8/5/15; resolved 9/30/16    G[facility] ([facility] ) 2013    GERD (gastroesophageal reflux disease)     Hyperlipidemia     Hypertension     Osteoporosis     Senile cataract     last assessed 10/18/13; resolved 2/6/15    Situational depression     last assessed 2/11/15; resolved 7/20/15    Visual impairment      Past Surgical History:   Procedure Laterality Date    COLONOSCOPY      DENTAL SURGERY      EYE SURGERY      GASTRIC RESTRICTION SURGERY      for cancer    JOINT REPLACEMENT      KNEE SURGERY Right 01/2017    TKR    LYMPH NODE BIOPSY      STOMACH SURGERY       Family History   Problem Relation Age of Onset    Heart attack Mother     Diabetes Mother  Heart disease Mother     Prostate cancer Father      Social History     Tobacco Use   Smoking Status Never Smoker   Smokeless Tobacco Never Used     Social History     Substance and Sexual Activity   Alcohol Use No      Social History     Substance and Sexual Activity   Drug Use No       Current Outpatient Medications   Medication Sig Dispense Refill    acetaminophen (TYLENOL) 500 mg tablet Take 1 tablet by mouth daily        albuterol (PROVENTIL HFA,VENTOLIN HFA) 90 mcg/act inhaler Inhale 2 puffs every 6 (six) hours as needed for wheezing 1 Inhaler 0    amLODIPine (NORVASC) 5 mg tablet Take 1 tablet (5 mg total) by mouth daily 90 tablet 2    ammonium lactate (AMLACTIN) 12 % lotion Apply topically as needed        Cholecalciferol (VITAMIN D3) 1000 units CAPS Take 1 tablet by mouth daily      ciprofloxacin (CILOXAN) 0 3 % ophthalmic solution Administer 1 drop into the left eye every other day        furosemide (LASIX) 40 mg tablet Take 1 tablet (40 mg total) by mouth daily 90 tablet 1    Ganciclovir (ZIRGAN) 0 15 % GEL Apply 1 Dose to eye 3 (three) times a week        levothyroxine 25 mcg tablet Take 1 tablet (25 mcg total) by mouth daily 90 tablet 1    losartan (COZAAR) 100 MG tablet Take 1 tablet (100 mg total) by mouth daily 90 tablet 1    metoprolol tartrate (LOPRESSOR) 100 mg tablet Take 1 tablet (100 mg total) by mouth every 12 (twelve) hours 180 tablet 2    Multiple Vitamins-Minerals (CENTRUM SILVER 50+WOMEN PO) Take 1 tablet by mouth daily      omeprazole (PriLOSEC) 40 MG capsule Take 1 capsule (40 mg total) by mouth daily 90 capsule 1    polyethylene glycol-propylene glycol (SYSTANE) 0 4-0 3 % Administer 2 drops to both eyes as needed        potassium chloride (K-DUR,KLOR-CON) 10 mEq tablet Take 1 tablet (10 mEq total) by mouth daily 90 tablet 1    prednisoLONE acetate (PRED FORTE) 1 % ophthalmic suspension Administer 1 drop into the left eye every other day        pyridoxine (VITAMIN B6) 100 mg tablet Take 100 mg by mouth daily         No current facility-administered medications for this visit  Allergies   Allergen Reactions    Oxycodone Hallucinations and Nausea Only    Oxycodone-Acetaminophen Hallucinations and Nausea Only    Pollen Extract     Latex Rash     Immunization History   Administered Date(s) Administered    INFLUENZA 11/18/2005, 10/20/2006, 11/02/2007, 10/24/2008, 10/15/2010    Influenza Split High Dose Preservative Free IM 11/06/2013, 10/14/2014, 11/18/2015, 12/12/2016, 12/19/2017    Influenza TIV (IM) 12/09/2011, 11/16/2012    Influenza, high dose seasonal 0 5 mL 11/07/2018    Pneumococcal Conjugate 13-Valent 07/21/2015, 12/15/2016    Pneumococcal Polysaccharide PPV23 04/22/2005    Td (adult), adsorbed 04/16/2008       Patient Care Team:  Sara Gage MD as PCP - General    Medicare Screening Tests and Risk Assessments: Bertin Celis is here for her Subsequent Wellness visit  Last Medicare Wellness visit information reviewed, patient interviewed and updates made to the record today  Health Risk Assessment:  Patient rates overall health as good  Patient feels that their physical health rating is Same  Eyesight was rated as Slightly worse  Hearing was rated as Slightly worse  Patient feels that their emotional and mental health rating is Slightly better  Pain experienced by patient in the last 7 days has been Some  Patient's pain rating has been 6/10  Patient states that she has experienced no weight loss or gain in last 6 months  Emotional/Mental Health:  Patient has been feeling nervous/anxious  PHQ-9 Depression Screening:    Frequency of the following problems over the past two weeks:      1  Little interest or pleasure in doing things: 0 - not at all      2  Feeling down, depressed, or hopeless: 1 - several days      3  Trouble falling or staying asleep, or sleeping too much: 0 - not at all      4   Feeling tired or having little energy: 1 - several days      5  Poor appetite or overeatin - not at all      6  Feeling bad about yourself - or that you are a failure or have let yourself or your family down: 0 - not at all      7  Trouble concentrating on things, such as reading the newspaper or watching television: 0 - not at all      8  Moving or speaking so slowly that other people could have noticed  Or the opposite - being so fidgety or restless that you have been moving around a lot more than usual: 0 - not at all      9  Thoughts that you would be better off dead, or of hurting yourself in some way: 0 - not at all  PHQ-2 Score: 1  PHQ-9 Score: 2    Broken Bones/Falls: Fall Risk Assessment:    In the past year, patient has experienced: No history of falling in past year          Bladder/Bowel:  Patient has not leaked urine accidently in the last six months  Patient reports no loss of bowel control  Immunizations:  Patient has had a flu vaccination within the last year  Patient has received a pneumonia shot  Patient has not received a shingles shot  Patient has received tetanus/diphtheria shot  Date of tetanus/diphtheria shot: 2008    Home Safety:  Patient does not have trouble with stairs inside or outside of their home  Patient currently reports that there are no safety hazards present in home, working smoke alarms, working carbon monoxide detectors  Preventative Screenings:   No breast cancer screening performed, colon cancer screen completed, cholesterol screen completed, glaucoma eye exam completed,     Nutrition:  Current diet: Regular with servings of the following:    Medications:  Patient is currently taking over-the-counter supplements  List of OTC medications includes: vit d, multivitamin, vit b 6  Patient is able to manage medications  Lifestyle Choices:  Patient reports no tobacco use  Patient has not smoked or used tobacco in the past   Patient reports no alcohol use  Patient does not drive a vehicle    Patient wears seat belt  Current level of exercise of physical activity described by patient as: exercises on bed, walks in house  Activities of Daily Living:  Can get out of bed by his or her self, able to dress self, able to make own meals, able to do own shopping, able to bathe self, can do own laundry/housekeeping, unable to manage own money and other related tasks    Previous Hospitalizations:  No hospitalization or ED visit in past 12 months        Advanced Directives:  Patient has decided on a power of   Patient has spoken to designated power of   Patient has completed advanced directive  Preventative Screening/Counseling:      Cardiovascular:      General: Risks and Benefits Discussed and Screening Current      Counseling: Healthy Diet and Healthy Weight          Diabetes:      General: Risks and Benefits Discussed and Screening Current      Counseling: Healthy Diet          Colorectal Cancer:      General: Risks and Benefits Discussed and Screening Current      Counseling: high fiber diet          Breast Cancer:      General: Patient Declines          Cervical Cancer:      General: Screening Not Indicated          Osteoporosis:      General: Risks and Benefits Discussed      Counseling: Calcium and Vitamin D Intake      Due for studies: DXA Axial          AAA:      General: Screening Not Indicated          Glaucoma:      General: Risks and Benefits Discussed and Screening Current          HIV:      General: Screening Not Indicated          Hepatitis C:      General: Screening Not Indicated        Advanced Directives:   Patient has living will for healthcare, has durable POA for healthcare, patient has an advanced directive       Immunizations:      Influenza: Influenza UTD This Year and Risks & Benefits Discussed      Pneumococcal: Risks & Benefits Discussed and Lifetime Vaccine Completed      Shingrix: Vaccine Status Unknown      Hepatitis B (Low risk patients): Series Not Indicated and Prevention Counseling      Zostavax: Vaccine Status Unknown      TD: Td Vaccine UTD      TDAP: Vaccine Status Unknown      Other Preventative Counseling (Non-Medicare):  Alcohol Use, Fall Prevention, Increase physical activity, Nutrition Counseling and Skin self-exam      Referrals:  Referral(s) to: Orthopedics

## 2019-03-26 NOTE — PROGRESS NOTES
Assessment/Plan:    1  Right ankle wound  Patient was recently seen by her orthopedic surgeon and he started her on Bactrim antibiotic  I also advise patient and her daughter to be followed by wound care clinic  In the past she was seen at [facility]  Daughter will make the appointments  2  Essential hypertension  Blood pressure is well controlled on present regimen    3  Hypothyroidism  Continue with present dose of levothyroxine  Will check TSH before next visit     Diagnoses and all orders for this visit:    Venous (peripheral) insufficiency    Skin ulcer of right ankle, limited to breakdown of skin (HCC)  -     XR ankle 3+ vw right; Future    Gastroesophageal reflux disease without esophagitis    Hypothyroidism, unspecified type  -     TSH, 3rd generation with Free T4 reflex; Future  -     Basic metabolic panel; Future    Essential hypertension  -     CBC; Future  -     Basic metabolic panel; Future    Medicare annual wellness visit, subsequent          Subjective:          Patient ID: Andrea Reaves is a 80 y o  female  Patient complaining of also over right ankle  Recently she was seen by her orthopedic surgeon and he started her on Bactrim for possible cellulitis  But now there is breakdown of skin and venous congestion  The following portions of the patient's history were reviewed and updated as appropriate: allergies, current medications, past family history, past medical history, past social history, past surgical history and problem list     Review of Systems   Constitutional: Negative for fatigue and fever  HENT: Negative for congestion, ear discharge, ear pain, postnasal drip, sinus pressure, sore throat, tinnitus and trouble swallowing  Eyes: Negative for discharge, itching and visual disturbance  Respiratory: Negative for cough and shortness of breath  Cardiovascular: Negative for chest pain and palpitations     Gastrointestinal: Negative for abdominal pain, diarrhea, nausea and vomiting  Endocrine: Negative for cold intolerance and polyuria  Genitourinary: Negative for difficulty urinating, dysuria and urgency  Musculoskeletal: Negative for arthralgias and neck pain  Skin: Positive for rash and wound  Allergic/Immunologic: Negative for environmental allergies  Neurological: Negative for dizziness, weakness and headaches  Psychiatric/Behavioral: The patient is not nervous/anxious            Past Medical History:   Diagnosis Date    Allergic     Anemia     Arthritis     Cancer ([place] [address] )     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[place] [address] )     Disease of thyroid gland     Fatigue     last assessed 8/5/15; resolved 9/30/16    Gastric cancer ([place] [address] ) 2013    GERD (gastroesophageal reflux disease)     Hyperlipidemia     Hypertension     Osteoporosis     Senile cataract     last assessed 10/18/13; resolved 2/6/15    Situational depression     last assessed 2/11/15; resolved 7/20/15    Visual impairment          Current Outpatient Medications:     acetaminophen (TYLENOL) 500 mg tablet, Take 1 tablet by mouth daily  , Disp: , Rfl:     albuterol (PROVENTIL HFA,VENTOLIN HFA) 90 mcg/act inhaler, Inhale 2 puffs every 6 (six) hours as needed for wheezing, Disp: 1 Inhaler, Rfl: 0    amLODIPine (NORVASC) 5 mg tablet, Take 1 tablet (5 mg total) by mouth daily, Disp: 90 tablet, Rfl: 2    ammonium lactate (AMLACTIN) 12 % lotion, Apply topically as needed  , Disp: , Rfl:     Cholecalciferol (VITAMIN D3) 1000 units CAPS, Take 1 tablet by mouth daily, Disp: , Rfl:     ciprofloxacin (CILOXAN) 0 3 % ophthalmic solution, Administer 1 drop into the left eye every other day  , Disp: , Rfl:     furosemide (LASIX) 40 mg tablet, Take 1 tablet (40 mg total) by mouth daily, Disp: 90 tablet, Rfl: 1    Ganciclovir (ZIRGAN) 0 15 % GEL, Apply 1 Dose to eye 3 (three) times a week  , Disp: , Rfl:     levothyroxine 25 mcg tablet, Take 1 tablet (25 mcg total) by mouth daily, Disp: 90 tablet, Rfl: 1    losartan (COZAAR) 100 MG tablet, Take 1 tablet (100 mg total) by mouth daily, Disp: 90 tablet, Rfl: 1    metoprolol tartrate (LOPRESSOR) 100 mg tablet, Take 1 tablet (100 mg total) by mouth every 12 (twelve) hours, Disp: 180 tablet, Rfl: 2    Multiple Vitamins-Minerals (CENTRUM SILVER 50+WOMEN PO), Take 1 tablet by mouth daily, Disp: , Rfl:     omeprazole (PriLOSEC) 40 MG capsule, Take 1 capsule (40 mg total) by mouth daily, Disp: 90 capsule, Rfl: 1    polyethylene glycol-propylene glycol (SYSTANE) 0 4-0 3 %, Administer 2 drops to both eyes as needed  , Disp: , Rfl:     potassium chloride (K-DUR,KLOR-CON) 10 mEq tablet, Take 1 tablet (10 mEq total) by mouth daily, Disp: 90 tablet, Rfl: 1    prednisoLONE acetate (PRED FORTE) 1 % ophthalmic suspension, Administer 1 drop into the left eye every other day  , Disp: , Rfl:     pyridoxine (VITAMIN B6) 100 mg tablet, Take 100 mg by mouth daily  , Disp: , Rfl:     Allergies   Allergen Reactions    Oxycodone Hallucinations and Nausea Only    Oxycodone-Acetaminophen Hallucinations and Nausea Only    Pollen Extract     Latex Rash       Social History   Past Surgical History:   Procedure Laterality Date    COLONOSCOPY      DENTAL SURGERY      EYE SURGERY      GASTRIC RESTRICTION SURGERY      for cancer    JOINT REPLACEMENT      KNEE SURGERY Right 01/2017    TKR    LYMPH NODE BIOPSY      STOMACH SURGERY       Family History   Problem Relation Age of Onset    Heart attack Mother     Diabetes Mother     Heart disease Mother     Prostate cancer Father        Objective:  /60 (BP Location: Left arm, Patient Position: Sitting, Cuff Size: Standard)   Pulse 62   Temp (!) 96 9 °F (36 1 °C) (Tympanic)   Wt 63 2 kg (139 lb 6 4 oz) Comment: shoes on  SpO2 98%   BMI 26 34 kg/m²   Body mass index is 26 34 kg/m²  Physical Exam   Constitutional: She appears well-developed  HENT:   Head: Normocephalic     Mouth/Throat: Oropharynx is clear and moist    Eyes: Pupils are equal, round, and reactive to light  No scleral icterus  Neck: Normal range of motion  Neck supple  No tracheal deviation present  No thyromegaly present  Cardiovascular: Normal rate and regular rhythm  Murmur heard  Pulmonary/Chest: Effort normal and breath sounds normal  No respiratory distress  She exhibits no tenderness  Abdominal: Soft  Bowel sounds are normal  She exhibits no mass  There is no tenderness  Musculoskeletal: Normal range of motion  Lymphadenopathy:     She has no cervical adenopathy  Neurological: She is alert  No cranial nerve deficit  Skin: Skin is warm  Rash noted  On medial salt of right ankle superficial ulcer noted with the slight breakdown of skin  Positive edema  No limitation of ankle noted  Varicose veins noted  Psychiatric: She has a normal mood and affect

## 2019-04-18 DIAGNOSIS — Z85.028 HISTORY OF GASTRIC CANCER: ICD-10-CM

## 2019-04-18 RX ORDER — OMEPRAZOLE 40 MG/1
CAPSULE, DELAYED RELEASE ORAL
Qty: 90 CAPSULE | Refills: 1 | OUTPATIENT
Start: 2019-04-18

## 2019-04-25 DIAGNOSIS — Z85.028 HISTORY OF GASTRIC CANCER: ICD-10-CM

## 2019-04-25 RX ORDER — OMEPRAZOLE 40 MG/1
40 CAPSULE, DELAYED RELEASE ORAL DAILY
Qty: 90 CAPSULE | Refills: 1 | Status: SHIPPED | OUTPATIENT
Start: 2019-04-25 | End: 2019-12-16 | Stop reason: SDUPTHER

## 2019-05-31 ENCOUNTER — CLINICAL SUPPORT (OUTPATIENT)
Dept: INTERNAL MEDICINE CLINIC | Facility: CLINIC | Age: 84
End: 2019-05-31
Payer: COMMERCIAL

## 2019-05-31 DIAGNOSIS — E03.9 HYPOTHYROIDISM, UNSPECIFIED TYPE: Primary | ICD-10-CM

## 2019-05-31 DIAGNOSIS — I10 ESSENTIAL HYPERTENSION: ICD-10-CM

## 2019-05-31 LAB
ANION GAP SERPL CALCULATED.3IONS-SCNC: 6 MMOL/L (ref 4–13)
BUN SERPL-MCNC: 15 MG/DL (ref 5–25)
CALCIUM SERPL-MCNC: 8.9 MG/DL (ref 8.3–10.1)
CHLORIDE SERPL-SCNC: 96 MMOL/L (ref 100–108)
CO2 SERPL-SCNC: 28 MMOL/L (ref 21–32)
CREAT SERPL-MCNC: 0.71 MG/DL (ref 0.6–1.3)
ERYTHROCYTE [DISTWIDTH] IN BLOOD BY AUTOMATED COUNT: 12.9 % (ref 11.6–15.1)
GFR SERPL CREATININE-BSD FRML MDRD: 76 ML/MIN/1.73SQ M
GLUCOSE P FAST SERPL-MCNC: 91 MG/DL (ref 65–99)
HCT VFR BLD AUTO: 36.6 % (ref 34.8–46.1)
HGB BLD-MCNC: 11.8 G/DL (ref 11.5–15.4)
MCH RBC QN AUTO: 30 PG (ref 26.8–34.3)
MCHC RBC AUTO-ENTMCNC: 32.2 G/DL (ref 31.4–37.4)
MCV RBC AUTO: 93 FL (ref 82–98)
PLATELET # BLD AUTO: 211 THOUSANDS/UL (ref 149–390)
PMV BLD AUTO: 9.4 FL (ref 8.9–12.7)
POTASSIUM SERPL-SCNC: 4.5 MMOL/L (ref 3.5–5.3)
RBC # BLD AUTO: 3.93 MILLION/UL (ref 3.81–5.12)
SODIUM SERPL-SCNC: 130 MMOL/L (ref 136–145)
TSH SERPL DL<=0.05 MIU/L-ACNC: 1.66 UIU/ML (ref 0.36–3.74)
WBC # BLD AUTO: 5.78 THOUSAND/UL (ref 4.31–10.16)

## 2019-05-31 PROCEDURE — 85027 COMPLETE CBC AUTOMATED: CPT | Performed by: INTERNAL MEDICINE

## 2019-05-31 PROCEDURE — 80048 BASIC METABOLIC PNL TOTAL CA: CPT | Performed by: INTERNAL MEDICINE

## 2019-05-31 PROCEDURE — 84443 ASSAY THYROID STIM HORMONE: CPT | Performed by: INTERNAL MEDICINE

## 2019-05-31 PROCEDURE — 36415 COLL VENOUS BLD VENIPUNCTURE: CPT

## 2019-06-03 ENCOUNTER — OFFICE VISIT (OUTPATIENT)
Dept: INTERNAL MEDICINE CLINIC | Facility: CLINIC | Age: 84
End: 2019-06-03
Payer: COMMERCIAL

## 2019-06-03 VITALS
HEART RATE: 53 BPM | OXYGEN SATURATION: 97 % | HEIGHT: 60 IN | TEMPERATURE: 97.7 F | BODY MASS INDEX: 27.25 KG/M2 | DIASTOLIC BLOOD PRESSURE: 70 MMHG | WEIGHT: 138.8 LBS | SYSTOLIC BLOOD PRESSURE: 122 MMHG

## 2019-06-03 DIAGNOSIS — E03.9 HYPOTHYROIDISM, UNSPECIFIED TYPE: ICD-10-CM

## 2019-06-03 DIAGNOSIS — E87.1 HYPONATREMIA: ICD-10-CM

## 2019-06-03 DIAGNOSIS — R26.2 AMBULATORY DYSFUNCTION: ICD-10-CM

## 2019-06-03 DIAGNOSIS — K21.9 GASTROESOPHAGEAL REFLUX DISEASE WITHOUT ESOPHAGITIS: ICD-10-CM

## 2019-06-03 DIAGNOSIS — Z23 NEED FOR TDAP VACCINATION: Primary | ICD-10-CM

## 2019-06-03 PROCEDURE — 1160F RVW MEDS BY RX/DR IN RCRD: CPT | Performed by: INTERNAL MEDICINE

## 2019-06-03 PROCEDURE — 1036F TOBACCO NON-USER: CPT | Performed by: INTERNAL MEDICINE

## 2019-06-03 PROCEDURE — 99214 OFFICE O/P EST MOD 30 MIN: CPT | Performed by: INTERNAL MEDICINE

## 2019-06-10 DIAGNOSIS — I10 ESSENTIAL HYPERTENSION: ICD-10-CM

## 2019-06-10 RX ORDER — FUROSEMIDE 40 MG/1
40 TABLET ORAL DAILY
Qty: 90 TABLET | Refills: 1 | Status: SHIPPED | OUTPATIENT
Start: 2019-06-10 | End: 2020-02-18 | Stop reason: SDUPTHER

## 2019-07-03 DIAGNOSIS — I10 ESSENTIAL HYPERTENSION: ICD-10-CM

## 2019-07-03 RX ORDER — LOSARTAN POTASSIUM 100 MG/1
TABLET ORAL
Qty: 90 TABLET | Refills: 1 | Status: SHIPPED | OUTPATIENT
Start: 2019-07-03 | End: 2019-07-09 | Stop reason: SDUPTHER

## 2019-07-09 DIAGNOSIS — I10 ESSENTIAL HYPERTENSION: ICD-10-CM

## 2019-07-09 RX ORDER — LOSARTAN POTASSIUM 100 MG/1
100 TABLET ORAL DAILY
Qty: 90 TABLET | Refills: 1 | Status: SHIPPED | OUTPATIENT
Start: 2019-07-09 | End: 2019-09-25

## 2019-07-23 ENCOUNTER — OFFICE VISIT (OUTPATIENT)
Dept: INTERNAL MEDICINE CLINIC | Facility: CLINIC | Age: 84
End: 2019-07-23
Payer: COMMERCIAL

## 2019-07-23 VITALS
DIASTOLIC BLOOD PRESSURE: 80 MMHG | SYSTOLIC BLOOD PRESSURE: 142 MMHG | TEMPERATURE: 98.3 F | HEART RATE: 55 BPM | OXYGEN SATURATION: 98 %

## 2019-07-23 DIAGNOSIS — L30.9 DERMATITIS: Primary | ICD-10-CM

## 2019-07-23 DIAGNOSIS — E78.2 MIXED HYPERLIPIDEMIA: ICD-10-CM

## 2019-07-23 DIAGNOSIS — I10 ESSENTIAL HYPERTENSION: ICD-10-CM

## 2019-07-23 DIAGNOSIS — R60.0 BILATERAL EDEMA OF LOWER EXTREMITY: ICD-10-CM

## 2019-07-23 PROCEDURE — 99214 OFFICE O/P EST MOD 30 MIN: CPT | Performed by: INTERNAL MEDICINE

## 2019-07-23 RX ORDER — AMLODIPINE BESYLATE 5 MG/1
TABLET ORAL
Qty: 90 TABLET | Refills: 2 | OUTPATIENT
Start: 2019-07-23

## 2019-07-23 RX ORDER — METOPROLOL TARTRATE 100 MG/1
TABLET ORAL
Qty: 180 TABLET | Refills: 2 | OUTPATIENT
Start: 2019-07-23

## 2019-07-23 RX ORDER — PREDNISONE 20 MG/1
20 TABLET ORAL DAILY
Qty: 40 TABLET | Refills: 0 | Status: SHIPPED | OUTPATIENT
Start: 2019-07-23 | End: 2019-09-25

## 2019-07-23 RX ORDER — FLUTICASONE PROPIONATE 0.05 %
CREAM (GRAM) TOPICAL 2 TIMES DAILY
Qty: 30 G | Refills: 1 | Status: SHIPPED | OUTPATIENT
Start: 2019-07-23 | End: 2019-09-25

## 2019-07-23 NOTE — PROGRESS NOTES
Assessment/Plan:    1  Dermatitis most likely allergic  Will start patient on prednisone 20 mg b i d  For 5 days then 20 mg daily for 2 weeks  Also advised to take over-the-counter Claritin or Allegra once a day  Also advised to stop any new medicine so detergent at [employer]  2  Essential hypertension  Blood pressure is acceptable  Will continue with present regimen  Diagnoses and all orders for this visit:    Dermatitis  -     predniSONE 20 mg tablet; Take 1 tablet (20 mg total) by mouth daily Take 20 mg b i d  For 5 days then 20 mg daily  -     fluticasone (CUTIVATE) 0 05 % cream; Apply topically 2 (two) times a day    Essential hypertension    Mixed hyperlipidemia    Other orders  -     Multiple Vitamins-Minerals (PRESERVISION AREDS PO); Take by mouth daily          Subjective:          Patient ID: Rell Arteaga is a 80 y o  female  Patient came to the office with a complain of a generalized rash which started about 2 weeks ago  She denied any new so, shampoo 0 or any new count cream   Only new medicine she she started pure vision   She did stop it about a week ago      The following portions of the patient's history were reviewed and updated as appropriate: allergies, current medications, past family history, past medical history, past social history, past surgical history and problem list     Review of Systems   Constitutional: Negative for fatigue and fever  HENT: Negative for congestion, ear discharge, ear pain, postnasal drip, sinus pressure, sore throat, tinnitus and trouble swallowing  Eyes: Negative for discharge, itching and visual disturbance  Respiratory: Negative for cough and shortness of breath  Cardiovascular: Negative for chest pain and palpitations  Gastrointestinal: Negative for abdominal pain, diarrhea, nausea and vomiting  Endocrine: Negative for cold intolerance and polyuria  Genitourinary: Negative for difficulty urinating, dysuria and urgency  Musculoskeletal: Negative for arthralgias and neck pain  Skin: Positive for rash  Allergic/Immunologic: Negative for environmental allergies  Neurological: Negative for dizziness, weakness and headaches  Psychiatric/Behavioral: The patient is not nervous/anxious            Past Medical History:   Diagnosis Date    Allergic     Anemia     Arthritis     Cancer ([facility] )     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[facility] )     Disease of thyroid gland     Fatigue     last assessed 8/5/15; resolved 9/30/16    Gastric cancer ([place] [address] ) 2013    GERD (gastroesophageal reflux disease)     Hyperlipidemia     Hypertension     Osteoporosis     Senile cataract     last assessed 10/18/13; resolved 2/6/15    Situational depression     last assessed 2/11/15; resolved 7/20/15    Visual impairment          Current Outpatient Medications:     acetaminophen (TYLENOL) 500 mg tablet, Take 1 tablet by mouth daily  , Disp: , Rfl:     albuterol (PROVENTIL HFA,VENTOLIN HFA) 90 mcg/act inhaler, Inhale 2 puffs every 6 (six) hours as needed for wheezing, Disp: 1 Inhaler, Rfl: 0    amLODIPine (NORVASC) 5 mg tablet, Take 1 tablet (5 mg total) by mouth daily, Disp: 90 tablet, Rfl: 2    Cholecalciferol (VITAMIN D3) 1000 units CAPS, Take 1 tablet by mouth daily, Disp: , Rfl:     ciprofloxacin (CILOXAN) 0 3 % ophthalmic solution, Administer 1 drop into the left eye every other day  , Disp: , Rfl:     furosemide (LASIX) 40 mg tablet, Take 1 tablet (40 mg total) by mouth daily, Disp: 90 tablet, Rfl: 1    Ganciclovir (ZIRGAN) 0 15 % GEL, Apply 1 Dose to eye 3 (three) times a week  , Disp: , Rfl:     levothyroxine 25 mcg tablet, Take 1 tablet (25 mcg total) by mouth daily, Disp: 90 tablet, Rfl: 1    losartan (COZAAR) 100 MG tablet, Take 1 tablet (100 mg total) by mouth daily, Disp: 90 tablet, Rfl: 1    metoprolol tartrate (LOPRESSOR) 100 mg tablet, Take 1 tablet (100 mg total) by mouth every 12 (twelve) hours, Disp: 180 tablet, Rfl: 2    Multiple Vitamins-Minerals (CENTRUM SILVER 50+WOMEN PO), Take 1 tablet by mouth daily, Disp: , Rfl:     Multiple Vitamins-Minerals (PRESERVISION AREDS PO), Take by mouth daily, Disp: , Rfl:     omeprazole (PriLOSEC) 40 MG capsule, Take 1 capsule (40 mg total) by mouth daily, Disp: 90 capsule, Rfl: 1    polyethylene glycol-propylene glycol (SYSTANE) 0 4-0 3 %, Administer 2 drops to both eyes as needed  , Disp: , Rfl:     potassium chloride (K-DUR,KLOR-CON) 10 mEq tablet, Take 1 tablet (10 mEq total) by mouth daily, Disp: 90 tablet, Rfl: 1    prednisoLONE acetate (PRED FORTE) 1 % ophthalmic suspension, Administer 1 drop into the left eye every other day  , Disp: , Rfl:     pyridoxine (VITAMIN B6) 100 mg tablet, Take 100 mg by mouth daily  , Disp: , Rfl:     fluticasone (CUTIVATE) 0 05 % cream, Apply topically 2 (two) times a day, Disp: 30 g, Rfl: 1    predniSONE 20 mg tablet, Take 1 tablet (20 mg total) by mouth daily Take 20 mg b i d  For 5 days then 20 mg daily  , Disp: 40 tablet, Rfl: 0    Allergies   Allergen Reactions    Latex Rash    Oxycodone Nausea Only and Hallucinations    Oxycodone-Acetaminophen Nausea Only and Hallucinations    Pollen Extract Allergic Rhinitis       Social History   Past Surgical History:   Procedure Laterality Date    COLONOSCOPY      DENTAL SURGERY      EYE SURGERY      GASTRIC RESTRICTION SURGERY      for cancer    JOINT REPLACEMENT      KNEE SURGERY Right 01/2017    TKR    LYMPH NODE BIOPSY      STOMACH SURGERY       Family History   Problem Relation Age of Onset    Heart attack Mother     Diabetes Mother     Heart disease Mother     Prostate cancer Father        Objective:  /80 (BP Location: Left arm, Patient Position: Sitting, Cuff Size: Adult)   Pulse 55   Temp 98 3 °F (36 8 °C) (Oral)   SpO2 98% Comment: ra  There is no height or weight on file to calculate BMI  Physical Exam   Constitutional: She appears well-developed  HENT:   Head: Normocephalic  Mouth/Throat: Oropharynx is clear and moist    Eyes: Pupils are equal, round, and reactive to light  No scleral icterus  Neck: Normal range of motion  Neck supple  No tracheal deviation present  No thyromegaly present  Cardiovascular: Normal rate, regular rhythm and normal heart sounds  Pulmonary/Chest: Effort normal and breath sounds normal  No respiratory distress  She exhibits no tenderness  Abdominal: Soft  Bowel sounds are normal  She exhibits no mass  There is no tenderness  Musculoskeletal: Normal range of motion  Lymphadenopathy:     She has no cervical adenopathy  Neurological: She is alert  No cranial nerve deficit  Skin: Skin is warm  Rash noted  Multiple area of hyperemic rash with some scab formation over neck upper extremity and both hands noted  Psychiatric: She has a normal mood and affect

## 2019-07-24 ENCOUNTER — OFFICE VISIT (OUTPATIENT)
Dept: URGENT CARE | Age: 84
End: 2019-07-24
Payer: COMMERCIAL

## 2019-07-24 VITALS
BODY MASS INDEX: 25.86 KG/M2 | HEIGHT: 61 IN | WEIGHT: 137 LBS | RESPIRATION RATE: 16 BRPM | DIASTOLIC BLOOD PRESSURE: 60 MMHG | TEMPERATURE: 96.6 F | OXYGEN SATURATION: 98 % | SYSTOLIC BLOOD PRESSURE: 184 MMHG

## 2019-07-24 DIAGNOSIS — R68.89 MULTIPLE COMPLAINTS: Primary | ICD-10-CM

## 2019-07-24 PROCEDURE — 99203 OFFICE O/P NEW LOW 30 MIN: CPT | Performed by: NURSE PRACTITIONER

## 2019-07-24 RX ORDER — POTASSIUM CHLORIDE 750 MG/1
TABLET, EXTENDED RELEASE ORAL
Qty: 90 TABLET | Refills: 1 | OUTPATIENT
Start: 2019-07-24

## 2019-07-24 NOTE — PROGRESS NOTES
NAME: Peace Lujan is a 80 y o  female  : 10/9/1930    MRN: [id number]      Assessment and Plan   Multiple complaints [R68 89]  1  Multiple complaints  Transfer to other facility       [facility] was seen today for fall  Diagnoses and all orders for this visit:    Multiple complaints  -     Transfer to other facility     [facility]  ER aware    Patient Instructions   Patient Instructions   Patient was referred to [facility] or per patient request to mid lumbar  Verbalized understanding of the need for further evaluation  Ambulance call for patient to go to the ER due to extreme pain     Proceed to ER if symptoms worsen  Chief Complaint     Chief Complaint   Patient presents with    Fall     Pt fell at the [facility]  Pt reports she lost her balance and fell on her left side  Pt c/o pain to "the whole arm" and "the whoel leg" on left side of body  History of Present Illness     Pt here today with multiple complaints after a fall coming out of the [employer] and landing on the left side of the body  She hit head after walking and losing her balance injurying the left side of head, shoulder, arm, elbow, hip and left leg  She is in pain and upset  Patient's daughter is at bedside  Patient is not on any blood thinners and denies having headache or blurred vision  Patient has pain with range of motion of the left shoulder and complains of left hip and leg pain and inability to stand  Denies having numbness and tingling to the left upper and lower extremity  There exam of patient's left hip difficulty to assess due to patient's pain  Patient was assisted into the daughter's car and arrived to the office  Due to the multiple injuries discussed with patient and her daughter for further evaluation in the ER and family and patient agreed  Daughter feels comfortable to take her to the ER via her car    ER was made aware patient will be EN route  Patient great difficulty getting from wheelchair into vehicle and ambulance was called to assist patient to the ER  Review of Systems   Review of Systems   Constitutional: Negative  Respiratory: Negative  Cardiovascular: Negative  Musculoskeletal: Positive for arthralgias ( left shoulder and elbow pain), back pain ( low back pain), gait problem, joint swelling (Left shoulder left elbow and left hip), myalgias ( left thigh) and neck pain ( left side denies cervical spine tenderness)  Negative for neck stiffness     Skin:        No obvious abrasion or open wounds the left side of the head shoulder elbow or hip at this time         Current Medications       Current Outpatient Medications:     acetaminophen (TYLENOL) 500 mg tablet, Take 1 tablet by mouth daily  , Disp: , Rfl:     albuterol (PROVENTIL HFA,VENTOLIN HFA) 90 mcg/act inhaler, Inhale 2 puffs every 6 (six) hours as needed for wheezing, Disp: 1 Inhaler, Rfl: 0    amLODIPine (NORVASC) 5 mg tablet, Take 1 tablet (5 mg total) by mouth daily, Disp: 90 tablet, Rfl: 2    Cholecalciferol (VITAMIN D3) 1000 units CAPS, Take 1 tablet by mouth daily, Disp: , Rfl:     ciprofloxacin (CILOXAN) 0 3 % ophthalmic solution, Administer 1 drop into the left eye every other day  , Disp: , Rfl:     fluticasone (CUTIVATE) 0 05 % cream, Apply topically 2 (two) times a day, Disp: 30 g, Rfl: 1    furosemide (LASIX) 40 mg tablet, Take 1 tablet (40 mg total) by mouth daily, Disp: 90 tablet, Rfl: 1    Ganciclovir (ZIRGAN) 0 15 % GEL, Apply 1 Dose to eye 3 (three) times a week  , Disp: , Rfl:     levothyroxine 25 mcg tablet, Take 1 tablet (25 mcg total) by mouth daily, Disp: 90 tablet, Rfl: 1    losartan (COZAAR) 100 MG tablet, Take 1 tablet (100 mg total) by mouth daily, Disp: 90 tablet, Rfl: 1    metoprolol tartrate (LOPRESSOR) 100 mg tablet, Take 1 tablet (100 mg total) by mouth every 12 (twelve) hours, Disp: 180 tablet, Rfl: 2    Multiple Vitamins-Minerals (CENTRUM SILVER 50+WOMEN PO), Take 1 tablet by mouth daily, Disp: , Rfl:     Multiple Vitamins-Minerals (PRESERVISION AREDS PO), Take by mouth daily, Disp: , Rfl:     omeprazole (PriLOSEC) 40 MG capsule, Take 1 capsule (40 mg total) by mouth daily, Disp: 90 capsule, Rfl: 1    polyethylene glycol-propylene glycol (SYSTANE) 0 4-0 3 %, Administer 2 drops to both eyes as needed  , Disp: , Rfl:     potassium chloride (K-DUR,KLOR-CON) 10 mEq tablet, Take 1 tablet (10 mEq total) by mouth daily, Disp: 90 tablet, Rfl: 1    prednisoLONE acetate (PRED FORTE) 1 % ophthalmic suspension, Administer 1 drop into the left eye every other day  , Disp: , Rfl:     predniSONE 20 mg tablet, Take 1 tablet (20 mg total) by mouth daily Take 20 mg b i d  For 5 days then 20 mg daily  , Disp: 40 tablet, Rfl: 0    pyridoxine (VITAMIN B6) 100 mg tablet, Take 100 mg by mouth daily  , Disp: , Rfl:     Current Allergies     Allergies as of 07/24/2019 - Reviewed 07/24/2019   Allergen Reaction Noted    Latex Rash 11/17/2017    Oxycodone Nausea Only and Hallucinations 05/11/2017    Oxycodone-acetaminophen Nausea Only and Hallucinations 05/11/2017    Pollen extract Allergic Rhinitis 07/21/2015              Past Medical History:   Diagnosis Date    Allergic     Anemia     Arthritis     [facility]     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     Disease of thyroid gland     Fatigue     last assessed 8/5/15; resolved 9/30/16    Gastric cancer ([facility] ) 2013    GERD (gastroesophageal reflux disease)     Hyperlipidemia     Hypertension     Osteoporosis     Senile cataract     last assessed 10/18/13; resolved 2/6/15    Situational depression     last assessed 2/11/15; resolved 7/20/15    Visual impairment        Past Surgical History:   Procedure Laterality Date    COLONOSCOPY      DENTAL SURGERY      EYE SURGERY      GASTRIC RESTRICTION SURGERY      for cancer    JOINT REPLACEMENT      KNEE SURGERY Right 01/2017    TKR    LYMPH NODE BIOPSY      STOMACH SURGERY         Family History   Problem Relation Age of Onset    Heart attack Mother     Diabetes Mother     Heart disease Mother     Prostate cancer Father          Medications have been verified  The following portions of the patient's history were reviewed and updated as appropriate: allergies, current medications, past family history, past medical history, past social history, past surgical history and problem list     Objective   BP (!) 184/60   Temp (!) 96 6 °F (35 9 °C) (Tympanic)   Resp 16   Ht 5' 1" (1 549 m)   Wt 62 1 kg (137 lb)   SpO2 98%   BMI 25 89 kg/m²      Physical Exam     Physical Exam   Constitutional: She appears well-developed  She is cooperative  She appears distressed  Patient is hard of hearing and denies having headache at this time  She has a lot of distress and acute pain and BP is elevated  She does not take any blood thinners   HENT:   Head:       Neck: Muscular tenderness (Left side) present  Denies tenderness over palpation of the cervical spine   Cardiovascular: Normal rate and regular rhythm  Pulmonary/Chest: Effort normal and breath sounds normal            Abdominal:       Musculoskeletal:        Left shoulder: She exhibits decreased range of motion, tenderness, pain and decreased strength  She exhibits no deformity  Left elbow: She exhibits decreased range of motion  She exhibits no swelling, no deformity and no laceration  Tenderness found  Left wrist: Normal         Left hip: She exhibits decreased range of motion, decreased strength and tenderness  She exhibits no swelling  Left knee: She exhibits decreased range of motion  She exhibits no swelling  Tenderness found  Legs:  Patient is sitting in wheelchair slanted to the right and bleeding back due to discomfort and pain  Patient is unable to sit up and moved to stretcher bed and is aware further evaluation is needed in the ER     Neurological: She is alert         LORI Potter

## 2019-07-25 NOTE — PATIENT INSTRUCTIONS
Patient was referred to [facility] or per patient request to mid lumbar  Verbalized understanding of the need for further evaluation  Ambulance call for patient to go to the ER due to extreme pain

## 2019-07-30 ENCOUNTER — TELEPHONE (OUTPATIENT)
Dept: INTERNAL MEDICINE CLINIC | Facility: CLINIC | Age: 84
End: 2019-07-30

## 2019-07-30 NOTE — TELEPHONE ENCOUNTER
Received a call from [facility], the patient liaison at [facility]  She is working with the  at [facility] to arrange for rehab for Philip hernandez  This patient is on an eye gel 3 times/month as needed  The cost of this eye gel is extremely expensive and if [facility] hs to supply this medication, she will not be accepted at that facility for rehab  Addressed this with Dr Karla Yusuf  Letter was written and faxed to [facility] at [phone]

## 2019-09-03 ENCOUNTER — TRANSITIONAL CARE MANAGEMENT (OUTPATIENT)
Dept: INTERNAL MEDICINE CLINIC | Facility: CLINIC | Age: 84
End: 2019-09-03

## 2019-09-11 ENCOUNTER — TELEPHONE (OUTPATIENT)
Dept: INTERNAL MEDICINE CLINIC | Facility: CLINIC | Age: 84
End: 2019-09-11

## 2019-09-19 ENCOUNTER — TRANSITIONAL CARE MANAGEMENT (OUTPATIENT)
Dept: INTERNAL MEDICINE CLINIC | Facility: CLINIC | Age: 84
End: 2019-09-19

## 2019-09-19 RX ORDER — POTASSIUM CHLORIDE 20 MEQ/1
20 TABLET, EXTENDED RELEASE ORAL 2 TIMES DAILY
Refills: 0 | COMMUNITY
Start: 2019-09-18 | End: 2019-10-14 | Stop reason: SDUPTHER

## 2019-09-19 RX ORDER — DIPHENHYDRAMINE HCL 25 MG
25 CAPSULE ORAL
COMMUNITY
Start: 2019-09-13 | End: 2019-09-25 | Stop reason: ALTCHOICE

## 2019-09-19 RX ORDER — CALCIUM CARBONATE 750 MG/1
750 TABLET, CHEWABLE ORAL 3 TIMES DAILY PRN
COMMUNITY

## 2019-09-19 RX ORDER — BISOPROLOL FUMARATE 10 MG/1
10 TABLET ORAL DAILY
Refills: 0 | COMMUNITY
Start: 2019-09-18 | End: 2019-11-04 | Stop reason: SDUPTHER

## 2019-09-19 RX ORDER — DOXAZOSIN MESYLATE 4 MG/1
4 TABLET ORAL 2 TIMES DAILY
Refills: 0 | COMMUNITY
Start: 2019-09-18 | End: 2019-10-14 | Stop reason: SDUPTHER

## 2019-09-19 RX ORDER — CARBOXYMETHYLCELLULOSE SODIUM 5 MG/ML
1 SOLUTION/ DROPS OPHTHALMIC EVERY OTHER DAY
COMMUNITY
Start: 2019-09-13

## 2019-09-19 RX ORDER — LOSARTAN POTASSIUM 25 MG/1
75 TABLET ORAL 2 TIMES DAILY
Refills: 0 | COMMUNITY
Start: 2019-09-18 | End: 2019-10-21 | Stop reason: SDUPTHER

## 2019-09-25 ENCOUNTER — OFFICE VISIT (OUTPATIENT)
Dept: INTERNAL MEDICINE CLINIC | Facility: CLINIC | Age: 84
End: 2019-09-25
Payer: COMMERCIAL

## 2019-09-25 VITALS
DIASTOLIC BLOOD PRESSURE: 60 MMHG | HEART RATE: 58 BPM | BODY MASS INDEX: 25.55 KG/M2 | WEIGHT: 135.2 LBS | TEMPERATURE: 98 F | SYSTOLIC BLOOD PRESSURE: 180 MMHG | OXYGEN SATURATION: 98 %

## 2019-09-25 DIAGNOSIS — I10 ESSENTIAL HYPERTENSION: Primary | ICD-10-CM

## 2019-09-25 DIAGNOSIS — T14.8XXA WOUND INFECTION: ICD-10-CM

## 2019-09-25 DIAGNOSIS — L08.9 WOUND INFECTION: ICD-10-CM

## 2019-09-25 DIAGNOSIS — B36.9 FUNGAL DERMATITIS: ICD-10-CM

## 2019-09-25 DIAGNOSIS — E03.9 HYPOTHYROIDISM, UNSPECIFIED TYPE: ICD-10-CM

## 2019-09-25 DIAGNOSIS — D62 ACUTE BLOOD LOSS ANEMIA: ICD-10-CM

## 2019-09-25 DIAGNOSIS — T84.50XD INFECTION OF PROSTHETIC JOINT, SUBSEQUENT ENCOUNTER: ICD-10-CM

## 2019-09-25 DIAGNOSIS — E87.6 HYPOKALEMIA: ICD-10-CM

## 2019-09-25 PROBLEM — T84.50XA INFECTION OF PROSTHETIC JOINT (HCC): Status: ACTIVE | Noted: 2019-08-23

## 2019-09-25 PROCEDURE — 99496 TRANSJ CARE MGMT HIGH F2F 7D: CPT | Performed by: INTERNAL MEDICINE

## 2019-09-25 RX ORDER — AMLODIPINE BESYLATE 5 MG/1
5 TABLET ORAL DAILY
Qty: 90 TABLET | Refills: 2
Start: 2019-09-25 | End: 2019-11-19 | Stop reason: SDUPTHER

## 2019-09-25 RX ORDER — CLOTRIMAZOLE AND BETAMETHASONE DIPROPIONATE 10; .64 MG/G; MG/G
CREAM TOPICAL 2 TIMES DAILY
Qty: 30 G | Refills: 0 | Status: SHIPPED | OUTPATIENT
Start: 2019-09-25 | End: 2019-10-21 | Stop reason: SDUPTHER

## 2019-09-25 RX ORDER — FERROUS SULFATE 325(65) MG
325 TABLET ORAL 2 TIMES DAILY WITH MEALS
Start: 2019-09-25 | End: 2020-12-01 | Stop reason: SDUPTHER

## 2019-09-25 NOTE — ASSESSMENT & PLAN NOTE
Uncontrolled  Continue with bisoprolol 10 mg daily, Lasix 40 mg daily, losartan 75 mg b i d , and will restart amlodipine 5 mg daily  Follow-up in 2 weeks  Will request home visiting nurse to contact our office and give us updates of blood pressure readings  Continue with potassium supplementation

## 2019-09-25 NOTE — PROGRESS NOTES
Assessment/Plan:    Hypothyroidism  Continue levothyroxine 25 mcg daily  Essential hypertension  Uncontrolled  Continue with bisoprolol 10 mg daily, Lasix 40 mg daily, losartan 75 mg b i d , and will restart amlodipine 5 mg daily  Follow-up in 2 weeks  Will request home visiting nurse to contact our office and give us updates of blood pressure readings  Continue with potassium supplementation  Infection of prosthetic joint ([address] )  Healing well  Continue with IV cefazolin until 10/05/2019 followed by 3 months of cefadroxil therapy  Follow-up with Orthopedic surgery  Hypokalemia  Continue potassium supplementation  Recheck BMP in 2 weeks  Acute blood loss anemia  Restart iron supplementation b i d  Recheck CBC in 1 month  Fungal dermatitis  Will prescribe Lotrisone b i d  Diagnoses and all orders for this visit:    Essential hypertension  -     amLODIPine (NORVASC) 5 mg tablet; Take 1 tablet (5 mg total) by mouth daily    Hypothyroidism, unspecified type    Infection of prosthetic joint, subsequent encounter    Acute blood loss anemia  -     ferrous sulfate (FEROSUL) 325 (65 Fe) mg tablet; Take 1 tablet (325 mg total) by mouth 2 (two) times a day with meals  -     CBC; Future    Fungal dermatitis  -     clotrimazole-betamethasone (LOTRISONE) 1-0 05 % cream; Apply topically 2 (two) times a day    Hypokalemia  -     Basic metabolic panel; Future    Wound infection          Subjective:      Patient ID: Omar Wells is a 80 y o  female  Is seen today for transition of care to which she was hospitalized at [facility] from 08/30/2019 until 09/18/2019 for left hip prosthetic joint infection  She is accompanied by her daughter  Hospitalization documentation and discharge summary reviewed today  Summary of hospitalization:  On 07/25/2019 she underwent left hip hemiarthroplasty and since was having delayed wound healing    She presented with her orthopedic surgery on day of admission and was advised to go to the [facility] Emergency Department for admission for concern of left hip septic joint/wound infection  She was evaluated by Orthopedic surgery and ultimately underwent left hip arthroplasty with irrigation and debridement on 08/24/2019  Infectious Disease was also consulted and advised that she receive 6 weeks of IV cefazolin therapy followed by 3 months of oral cefadroxil therapy  Wound cultures were obtained in 1 out of 4 was positive for MSSA  To discharge to inpatient rehab  Since discharge, she has been doing well and wound and incision site are healing well  Recent laboratory studies show a hemoglobin of 8 3 (likely due to acute blood loss anemia from procedure) and hypokalemia with a potassium of 3 3  Her antihypertensive regimen was adjusted during hospitalization to which she was discharged on bisoprolol 10 mg daily, losartan 75 mg b i d , and Lasix 40 mg daily along with potassium supplementation  She has home visiting nurses and her daughter states that her blood pressure readings have been stable, 204-081 systolic  TCM Call (since 8/25/2019)     Date and time call was made  9/19/2019 11:26 AM    Hospital care reviewed  Records reviewed    Patient was hospitialized at  [facility]; Other (comment)    Comment  inpatient rehab @ [facility]    Date of Admission  08/30/19    Date of discharge  09/18/19    Diagnosis  left hip prosthetic joint infection;     Disposition  Home    Were the patients medications reviewed and updated  Yes    Current Symptoms  Weakness; Fatigue <img src='C:FILES (X86)    Weakness severity  Mild    Fatigue severity  Mild      TCM Call (since 8/25/2019)     Post hospital issues  None    Should patient be enrolled in anticoag monitoring? No    Scheduled for follow up?   Yes    Not clinically warranted   inpatient rehab @ [place]    Patients specialists  Other (comment)    Other specialists names  Peggy Brain Jf CANAS 9/19/19 [facility] orthopedics    Other specialists contcat #      Did you obtain your prescribed medications  Yes    I have advised the patient to call PCP with any new or worsening symptoms  Jane Lezama MA          The following portions of the patient's history were reviewed and updated as appropriate: allergies, current medications, past family history, past medical history, past social history, past surgical history and problem list     Review of Systems   Constitutional: Negative for activity change, appetite change, chills, diaphoresis, fatigue and fever  HENT: Negative for congestion, postnasal drip, rhinorrhea, sinus pressure, sinus pain, sneezing and sore throat  Eyes: Negative for visual disturbance  Respiratory: Negative for apnea, cough, choking, chest tightness, shortness of breath and wheezing  Cardiovascular: Negative for chest pain, palpitations and leg swelling  Gastrointestinal: Negative for abdominal distention, abdominal pain, anal bleeding, blood in stool, constipation, diarrhea, nausea and vomiting  Endocrine: Negative for cold intolerance and heat intolerance  Genitourinary: Negative for difficulty urinating, dysuria and hematuria  Musculoskeletal: Negative  Skin: Negative  Neurological: Negative for dizziness, weakness, light-headedness, numbness and headaches  Hematological: Negative for adenopathy  Psychiatric/Behavioral: Negative for agitation, sleep disturbance and suicidal ideas  All other systems reviewed and are negative          Past Medical History:   Diagnosis Date    Allergic     Anemia     Arthritis     [facility] )     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     Disease of thyroid gland     Fatigue     last assessed 8/5/15; resolved 9/30/16    G[facility] ) 2013    GERD (gastroesophageal reflux disease)     Hyperlipidemia     Hypertension     Osteoporosis     Senile cataract     last assessed 10/18/13; resolved 2/6/15    Situational depression     last assessed 2/11/15; resolved 7/20/15    Visual impairment          Current Outpatient Medications:     acetaminophen (TYLENOL) 500 mg tablet, Take 1 tablet by mouth daily  , Disp: , Rfl:     bisoprolol (ZEBETA) 10 MG tablet, Take 10 mg by mouth daily, Disp: , Rfl: 0    carboxymethylcellulose 0 5 % SOLN, Apply 1 drop to eye 2 (two) times a day as needed, Disp: , Rfl:     Cholecalciferol (VITAMIN D3) 1000 units CAPS, Take 1 tablet by mouth daily, Disp: , Rfl:     ciprofloxacin (CILOXAN) 0 3 % ophthalmic solution, Administer 1 drop into the left eye every other day  , Disp: , Rfl:     furosemide (LASIX) 40 mg tablet, Take 1 tablet (40 mg total) by mouth daily, Disp: 90 tablet, Rfl: 1    levothyroxine 25 mcg tablet, Take 1 tablet (25 mcg total) by mouth daily, Disp: 90 tablet, Rfl: 1    losartan (COZAAR) 25 mg tablet, Take 75 mg by mouth 2 (two) times a day, Disp: , Rfl: 0    Multiple Vitamins-Minerals (CENTRUM SILVER 50+WOMEN PO), Take 1 tablet by mouth daily, Disp: , Rfl:     omeprazole (PriLOSEC) 40 MG capsule, Take 1 capsule (40 mg total) by mouth daily, Disp: 90 capsule, Rfl: 1    prednisoLONE acetate (PRED FORTE) 1 % ophthalmic suspension, Administer 1 drop into the left eye every other day  , Disp: , Rfl:     pyridoxine (VITAMIN B6) 100 mg tablet, Take 100 mg by mouth daily  , Disp: , Rfl:     amLODIPine (NORVASC) 5 mg tablet, Take 1 tablet (5 mg total) by mouth daily, Disp: 90 tablet, Rfl: 2    calcium carbonate (TUMS EX) 750 mg chewable tablet, Chew 750 mg Three times daily as needed, Disp: , Rfl:     ceFAZolin 2 g IVPB, Infuse 2 g into a venous catheter every 8 (eight) hours, Disp: , Rfl:     clotrimazole-betamethasone (LOTRISONE) 1-0 05 % cream, Apply topically 2 (two) times a day, Disp: 30 g, Rfl: 0    doxazosin (CARDURA) 4 mg tablet, Take 4 mg by mouth 2 (two) times a day, Disp: , Rfl: 0    ferrous sulfate (FEROSUL) 325 (65 Fe) mg tablet, Take 1 tablet (325 mg total) by mouth 2 (two) times a day with meals, Disp: , Rfl:     Multiple Vitamins-Minerals (PRESERVISION AREDS PO), Take by mouth daily, Disp: , Rfl:     polyethylene glycol-propylene glycol (SYSTANE) 0 4-0 3 %, Administer 2 drops to both eyes as needed  , Disp: , Rfl:     potassium chloride (K-DUR,KLOR-CON) 20 mEq tablet, Take 20 mEq by mouth 2 (two) times a day, Disp: , Rfl: 0    ropivacaine 0 5% 16mL, morphine (PF) 10 mg/mL 6 mg, EPINEPHrine 1:1000 0 2 mg, methylPRESNISolone acetate 40 mg in sodium chloride (PF) 0 9% 25 mL, 10 mL 2 (two) times a day, Disp: , Rfl:     WHITE PETROLATUM-MINERAL OIL OP, Apply 1 application topically 2 (two) times a day, Disp: , Rfl:     Allergies   Allergen Reactions    Latex Rash    Oxycodone Nausea Only, Hallucinations and Other (See Comments)     Hallucinations    Oxycodone-Acetaminophen Nausea Only and Hallucinations    Pollen Extract Allergic Rhinitis       Social History   Past Surgical History:   Procedure Laterality Date    COLONOSCOPY      DENTAL SURGERY      EYE SURGERY      GASTRIC RESTRICTION SURGERY      for cancer    JOINT REPLACEMENT      KNEE SURGERY Right 01/2017    TKR    LYMPH NODE BIOPSY      STOMACH SURGERY       Family History   Problem Relation Age of Onset    Heart attack Mother     Diabetes Mother     Heart disease Mother     Prostate cancer Father        Objective:  BP (!) 180/60 (BP Location: Left arm, Patient Position: Sitting, Cuff Size: Standard)   Pulse 58   Temp 98 °F (36 7 °C) (Oral)   Wt 61 3 kg (135 lb 3 2 oz) Comment: shoes on  SpO2 98%   BMI 25 55 kg/m²     No results found for this or any previous visit (from the past 1344 hour(s))  Physical Exam   Constitutional: She is oriented to person, place, and time  She appears well-developed and well-nourished  No distress  HENT:   Head: Normocephalic and atraumatic  Eyes: Pupils are equal, round, and reactive to light   Conjunctivae and EOM are normal  Right eye exhibits no discharge  Left eye exhibits no discharge  Neck: Normal range of motion  Neck supple  No JVD present  No thyromegaly present  Cardiovascular: Normal rate, regular rhythm and intact distal pulses  Exam reveals no gallop and no friction rub  Murmur heard  Systolic murmur is present with a grade of 2/6  Pulmonary/Chest: Effort normal and breath sounds normal  No respiratory distress  She has no wheezes  She has no rales  She exhibits no tenderness  Abdominal: Soft  She exhibits no distension  There is no tenderness  Musculoskeletal: Normal range of motion  She exhibits no edema, tenderness or deformity  Lymphadenopathy:     She has no cervical adenopathy  Neurological: She is alert and oriented to person, place, and time  No cranial nerve deficit  Coordination normal    Skin: Skin is warm and dry  No rash noted  She is not diaphoretic  No erythema  No pallor  Psychiatric: She has a normal mood and affect  Her behavior is normal  Judgment and thought content normal    Nursing note and vitals reviewed

## 2019-09-25 NOTE — ASSESSMENT & PLAN NOTE
Healing well  Continue with IV cefazolin until 10/05/2019 followed by 3 months of cefadroxil therapy  Follow-up with Orthopedic surgery

## 2019-10-04 ENCOUNTER — CLINICAL SUPPORT (OUTPATIENT)
Dept: INTERNAL MEDICINE CLINIC | Facility: CLINIC | Age: 84
End: 2019-10-04
Payer: COMMERCIAL

## 2019-10-04 DIAGNOSIS — D62 ACUTE BLOOD LOSS ANEMIA: Primary | ICD-10-CM

## 2019-10-04 DIAGNOSIS — E87.6 HYPOKALEMIA: ICD-10-CM

## 2019-10-04 LAB
ANION GAP SERPL CALCULATED.3IONS-SCNC: 6 MMOL/L (ref 4–13)
BUN SERPL-MCNC: 11 MG/DL (ref 5–25)
CALCIUM SERPL-MCNC: 9.5 MG/DL (ref 8.3–10.1)
CHLORIDE SERPL-SCNC: 96 MMOL/L (ref 100–108)
CO2 SERPL-SCNC: 29 MMOL/L (ref 21–32)
CREAT SERPL-MCNC: 0.71 MG/DL (ref 0.6–1.3)
ERYTHROCYTE [DISTWIDTH] IN BLOOD BY AUTOMATED COUNT: 14.4 % (ref 11.6–15.1)
GFR SERPL CREATININE-BSD FRML MDRD: 76 ML/MIN/1.73SQ M
GLUCOSE P FAST SERPL-MCNC: 94 MG/DL (ref 65–99)
HCT VFR BLD AUTO: 28.9 % (ref 34.8–46.1)
HGB BLD-MCNC: 8.9 G/DL (ref 11.5–15.4)
MCH RBC QN AUTO: 30.1 PG (ref 26.8–34.3)
MCHC RBC AUTO-ENTMCNC: 30.8 G/DL (ref 31.4–37.4)
MCV RBC AUTO: 98 FL (ref 82–98)
PLATELET # BLD AUTO: 213 THOUSANDS/UL (ref 149–390)
PMV BLD AUTO: 9.2 FL (ref 8.9–12.7)
POTASSIUM SERPL-SCNC: 4.7 MMOL/L (ref 3.5–5.3)
RBC # BLD AUTO: 2.96 MILLION/UL (ref 3.81–5.12)
SODIUM SERPL-SCNC: 131 MMOL/L (ref 136–145)
WBC # BLD AUTO: 5.55 THOUSAND/UL (ref 4.31–10.16)

## 2019-10-04 PROCEDURE — 85027 COMPLETE CBC AUTOMATED: CPT | Performed by: INTERNAL MEDICINE

## 2019-10-04 PROCEDURE — 36415 COLL VENOUS BLD VENIPUNCTURE: CPT

## 2019-10-04 PROCEDURE — 80048 BASIC METABOLIC PNL TOTAL CA: CPT | Performed by: INTERNAL MEDICINE

## 2019-10-07 ENCOUNTER — OFFICE VISIT (OUTPATIENT)
Dept: INTERNAL MEDICINE CLINIC | Facility: CLINIC | Age: 84
End: 2019-10-07
Payer: COMMERCIAL

## 2019-10-07 VITALS
DIASTOLIC BLOOD PRESSURE: 60 MMHG | OXYGEN SATURATION: 98 % | BODY MASS INDEX: 25.13 KG/M2 | TEMPERATURE: 97.6 F | WEIGHT: 133 LBS | SYSTOLIC BLOOD PRESSURE: 148 MMHG | HEART RATE: 57 BPM

## 2019-10-07 DIAGNOSIS — Z23 FLU VACCINE NEED: ICD-10-CM

## 2019-10-07 DIAGNOSIS — K21.9 GASTROESOPHAGEAL REFLUX DISEASE WITHOUT ESOPHAGITIS: Primary | ICD-10-CM

## 2019-10-07 DIAGNOSIS — E03.9 HYPOTHYROIDISM, UNSPECIFIED TYPE: ICD-10-CM

## 2019-10-07 DIAGNOSIS — D50.9 IRON DEFICIENCY ANEMIA, UNSPECIFIED IRON DEFICIENCY ANEMIA TYPE: ICD-10-CM

## 2019-10-07 DIAGNOSIS — I10 ESSENTIAL HYPERTENSION: ICD-10-CM

## 2019-10-07 DIAGNOSIS — E78.2 MIXED HYPERLIPIDEMIA: ICD-10-CM

## 2019-10-07 DIAGNOSIS — E87.1 HYPONATREMIA: ICD-10-CM

## 2019-10-07 PROCEDURE — G0008 ADMIN INFLUENZA VIRUS VAC: HCPCS

## 2019-10-07 PROCEDURE — 90662 IIV NO PRSV INCREASED AG IM: CPT

## 2019-10-07 PROCEDURE — 99214 OFFICE O/P EST MOD 30 MIN: CPT | Performed by: INTERNAL MEDICINE

## 2019-10-07 RX ORDER — CEPHALEXIN 500 MG/1
500 CAPSULE ORAL 2 TIMES DAILY
Refills: 0 | COMMUNITY
Start: 2019-09-30 | End: 2019-12-16

## 2019-10-07 NOTE — PROGRESS NOTES
Assessment/Plan:    1  Essential hypertension  Blood pressure is acceptable on present regimen    2  Iron deficiency anemia  Hemoglobin is stable  Actually slightly better than previous 1  Will continue with iron supplementation and will check CBC in 1 week    3  Hypothyroidism  Continue with the levothyroxine    4  infection of prosthetic hip joint  She was on IV cephazolin now she is on cephalexin 500 mg b i d  As per infectious disease specialist and probably that is for indefinitely  Diagnoses and all orders for this visit:    Gastroesophageal reflux disease without esophagitis    Hypothyroidism, unspecified type    Essential hypertension  -     Basic metabolic panel; Future    Mixed hyperlipidemia    Hyponatremia    Iron deficiency anemia, unspecified iron deficiency anemia type  -     CBC; Future    Other orders  -     cephalexin (KEFLEX) 500 mg capsule; Take 500 mg by mouth 2 (two) times a day          Subjective:          Patient ID: Guido Gibson is a 80 y o  female  HPI    The following portions of the patient's history were reviewed and updated as appropriate: allergies, current medications, past family history, past medical history, past social history, past surgical history and problem list     Review of Systems   Constitutional: Positive for fatigue  Negative for fever  HENT: Negative for congestion, ear discharge, ear pain, postnasal drip, sinus pressure, sore throat, tinnitus and trouble swallowing  Eyes: Negative for discharge, itching and visual disturbance  Respiratory: Negative for cough and shortness of breath  Cardiovascular: Negative for chest pain and palpitations  Gastrointestinal: Negative for abdominal pain, diarrhea, nausea and vomiting  Endocrine: Negative for cold intolerance and polyuria  Genitourinary: Negative for difficulty urinating, dysuria and urgency  Musculoskeletal: Positive for arthralgias  Negative for neck pain  Skin: Negative for rash  Allergic/Immunologic: Negative for environmental allergies  Neurological: Negative for dizziness, weakness and headaches  Psychiatric/Behavioral: The patient is not nervous/anxious            Past Medical History:   Diagnosis Date    Allergic     Anemia     Arthritis     [facility]     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     Disease of thyroid gland     Fatigue     last assessed 8/5/15; resolved 9/30/16    [facility] 2013    GERD (gastroesophageal reflux disease)     Hyperlipidemia     Hypertension     Osteoporosis     Senile cataract     last assessed 10/18/13; resolved 2/6/15    Situational depression     last assessed 2/11/15; resolved 7/20/15    Visual impairment          Current Outpatient Medications:     acetaminophen (TYLENOL) 500 mg tablet, Take 1 tablet by mouth daily  , Disp: , Rfl:     amLODIPine (NORVASC) 5 mg tablet, Take 1 tablet (5 mg total) by mouth daily, Disp: 90 tablet, Rfl: 2    bisoprolol (ZEBETA) 10 MG tablet, Take 10 mg by mouth daily, Disp: , Rfl: 0    calcium carbonate (TUMS EX) 750 mg chewable tablet, Chew 750 mg Three times daily as needed, Disp: , Rfl:     carboxymethylcellulose 0 5 % SOLN, Apply 1 drop to eye 2 (two) times a day as needed, Disp: , Rfl:     Cholecalciferol (VITAMIN D3) 1000 units CAPS, Take 1 tablet by mouth daily, Disp: , Rfl:     ciprofloxacin (CILOXAN) 0 3 % ophthalmic solution, Administer 1 drop into the left eye every other day  , Disp: , Rfl:     clotrimazole-betamethasone (LOTRISONE) 1-0 05 % cream, Apply topically 2 (two) times a day, Disp: 30 g, Rfl: 0    doxazosin (CARDURA) 4 mg tablet, Take 4 mg by mouth 2 (two) times a day, Disp: , Rfl: 0    ferrous sulfate (FEROSUL) 325 (65 Fe) mg tablet, Take 1 tablet (325 mg total) by mouth 2 (two) times a day with meals, Disp: , Rfl:     furosemide (LASIX) 40 mg tablet, Take 1 tablet (40 mg total) by mouth daily, Disp: 90 tablet, Rfl: 1    levothyroxine 25 mcg tablet, Take 1 tablet (25 mcg total) by mouth daily, Disp: 90 tablet, Rfl: 1    losartan (COZAAR) 25 mg tablet, Take 75 mg by mouth 2 (two) times a day, Disp: , Rfl: 0    Multiple Vitamins-Minerals (CENTRUM SILVER 50+WOMEN PO), Take 1 tablet by mouth daily, Disp: , Rfl:     Multiple Vitamins-Minerals (PRESERVISION AREDS PO), Take by mouth daily, Disp: , Rfl:     omeprazole (PriLOSEC) 40 MG capsule, Take 1 capsule (40 mg total) by mouth daily, Disp: 90 capsule, Rfl: 1    potassium chloride (K-DUR,KLOR-CON) 20 mEq tablet, Take 20 mEq by mouth 2 (two) times a day, Disp: , Rfl: 0    prednisoLONE acetate (PRED FORTE) 1 % ophthalmic suspension, Administer 1 drop into the left eye every other day  , Disp: , Rfl:     pyridoxine (VITAMIN B6) 100 mg tablet, Take 100 mg by mouth daily  , Disp: , Rfl:     WHITE PETROLATUM-MINERAL OIL OP, Apply 1 application topically 2 (two) times a day, Disp: , Rfl:     cephalexin (KEFLEX) 500 mg capsule, Take 500 mg by mouth 2 (two) times a day, Disp: , Rfl: 0    polyethylene glycol-propylene glycol (SYSTANE) 0 4-0 3 %, Administer 2 drops to both eyes as needed  , Disp: , Rfl:     ropivacaine 0 5% 16mL, morphine (PF) 10 mg/mL 6 mg, EPINEPHrine 1:1000 0 2 mg, methylPRESNISolone acetate 40 mg in sodium chloride (PF) 0 9% 25 mL, 10 mL 2 (two) times a day, Disp: , Rfl:     Allergies   Allergen Reactions    Latex Rash    Oxycodone Nausea Only, Hallucinations and Other (See Comments)     Hallucinations    Oxycodone-Acetaminophen Nausea Only and Hallucinations    Pollen Extract Allergic Rhinitis       Social History   Past Surgical History:   Procedure Laterality Date    COLONOSCOPY      DENTAL SURGERY      EYE SURGERY      GASTRIC RESTRICTION SURGERY      for cancer    JOINT REPLACEMENT      KNEE SURGERY Right 01/2017    TKR    LYMPH NODE BIOPSY      STOMACH SURGERY       Family History   Problem Relation Age of Onset    Heart attack Mother     Diabetes Mother     Heart disease Mother     Prostate cancer Father        Objective:  /60 (BP Location: Right arm, Patient Position: Sitting, Cuff Size: Standard)   Pulse 57   Temp 97 6 °F (36 4 °C) (Oral)   Wt 60 3 kg (133 lb)   SpO2 98%   BMI 25 13 kg/m²   Body mass index is 25 13 kg/m²  Physical Exam   Constitutional: She appears well-developed  HENT:   Head: Normocephalic  Right Ear: External ear normal    Left Ear: External ear normal    Mouth/Throat: Oropharynx is clear and moist    Eyes: Pupils are equal, round, and reactive to light  No scleral icterus  Neck: Normal range of motion  Neck supple  No tracheal deviation present  No thyromegaly present  Cardiovascular: Normal rate, regular rhythm and normal heart sounds  Pulmonary/Chest: Effort normal and breath sounds normal  No respiratory distress  She exhibits no tenderness  Abdominal: Soft  Bowel sounds are normal  She exhibits no mass  There is no tenderness  Musculoskeletal: Normal range of motion  Lymphadenopathy:     She has no cervical adenopathy  Neurological: She is alert  No cranial nerve deficit  Skin: Skin is warm  Rash noted  Right arm PICC site is slightly hyperemic  Area clean with the alcohol pad and bandage with triple antibiotic cream applied  Psychiatric: She has a normal mood and affect

## 2019-10-11 ENCOUNTER — TELEPHONE (OUTPATIENT)
Dept: INTERNAL MEDICINE CLINIC | Facility: CLINIC | Age: 84
End: 2019-10-11

## 2019-10-14 DIAGNOSIS — E87.1 HYPONATREMIA: ICD-10-CM

## 2019-10-14 DIAGNOSIS — I10 ESSENTIAL HYPERTENSION: Primary | ICD-10-CM

## 2019-10-14 DIAGNOSIS — E87.6 HYPOKALEMIA: ICD-10-CM

## 2019-10-14 RX ORDER — DOXAZOSIN MESYLATE 4 MG/1
4 TABLET ORAL 2 TIMES DAILY
Qty: 180 TABLET | Refills: 1 | Status: SHIPPED | OUTPATIENT
Start: 2019-10-14 | End: 2020-04-03

## 2019-10-14 RX ORDER — POTASSIUM CHLORIDE 20 MEQ/1
20 TABLET, EXTENDED RELEASE ORAL 2 TIMES DAILY
Qty: 90 TABLET | Refills: 1 | Status: SHIPPED | OUTPATIENT
Start: 2019-10-14 | End: 2020-02-18 | Stop reason: SDUPTHER

## 2019-10-14 NOTE — TELEPHONE ENCOUNTER
Pt called for refills of Pot Cl ER & doxazosin, which were both filled at [facility] by Dr Liz Nova  TCM 09/25/2019  Please refill if appropriate

## 2019-10-15 DIAGNOSIS — Z85.028 HISTORY OF GASTRIC CANCER: ICD-10-CM

## 2019-10-15 RX ORDER — OMEPRAZOLE 40 MG/1
CAPSULE, DELAYED RELEASE ORAL
Qty: 90 CAPSULE | Refills: 1 | OUTPATIENT
Start: 2019-10-15

## 2019-10-19 ENCOUNTER — OFFICE VISIT (OUTPATIENT)
Dept: URGENT CARE | Age: 84
End: 2019-10-19
Payer: COMMERCIAL

## 2019-10-19 VITALS
WEIGHT: 133 LBS | HEART RATE: 59 BPM | BODY MASS INDEX: 25.13 KG/M2 | SYSTOLIC BLOOD PRESSURE: 179 MMHG | RESPIRATION RATE: 18 BRPM | TEMPERATURE: 97.6 F | DIASTOLIC BLOOD PRESSURE: 75 MMHG

## 2019-10-19 DIAGNOSIS — L23.3 ALLERGIC CONTACT DERMATITIS DUE TO DRUGS IN CONTACT WITH SKIN: Primary | ICD-10-CM

## 2019-10-19 PROCEDURE — 99213 OFFICE O/P EST LOW 20 MIN: CPT | Performed by: PHYSICIAN ASSISTANT

## 2019-10-19 NOTE — PROGRESS NOTES
[address] [facility]        NAME: Kenneth Rowe is a 80 y o  female  : 10/9/1930    MRN: [id number]  DATE: 2019  TIME: 5:17 PM    Assessment and Plan   Allergic contact dermatitis due to drugs in contact with skin [L23 3]  1  Allergic contact dermatitis due to drugs in contact with skin           Patient Instructions       Follow up with PCP in 3-5 days  Proceed to  ER if symptoms worsen  Chief Complaint     Chief Complaint   Patient presents with    Cellulitis     right skin irritation for 1 week         History of Present Illness       Pt with worsening rash to pic line site for 1-2 weeks  Worse since starting neosporin  Pt is presently on keflex by mouth     Rash   This is a new problem  The current episode started 1 to 4 weeks ago  The problem is unchanged  The affected locations include the right arm  The rash is characterized by redness  She was exposed to a new medication  Past treatments include antibiotic cream  The treatment provided no relief  There is no history of allergies, asthma, eczema or varicella  Review of Systems   Review of Systems   Constitutional: Negative  HENT: Negative  Eyes: Negative  Respiratory: Negative  Cardiovascular: Negative  Gastrointestinal: Negative  Endocrine: Negative  Genitourinary: Negative  Musculoskeletal: Negative  Skin: Positive for rash  Allergic/Immunologic: Negative  Neurological: Negative  Hematological: Negative  Psychiatric/Behavioral: Negative  All other systems reviewed and are negative          Current Medications       Current Outpatient Medications:     acetaminophen (TYLENOL) 500 mg tablet, Take 1 tablet by mouth daily  , Disp: , Rfl:     amLODIPine (NORVASC) 5 mg tablet, Take 1 tablet (5 mg total) by mouth daily, Disp: 90 tablet, Rfl: 2    bisoprolol (ZEBETA) 10 MG tablet, Take 10 mg by mouth daily, Disp: , Rfl: 0    calcium carbonate (TUMS EX) 750 mg chewable tablet, Chew 750 mg Three times daily as needed, Disp: , Rfl:     carboxymethylcellulose 0 5 % SOLN, Apply 1 drop to eye 2 (two) times a day as needed, Disp: , Rfl:     cephalexin (KEFLEX) 500 mg capsule, Take 500 mg by mouth 2 (two) times a day, Disp: , Rfl: 0    Cholecalciferol (VITAMIN D3) 1000 units CAPS, Take 1 tablet by mouth daily, Disp: , Rfl:     ciprofloxacin (CILOXAN) 0 3 % ophthalmic solution, Administer 1 drop into the left eye every other day  , Disp: , Rfl:     clotrimazole-betamethasone (LOTRISONE) 1-0 05 % cream, Apply topically 2 (two) times a day, Disp: 30 g, Rfl: 0    doxazosin (CARDURA) 4 mg tablet, Take 1 tablet (4 mg total) by mouth 2 (two) times a day, Disp: 180 tablet, Rfl: 1    ferrous sulfate (FEROSUL) 325 (65 Fe) mg tablet, Take 1 tablet (325 mg total) by mouth 2 (two) times a day with meals, Disp: , Rfl:     furosemide (LASIX) 40 mg tablet, Take 1 tablet (40 mg total) by mouth daily, Disp: 90 tablet, Rfl: 1    levothyroxine 25 mcg tablet, Take 1 tablet (25 mcg total) by mouth daily, Disp: 90 tablet, Rfl: 1    losartan (COZAAR) 25 mg tablet, Take 75 mg by mouth 2 (two) times a day, Disp: , Rfl: 0    Multiple Vitamins-Minerals (CENTRUM SILVER 50+WOMEN PO), Take 1 tablet by mouth daily, Disp: , Rfl:     Multiple Vitamins-Minerals (PRESERVISION AREDS PO), Take by mouth daily, Disp: , Rfl:     omeprazole (PriLOSEC) 40 MG capsule, Take 1 capsule (40 mg total) by mouth daily, Disp: 90 capsule, Rfl: 1    polyethylene glycol-propylene glycol (SYSTANE) 0 4-0 3 %, Administer 2 drops to both eyes as needed  , Disp: , Rfl:     potassium chloride (K-DUR,KLOR-CON) 20 mEq tablet, Take 1 tablet (20 mEq total) by mouth 2 (two) times a day, Disp: 90 tablet, Rfl: 1    prednisoLONE acetate (PRED FORTE) 1 % ophthalmic suspension, Administer 1 drop into the left eye every other day  , Disp: , Rfl:     pyridoxine (VITAMIN B6) 100 mg tablet, Take 100 mg by mouth daily  , Disp: , Rfl:     WHITE PETROLATUM-MINERAL OIL OP, Apply 1 application topically 2 (two) times a day, Disp: , Rfl:     Current Allergies     Allergies as of 10/19/2019 - Reviewed 10/19/2019   Allergen Reaction Noted    Latex Rash 11/17/2017    Oxycodone Nausea Only, Hallucinations, and Other (See Comments) 05/11/2017    Oxycodone-acetaminophen Nausea Only and Hallucinations 05/11/2017    Pollen extract Allergic Rhinitis 07/21/2015            The following portions of the patient's history were reviewed and updated as appropriate: allergies, current medications, past family history, past medical history, past social history, past surgical history and problem list      Past Medical History:   Diagnosis Date    Allergic     Anemia     Arthritis     [facility]     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[facility] )     Disease of thyroid gland     Fatigue     last assessed 8/5/15; resolved 9/30/16    [facility]    GERD (gastroesophageal reflux disease)     Hyperlipidemia     Hypertension     Osteoporosis     Senile cataract     last assessed 10/18/13; resolved 2/6/15    Situational depression     last assessed 2/11/15; resolved 7/20/15    Visual impairment        Past Surgical History:   Procedure Laterality Date    COLONOSCOPY      DENTAL SURGERY      EYE SURGERY      GASTRIC RESTRICTION SURGERY      for cancer    JOINT REPLACEMENT      KNEE SURGERY Right 01/2017    TKR    LYMPH NODE BIOPSY      STOMACH SURGERY         Family History   Problem Relation Age of Onset    Heart attack Mother     Diabetes Mother     Heart disease Mother     Prostate cancer Father          Medications have been verified  Objective   BP (!) 179/75   Pulse 59   Temp 97 6 °F (36 4 °C)   Resp 18   Wt 60 3 kg (133 lb)   BMI 25 13 kg/m²        Physical Exam     Physical Exam   Constitutional: She is oriented to person, place, and time  She appears well-developed and well-nourished  HENT:   Head: Normocephalic and atraumatic     Right Ear: External ear normal  Left Ear: External ear normal    Nose: Nose normal    Mouth/Throat: Oropharynx is clear and moist    Eyes: Pupils are equal, round, and reactive to light  EOM are normal    Neck: Normal range of motion  Neck supple  Cardiovascular: Normal rate, regular rhythm and normal heart sounds  Pulmonary/Chest: Effort normal and breath sounds normal    Abdominal: Soft  Bowel sounds are normal    Musculoskeletal: Normal range of motion  Neurological: She is alert and oriented to person, place, and time  Skin: Skin is warm  Capillary refill takes less than 2 seconds  Right medial distal upper arm erythema minor  No warmth not tender  Serous /clear draining  No open wound   Suspect neosporin allergic dermatitis     Will apply non adhesive dressing and contiue keflex po    Psychiatric: She has a normal mood and affect  Her behavior is normal    Nursing note and vitals reviewed

## 2019-10-19 NOTE — PATIENT INSTRUCTIONS
Contact Dermatitis   WHAT YOU NEED TO KNOW:   Contact dermatitis is a skin rash  It develops when you touch something that irritates your skin or causes an allergic reaction  DISCHARGE INSTRUCTIONS:   Call 911 for any of the following:   · You have sudden trouble breathing  · Your throat swells and you have trouble eating  · Your face is swollen  Contact your healthcare provider if:   · You have a fever  · Your blisters are draining pus  · Your rash spreads or does not get better, even after treatment  · You have questions or concerns about your condition or care  Medicines:   · Medicines  help decrease itching and swelling  They will be given as a topical medicine to apply to your rash or as a pill  · Take your medicine as directed  Contact your healthcare provider if you think your medicine is not helping or if you have side effects  Tell him or her if you are allergic to any medicine  Keep a list of the medicines, vitamins, and herbs you take  Include the amounts, and when and why you take them  Bring the list or the pill bottles to follow-up visits  Carry your medicine list with you in case of an emergency  Manage contact dermatitis:   · Take short baths or showers in cool water  Use mild soap or soap-free cleansers  Add oatmeal, baking soda, or cornstarch to the bath water to help decrease skin irritation  · Avoid skin irritants , such as makeup, hair products, soaps, and cleansers  Use products that do not contain perfume or dye  · Apply a cool compress to your rash  This will help soothe your skin  · Keep your skin moist   Rub unscented cream or lotion on your skin to prevent dryness and itching  Do this right after a bath or shower when your skin is still damp  Follow up with your healthcare provider or dermatologist in 2 to 3 days:  Write down your questions so you remember to ask them during your visits     © 2017 [employer] Information is for End User's use only and may not be sold, redistributed or otherwise used for commercial purposes  All illustrations and images included in CareNotes® are the copyrighted property of A [employer] M , Inc  or Dimas Dasilva  The above information is an  only  It is not intended as medical advice for individual conditions or treatments  Talk to your doctor, nurse or pharmacist before following any medical regimen to see if it is safe and effective for you

## 2019-10-21 ENCOUNTER — TELEPHONE (OUTPATIENT)
Dept: INTERNAL MEDICINE CLINIC | Facility: CLINIC | Age: 84
End: 2019-10-21

## 2019-10-21 ENCOUNTER — OFFICE VISIT (OUTPATIENT)
Dept: INTERNAL MEDICINE CLINIC | Age: 84
End: 2019-10-21
Payer: COMMERCIAL

## 2019-10-21 VITALS
DIASTOLIC BLOOD PRESSURE: 50 MMHG | HEART RATE: 56 BPM | SYSTOLIC BLOOD PRESSURE: 138 MMHG | WEIGHT: 135.4 LBS | TEMPERATURE: 97.1 F | BODY MASS INDEX: 25.58 KG/M2 | OXYGEN SATURATION: 98 %

## 2019-10-21 DIAGNOSIS — B36.9 FUNGAL DERMATITIS: ICD-10-CM

## 2019-10-21 DIAGNOSIS — E03.9 HYPOTHYROIDISM, UNSPECIFIED TYPE: ICD-10-CM

## 2019-10-21 DIAGNOSIS — I10 ESSENTIAL HYPERTENSION: Primary | ICD-10-CM

## 2019-10-21 DIAGNOSIS — E87.6 HYPOKALEMIA: ICD-10-CM

## 2019-10-21 PROCEDURE — 99213 OFFICE O/P EST LOW 20 MIN: CPT | Performed by: INTERNAL MEDICINE

## 2019-10-21 RX ORDER — CLOTRIMAZOLE AND BETAMETHASONE DIPROPIONATE 10; .64 MG/G; MG/G
CREAM TOPICAL 2 TIMES DAILY
Qty: 30 G | Refills: 2 | Status: SHIPPED | OUTPATIENT
Start: 2019-10-21 | End: 2020-09-15 | Stop reason: ALTCHOICE

## 2019-10-21 RX ORDER — LOSARTAN POTASSIUM 25 MG/1
75 TABLET ORAL 2 TIMES DAILY
Qty: 180 TABLET | Refills: 1 | Status: SHIPPED | OUTPATIENT
Start: 2019-10-21 | End: 2019-12-26 | Stop reason: SDUPTHER

## 2019-10-21 NOTE — PROGRESS NOTES
Assessment/Plan:    1  Right arm rash most likely secondary to allergic reaction to Neosporin/candidal infection  Plan  Advised to discontinue Neosporin or any other over-the-counter antibiotic cream     Will prescribed Lotrisone ointment t i d   And daughter will notify me if it get worse  2  Essential hypertension  Will continue with present regimen    Follow-up in couple of weeks  Diagnoses and all orders for this visit:    Essential hypertension  -     losartan (COZAAR) 25 mg tablet; Take 3 tablets (75 mg total) by mouth 2 (two) times a day    Hypothyroidism, unspecified type    Fungal dermatitis    Hypokalemia               Subjective:          Patient ID: Mona Lancaster is a 80 y o  female  Patient came to the clinic with a complain of for rash over right arm  Her PICC line was removed about 2 weeks ago and there was little bit redness in she was using Neosporin and now 8 H is with some crust over that  The following portions of the patient's history were reviewed and updated as appropriate: allergies, current medications, past family history, past medical history, past social history, past surgical history and problem list     Review of Systems   Constitutional: Negative for fatigue and fever  HENT: Negative for congestion, ear discharge, ear pain, postnasal drip, sinus pressure, sore throat, tinnitus and trouble swallowing  Eyes: Negative for discharge, itching and visual disturbance  Respiratory: Negative for cough and shortness of breath  Cardiovascular: Negative for chest pain and palpitations  Gastrointestinal: Negative for abdominal pain, diarrhea, nausea and vomiting  Endocrine: Negative for cold intolerance and polyuria  Genitourinary: Negative for difficulty urinating, dysuria and urgency  Musculoskeletal: Negative for arthralgias and neck pain  Skin: Positive for rash  Allergic/Immunologic: Negative for environmental allergies     Neurological: Negative for dizziness, weakness and headaches  Psychiatric/Behavioral: Negative for agitation  The patient is not nervous/anxious            Past Medical History:   Diagnosis Date    Allergic     Anemia     Arthritis     [facility]     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[facility] )     Disease of thyroid gland     Fatigue     last assessed 8/5/15; resolved 9/30/16    G[facility] ([facility] ) 2013    GERD (gastroesophageal reflux disease)     Hyperlipidemia     Hypertension     Osteoporosis     Senile cataract     last assessed 10/18/13; resolved 2/6/15    Situational depression     last assessed 2/11/15; resolved 7/20/15    Visual impairment          Current Outpatient Medications:     acetaminophen (TYLENOL) 500 mg tablet, Take 1 tablet by mouth daily  , Disp: , Rfl:     amLODIPine (NORVASC) 5 mg tablet, Take 1 tablet (5 mg total) by mouth daily, Disp: 90 tablet, Rfl: 2    bisoprolol (ZEBETA) 10 MG tablet, Take 10 mg by mouth daily, Disp: , Rfl: 0    calcium carbonate (TUMS EX) 750 mg chewable tablet, Chew 750 mg Three times daily as needed, Disp: , Rfl:     carboxymethylcellulose 0 5 % SOLN, Apply 1 drop to eye 2 (two) times a day as needed, Disp: , Rfl:     cephalexin (KEFLEX) 500 mg capsule, Take 500 mg by mouth 2 (two) times a day, Disp: , Rfl: 0    Cholecalciferol (VITAMIN D3) 1000 units CAPS, Take 1 tablet by mouth daily, Disp: , Rfl:     ciprofloxacin (CILOXAN) 0 3 % ophthalmic solution, Administer 1 drop into the left eye every other day  , Disp: , Rfl:     doxazosin (CARDURA) 4 mg tablet, Take 1 tablet (4 mg total) by mouth 2 (two) times a day, Disp: 180 tablet, Rfl: 1    ferrous sulfate (FEROSUL) 325 (65 Fe) mg tablet, Take 1 tablet (325 mg total) by mouth 2 (two) times a day with meals, Disp: , Rfl:     furosemide (LASIX) 40 mg tablet, Take 1 tablet (40 mg total) by mouth daily, Disp: 90 tablet, Rfl: 1    levothyroxine 25 mcg tablet, Take 1 tablet (25 mcg total) by mouth daily, Disp: 90 tablet, Rfl: 1   losartan (COZAAR) 25 mg tablet, Take 75 mg by mouth 2 (two) times a day, Disp: , Rfl: 0    Multiple Vitamins-Minerals (CENTRUM SILVER 50+WOMEN PO), Take 1 tablet by mouth daily, Disp: , Rfl:     Multiple Vitamins-Minerals (PRESERVISION AREDS PO), Take by mouth daily, Disp: , Rfl:     omeprazole (PriLOSEC) 40 MG capsule, Take 1 capsule (40 mg total) by mouth daily, Disp: 90 capsule, Rfl: 1    polyethylene glycol-propylene glycol (SYSTANE) 0 4-0 3 %, Administer 2 drops to both eyes as needed  , Disp: , Rfl:     potassium chloride (K-DUR,KLOR-CON) 20 mEq tablet, Take 1 tablet (20 mEq total) by mouth 2 (two) times a day, Disp: 90 tablet, Rfl: 1    prednisoLONE acetate (PRED FORTE) 1 % ophthalmic suspension, Administer 1 drop into the left eye every other day  , Disp: , Rfl:     pyridoxine (VITAMIN B6) 100 mg tablet, Take 100 mg by mouth daily  , Disp: , Rfl:     WHITE PETROLATUM-MINERAL OIL OP, Apply 1 application topically 2 (two) times a day, Disp: , Rfl:     clotrimazole-betamethasone (LOTRISONE) 1-0 05 % cream, Apply topically 2 (two) times a day, Disp: 30 g, Rfl: 0    Allergies   Allergen Reactions    Latex Rash    Oxycodone Nausea Only, Hallucinations and Other (See Comments)     Hallucinations    Oxycodone-Acetaminophen Nausea Only and Hallucinations    Pollen Extract Allergic Rhinitis       Social History   Past Surgical History:   Procedure Laterality Date    COLONOSCOPY      DENTAL SURGERY      EYE SURGERY      GASTRIC RESTRICTION SURGERY      for cancer    JOINT REPLACEMENT      KNEE SURGERY Right 01/2017    TKR    LYMPH NODE BIOPSY      STOMACH SURGERY       Family History   Problem Relation Age of Onset    Heart attack Mother     Diabetes Mother     Heart disease Mother     Prostate cancer Father        Objective:  /50 (BP Location: Left arm, Patient Position: Sitting, Cuff Size: Standard)   Pulse 56   Temp (!) 97 1 °F (36 2 °C) (Tympanic)   Wt 61 4 kg (135 lb 6 4 oz) SpO2 98%   BMI 25 58 kg/m²   Body mass index is 25 58 kg/m²  Physical Exam   Constitutional: She appears well-developed  HENT:   Head: Normocephalic  Right Ear: External ear normal    Left Ear: External ear normal    Mouth/Throat: Oropharynx is clear and moist    Eyes: Pupils are equal, round, and reactive to light  No scleral icterus  Neck: Normal range of motion  Neck supple  No tracheal deviation present  No thyromegaly present  Cardiovascular: Normal rate, regular rhythm and normal heart sounds  Pulmonary/Chest: Effort normal and breath sounds normal  No respiratory distress  She exhibits no tenderness  Abdominal: Soft  Bowel sounds are normal  She exhibits no mass  There is no tenderness  Musculoskeletal: Normal range of motion  Lymphadenopathy:     She has no cervical adenopathy  Neurological: She is alert  No cranial nerve deficit  Skin: Skin is warm  Rash noted  Over right arm about 4 in to 3 inch area of a rash noted  Skin appears very dry and crusty  No sign of infection  Psychiatric: She has a normal mood and affect

## 2019-10-21 NOTE — TELEPHONE ENCOUNTER
kenzie with  homecare called to see if physical therapy orders can be extended the orders are up this week  Also since she was seen today by dr Kraig Frankel for her rash/open area if they want nursing to come in and care for it then give permission for those orders as well

## 2019-10-22 DIAGNOSIS — R26.2 AMBULATORY DYSFUNCTION: Primary | ICD-10-CM

## 2019-10-30 ENCOUNTER — CLINICAL SUPPORT (OUTPATIENT)
Dept: INTERNAL MEDICINE CLINIC | Facility: CLINIC | Age: 84
End: 2019-10-30
Payer: COMMERCIAL

## 2019-10-30 DIAGNOSIS — D50.9 IRON DEFICIENCY ANEMIA, UNSPECIFIED IRON DEFICIENCY ANEMIA TYPE: ICD-10-CM

## 2019-10-30 DIAGNOSIS — I10 ESSENTIAL HYPERTENSION: Primary | ICD-10-CM

## 2019-10-30 LAB
ANION GAP SERPL CALCULATED.3IONS-SCNC: 6 MMOL/L (ref 4–13)
BUN SERPL-MCNC: 17 MG/DL (ref 5–25)
CALCIUM SERPL-MCNC: 9.6 MG/DL (ref 8.3–10.1)
CHLORIDE SERPL-SCNC: 98 MMOL/L (ref 100–108)
CO2 SERPL-SCNC: 31 MMOL/L (ref 21–32)
CREAT SERPL-MCNC: 0.74 MG/DL (ref 0.6–1.3)
ERYTHROCYTE [DISTWIDTH] IN BLOOD BY AUTOMATED COUNT: 13.8 % (ref 11.6–15.1)
GFR SERPL CREATININE-BSD FRML MDRD: 72 ML/MIN/1.73SQ M
GLUCOSE P FAST SERPL-MCNC: 92 MG/DL (ref 65–99)
HCT VFR BLD AUTO: 29.7 % (ref 34.8–46.1)
HGB BLD-MCNC: 9.2 G/DL (ref 11.5–15.4)
MCH RBC QN AUTO: 29.2 PG (ref 26.8–34.3)
MCHC RBC AUTO-ENTMCNC: 31 G/DL (ref 31.4–37.4)
MCV RBC AUTO: 94 FL (ref 82–98)
PLATELET # BLD AUTO: 196 THOUSANDS/UL (ref 149–390)
PMV BLD AUTO: 9.2 FL (ref 8.9–12.7)
POTASSIUM SERPL-SCNC: 4.3 MMOL/L (ref 3.5–5.3)
RBC # BLD AUTO: 3.15 MILLION/UL (ref 3.81–5.12)
SODIUM SERPL-SCNC: 135 MMOL/L (ref 136–145)
WBC # BLD AUTO: 4.82 THOUSAND/UL (ref 4.31–10.16)

## 2019-10-30 PROCEDURE — 36415 COLL VENOUS BLD VENIPUNCTURE: CPT

## 2019-10-30 PROCEDURE — 80048 BASIC METABOLIC PNL TOTAL CA: CPT | Performed by: INTERNAL MEDICINE

## 2019-10-30 PROCEDURE — 85027 COMPLETE CBC AUTOMATED: CPT | Performed by: INTERNAL MEDICINE

## 2019-11-04 ENCOUNTER — OFFICE VISIT (OUTPATIENT)
Dept: INTERNAL MEDICINE CLINIC | Age: 84
End: 2019-11-04
Payer: COMMERCIAL

## 2019-11-04 VITALS
DIASTOLIC BLOOD PRESSURE: 50 MMHG | BODY MASS INDEX: 26.15 KG/M2 | SYSTOLIC BLOOD PRESSURE: 136 MMHG | OXYGEN SATURATION: 99 % | TEMPERATURE: 97.4 F | WEIGHT: 138.4 LBS | HEART RATE: 58 BPM

## 2019-11-04 DIAGNOSIS — M17.0 OSTEOARTHRITIS OF BOTH KNEES, UNSPECIFIED OSTEOARTHRITIS TYPE: ICD-10-CM

## 2019-11-04 DIAGNOSIS — E03.9 HYPOTHYROIDISM, UNSPECIFIED TYPE: ICD-10-CM

## 2019-11-04 DIAGNOSIS — K21.9 GASTROESOPHAGEAL REFLUX DISEASE WITHOUT ESOPHAGITIS: ICD-10-CM

## 2019-11-04 DIAGNOSIS — E78.2 MIXED HYPERLIPIDEMIA: ICD-10-CM

## 2019-11-04 DIAGNOSIS — E87.1 HYPONATREMIA: ICD-10-CM

## 2019-11-04 DIAGNOSIS — I10 ESSENTIAL HYPERTENSION: Primary | ICD-10-CM

## 2019-11-04 PROBLEM — E87.6 HYPOKALEMIA: Status: RESOLVED | Noted: 2019-09-25 | Resolved: 2019-11-04

## 2019-11-04 PROCEDURE — 99214 OFFICE O/P EST MOD 30 MIN: CPT | Performed by: INTERNAL MEDICINE

## 2019-11-04 RX ORDER — BISOPROLOL FUMARATE 10 MG/1
10 TABLET ORAL DAILY
Qty: 90 TABLET | Refills: 0 | Status: SHIPPED | OUTPATIENT
Start: 2019-11-04 | End: 2020-02-10 | Stop reason: SDUPTHER

## 2019-11-04 NOTE — PROGRESS NOTES
Assessment/Plan:  Rash    Is the resolving  Will continue with the Lotrisone cream     2  Chronic anemia  Hemoglobin is better than previous 1  Will continue with iron supplementation  Will check CBC in about 2 months    3  Essential hypertension  Blood pressure is stable on present regimen    4  Hypothyroidism  Will continue with present dose of levothyroxine  Will check thyroid function test before next visit     Diagnoses and all orders for this visit:    Essential hypertension  -     bisoprolol (ZEBETA) 10 MG tablet; Take 1 tablet (10 mg total) by mouth daily  -     Basic metabolic panel; Future  -     CBC; Future    Gastroesophageal reflux disease without esophagitis    Mixed hyperlipidemia    Hyponatremia  -     Basic metabolic panel; Future    Osteoarthritis of both knees, unspecified osteoarthritis type    Hypothyroidism, unspecified type  -     TSH, 3rd generation with Free T4 reflex; Future          Subjective:      Patient ID: Allie Arizmendi is a 80 y o  female  The patient is here for follow up  The rash on her right arm is improving since using lotrisone but she now also has a rash on her back  10/30 CRP < 3  Rash   Associated symptoms include fatigue, rhinorrhea and shortness of breath  Pertinent negatives include no congestion, cough, diarrhea, eye pain or fever  The following portions of the patient's history were reviewed and updated as appropriate: allergies, current medications, past family history, past medical history, past social history, past surgical history and problem list     Review of Systems   Constitutional: Positive for fatigue  Negative for fever  HENT: Positive for rhinorrhea  Negative for congestion and sneezing  Eyes: Negative for pain and itching  Respiratory: Positive for shortness of breath  Negative for cough  Cardiovascular: Positive for leg swelling  Negative for chest pain     Gastrointestinal: Negative for abdominal pain, constipation and diarrhea  Skin: Positive for rash  Psychiatric/Behavioral: Negative for agitation and behavioral problems  Objective:      /50 (BP Location: Left arm, Patient Position: Sitting, Cuff Size: Standard)   Pulse 58   Temp (!) 97 4 °F (36 3 °C) (Tympanic)   Wt 62 8 kg (138 lb 6 4 oz)   SpO2 99%   BMI 26 15 kg/m²          Physical Exam   Constitutional: She appears well-developed  HENT:   Head: Normocephalic  Mouth/Throat: Oropharynx is clear and moist    Eyes: Pupils are equal, round, and reactive to light  No scleral icterus  Neck: Normal range of motion  Neck supple  No tracheal deviation present  No thyromegaly present  Cardiovascular: Normal rate, regular rhythm and normal heart sounds  Pulmonary/Chest: Effort normal and breath sounds normal  No respiratory distress  She exhibits no tenderness  Abdominal: Soft  Bowel sounds are normal  She exhibits no mass  There is no tenderness  Musculoskeletal: Normal range of motion  Lymphadenopathy:     She has no cervical adenopathy  Neurological: She is alert  No cranial nerve deficit  Skin: Skin is warm and dry  Multiple area of for dry skin over back noted  Psychiatric: She has a normal mood and affect

## 2019-11-18 ENCOUNTER — TELEPHONE (OUTPATIENT)
Dept: INTERNAL MEDICINE CLINIC | Facility: CLINIC | Age: 84
End: 2019-11-18

## 2019-11-18 NOTE — TELEPHONE ENCOUNTER
Pt needs amlodipine refilled :      Pt is having swelling in knees/legs/feet and wheezing I recommendded [facility] but they did schedule for tomorrow  Dr Nicole Pop in [place]

## 2019-11-19 ENCOUNTER — OFFICE VISIT (OUTPATIENT)
Dept: INTERNAL MEDICINE CLINIC | Age: 84
End: 2019-11-19
Payer: COMMERCIAL

## 2019-11-19 VITALS
BODY MASS INDEX: 27 KG/M2 | WEIGHT: 143 LBS | HEART RATE: 55 BPM | HEIGHT: 61 IN | OXYGEN SATURATION: 97 % | DIASTOLIC BLOOD PRESSURE: 60 MMHG | TEMPERATURE: 97.4 F | SYSTOLIC BLOOD PRESSURE: 140 MMHG

## 2019-11-19 DIAGNOSIS — H61.21 EXCESSIVE CERUMEN IN RIGHT EAR CANAL: ICD-10-CM

## 2019-11-19 DIAGNOSIS — E78.2 MIXED HYPERLIPIDEMIA: ICD-10-CM

## 2019-11-19 DIAGNOSIS — I10 ESSENTIAL HYPERTENSION: ICD-10-CM

## 2019-11-19 DIAGNOSIS — E03.9 HYPOTHYROIDISM, UNSPECIFIED TYPE: ICD-10-CM

## 2019-11-19 DIAGNOSIS — J40 BRONCHITIS: ICD-10-CM

## 2019-11-19 DIAGNOSIS — K21.9 GASTROESOPHAGEAL REFLUX DISEASE WITHOUT ESOPHAGITIS: Primary | ICD-10-CM

## 2019-11-19 PROCEDURE — 99213 OFFICE O/P EST LOW 20 MIN: CPT | Performed by: INTERNAL MEDICINE

## 2019-11-19 PROCEDURE — 1160F RVW MEDS BY RX/DR IN RCRD: CPT | Performed by: INTERNAL MEDICINE

## 2019-11-19 PROCEDURE — 1036F TOBACCO NON-USER: CPT | Performed by: INTERNAL MEDICINE

## 2019-11-19 PROCEDURE — 69209 REMOVE IMPACTED EAR WAX UNI: CPT | Performed by: INTERNAL MEDICINE

## 2019-11-19 RX ORDER — AMLODIPINE BESYLATE 5 MG/1
5 TABLET ORAL DAILY
Qty: 90 TABLET | Refills: 2 | Status: SHIPPED | OUTPATIENT
Start: 2019-11-19 | End: 2020-07-15 | Stop reason: SDUPTHER

## 2019-11-19 RX ORDER — FUROSEMIDE 20 MG/1
20 TABLET ORAL DAILY
Qty: 90 TABLET | Refills: 3 | Status: SHIPPED | OUTPATIENT
Start: 2019-11-19 | End: 2020-12-01 | Stop reason: SDUPTHER

## 2019-11-19 RX ORDER — ALBUTEROL SULFATE 90 UG/1
2 AEROSOL, METERED RESPIRATORY (INHALATION) EVERY 6 HOURS PRN
Qty: 1 INHALER | Refills: 3 | Status: SHIPPED | OUTPATIENT
Start: 2019-11-19 | End: 2021-06-14

## 2019-11-19 NOTE — PROGRESS NOTES
Assessment/Plan:    1  Impacted wax in right ear  So ranging irrigation done and wax removed  Tolerated procedure very well  No complications  2  Bilateral lower extremity edema  Most likely secondary to her posture  She sits most of the time at her home on kitchen table  Advised to use recliner  She is on Lasix 40 mg right now  Will increase it to 60 mg total   Will give another prescription for 20 mg of furosemide  Will check electrolytes before next visit         Diagnoses and all orders for this visit:    Essential hypertension  -     amLODIPine (NORVASC) 5 mg tablet; Take 1 tablet (5 mg total) by mouth daily               Subjective:          Patient ID: Inderjit James is a 80 y o  female  Came to the office with the complain of difficulty in hearing right ear and also complaining of worsening swelling of lower extremities along with some wheezing off and on  The following portions of the patient's history were reviewed and updated as appropriate: allergies, current medications, past family history, past medical history, past social history, past surgical history and problem list     Review of Systems   Constitutional: Negative for fatigue and fever  HENT: Positive for congestion and hearing loss  Negative for ear discharge, ear pain, postnasal drip, sinus pressure, sore throat, tinnitus and trouble swallowing  Eyes: Negative for discharge, itching and visual disturbance  Respiratory: Negative for cough and shortness of breath  Cardiovascular: Positive for leg swelling  Negative for chest pain and palpitations  Gastrointestinal: Negative for abdominal pain, diarrhea, nausea and vomiting  Endocrine: Negative for cold intolerance and polyuria  Genitourinary: Negative for difficulty urinating, dysuria and urgency  Musculoskeletal: Negative for arthralgias and neck pain  Skin: Negative for rash  Allergic/Immunologic: Negative for environmental allergies     Neurological: Negative for dizziness, weakness and headaches  Psychiatric/Behavioral: The patient is not nervous/anxious            Past Medical History:   Diagnosis Date    Allergic     Anemia     Arthritis     [facility]     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[facility] )     Disease of thyroid gland     Fatigue     last assessed 8/5/15; resolved 9/30/16    Gastric cancer ([facility] ) 2013    GERD (gastroesophageal reflux disease)     Hyperlipidemia     Hypertension     Osteoporosis     Senile cataract     last assessed 10/18/13; resolved 2/6/15    Situational depression     last assessed 2/11/15; resolved 7/20/15    Visual impairment          Current Outpatient Medications:     acetaminophen (TYLENOL) 500 mg tablet, Take 1 tablet by mouth daily  , Disp: , Rfl:     amLODIPine (NORVASC) 5 mg tablet, Take 1 tablet (5 mg total) by mouth daily, Disp: 90 tablet, Rfl: 2    bisoprolol (ZEBETA) 10 MG tablet, Take 1 tablet (10 mg total) by mouth daily, Disp: 90 tablet, Rfl: 0    calcium carbonate (TUMS EX) 750 mg chewable tablet, Chew 750 mg Three times daily as needed, Disp: , Rfl:     carboxymethylcellulose 0 5 % SOLN, Apply 1 drop to eye 2 (two) times a day as needed, Disp: , Rfl:     cephalexin (KEFLEX) 500 mg capsule, Take 500 mg by mouth 2 (two) times a day, Disp: , Rfl: 0    Cholecalciferol (VITAMIN D3) 1000 units CAPS, Take 1 tablet by mouth daily, Disp: , Rfl:     ciprofloxacin (CILOXAN) 0 3 % ophthalmic solution, Administer 1 drop into the left eye every other day  , Disp: , Rfl:     clotrimazole-betamethasone (LOTRISONE) 1-0 05 % cream, Apply topically 2 (two) times a day, Disp: 30 g, Rfl: 2    doxazosin (CARDURA) 4 mg tablet, Take 1 tablet (4 mg total) by mouth 2 (two) times a day, Disp: 180 tablet, Rfl: 1    ferrous sulfate (FEROSUL) 325 (65 Fe) mg tablet, Take 1 tablet (325 mg total) by mouth 2 (two) times a day with meals, Disp: , Rfl:     furosemide (LASIX) 40 mg tablet, Take 1 tablet (40 mg total) by mouth daily, Disp: 90 tablet, Rfl: 1    levothyroxine 25 mcg tablet, Take 1 tablet (25 mcg total) by mouth daily, Disp: 90 tablet, Rfl: 1    losartan (COZAAR) 25 mg tablet, Take 3 tablets (75 mg total) by mouth 2 (two) times a day, Disp: 180 tablet, Rfl: 1    Multiple Vitamins-Minerals (CENTRUM SILVER 50+WOMEN PO), Take 1 tablet by mouth daily, Disp: , Rfl:     Multiple Vitamins-Minerals (PRESERVISION AREDS PO), Take by mouth daily, Disp: , Rfl:     omeprazole (PriLOSEC) 40 MG capsule, Take 1 capsule (40 mg total) by mouth daily, Disp: 90 capsule, Rfl: 1    polyethylene glycol-propylene glycol (SYSTANE) 0 4-0 3 %, Administer 2 drops to both eyes as needed  , Disp: , Rfl:     potassium chloride (K-DUR,KLOR-CON) 20 mEq tablet, Take 1 tablet (20 mEq total) by mouth 2 (two) times a day, Disp: 90 tablet, Rfl: 1    prednisoLONE acetate (PRED FORTE) 1 % ophthalmic suspension, Administer 1 drop into the left eye every other day  , Disp: , Rfl:     pyridoxine (VITAMIN B6) 100 mg tablet, Take 100 mg by mouth daily  , Disp: , Rfl:     WHITE PETROLATUM-MINERAL OIL OP, Apply 1 application topically 2 (two) times a day, Disp: , Rfl:     Allergies   Allergen Reactions    Latex Rash    Oxycodone Nausea Only, Hallucinations and Other (See Comments)     Hallucinations    Oxycodone-Acetaminophen Nausea Only and Hallucinations    Pollen Extract Allergic Rhinitis       Social History   Past Surgical History:   Procedure Laterality Date    COLONOSCOPY      DENTAL SURGERY      EYE SURGERY      GASTRIC RESTRICTION SURGERY      for cancer    JOINT REPLACEMENT      KNEE SURGERY Right 01/2017    TKR    LYMPH NODE BIOPSY      STOMACH SURGERY       Family History   Problem Relation Age of Onset    Heart attack Mother     Diabetes Mother     Heart disease Mother     Prostate cancer Father        Objective:  /60 (BP Location: Left arm, Patient Position: Sitting, Cuff Size: Adult)   Pulse 55   Temp (!) 97 4 °F (36 3 °C) (Tympanic)   Ht 5' 1" (1 549 m) Comment: pt reported  Wt 64 9 kg (143 lb)   SpO2 97%   BMI 27 02 kg/m²   Body mass index is 27 02 kg/m²  Physical Exam   Constitutional: She appears well-developed  HENT:   Head: Normocephalic  Mouth/Throat: Oropharynx is clear and moist    Significant wax noted in right external auditory canal and tympanic membrane is not visible  Left ear external auditory canal appears normal mild wax tympanic membranes appears normal   Eyes: Pupils are equal, round, and reactive to light  No scleral icterus  Neck: Normal range of motion  Neck supple  No tracheal deviation present  No thyromegaly present  Cardiovascular: Normal rate and regular rhythm  Murmur heard  Pulmonary/Chest: Effort normal and breath sounds normal  No respiratory distress  She exhibits no tenderness  Abdominal: Soft  Bowel sounds are normal  She exhibits no mass  There is no tenderness  Musculoskeletal: Normal range of motion  She exhibits edema  2+ bilateral lower extremity edema present   Lymphadenopathy:     She has no cervical adenopathy  Neurological: She is alert  No cranial nerve deficit  Skin: Skin is warm  Psychiatric: She has a normal mood and affect

## 2019-12-03 ENCOUNTER — APPOINTMENT (OUTPATIENT)
Dept: LAB | Age: 84
End: 2019-12-03
Payer: COMMERCIAL

## 2019-12-03 ENCOUNTER — APPOINTMENT (OUTPATIENT)
Dept: RADIOLOGY | Age: 84
End: 2019-12-03
Payer: COMMERCIAL

## 2019-12-03 ENCOUNTER — OFFICE VISIT (OUTPATIENT)
Dept: INTERNAL MEDICINE CLINIC | Age: 84
End: 2019-12-03
Payer: COMMERCIAL

## 2019-12-03 VITALS
OXYGEN SATURATION: 99 % | DIASTOLIC BLOOD PRESSURE: 52 MMHG | SYSTOLIC BLOOD PRESSURE: 140 MMHG | TEMPERATURE: 97.9 F | HEIGHT: 61 IN | WEIGHT: 143.8 LBS | BODY MASS INDEX: 27.15 KG/M2 | HEART RATE: 58 BPM

## 2019-12-03 DIAGNOSIS — I10 ESSENTIAL HYPERTENSION: ICD-10-CM

## 2019-12-03 DIAGNOSIS — J40 BRONCHITIS: Primary | ICD-10-CM

## 2019-12-03 DIAGNOSIS — R60.9 EDEMA, UNSPECIFIED TYPE: ICD-10-CM

## 2019-12-03 DIAGNOSIS — E78.2 MIXED HYPERLIPIDEMIA: ICD-10-CM

## 2019-12-03 DIAGNOSIS — J40 BRONCHITIS: ICD-10-CM

## 2019-12-03 LAB
ANION GAP SERPL CALCULATED.3IONS-SCNC: 5 MMOL/L (ref 4–13)
BUN SERPL-MCNC: 19 MG/DL (ref 5–25)
CALCIUM SERPL-MCNC: 10.1 MG/DL (ref 8.3–10.1)
CHLORIDE SERPL-SCNC: 97 MMOL/L (ref 100–108)
CO2 SERPL-SCNC: 32 MMOL/L (ref 21–32)
CREAT SERPL-MCNC: 0.83 MG/DL (ref 0.6–1.3)
ERYTHROCYTE [DISTWIDTH] IN BLOOD BY AUTOMATED COUNT: 13.8 % (ref 11.6–15.1)
GFR SERPL CREATININE-BSD FRML MDRD: 63 ML/MIN/1.73SQ M
GLUCOSE SERPL-MCNC: 99 MG/DL (ref 65–140)
HCT VFR BLD AUTO: 31 % (ref 34.8–46.1)
HGB BLD-MCNC: 9.7 G/DL (ref 11.5–15.4)
MCH RBC QN AUTO: 28.9 PG (ref 26.8–34.3)
MCHC RBC AUTO-ENTMCNC: 31.3 G/DL (ref 31.4–37.4)
MCV RBC AUTO: 92 FL (ref 82–98)
NT-PROBNP SERPL-MCNC: 2095 PG/ML
PLATELET # BLD AUTO: 229 THOUSANDS/UL (ref 149–390)
PMV BLD AUTO: 9.4 FL (ref 8.9–12.7)
POTASSIUM SERPL-SCNC: 4.1 MMOL/L (ref 3.5–5.3)
RBC # BLD AUTO: 3.36 MILLION/UL (ref 3.81–5.12)
SODIUM SERPL-SCNC: 134 MMOL/L (ref 136–145)
WBC # BLD AUTO: 5.26 THOUSAND/UL (ref 4.31–10.16)

## 2019-12-03 PROCEDURE — 80048 BASIC METABOLIC PNL TOTAL CA: CPT

## 2019-12-03 PROCEDURE — 85027 COMPLETE CBC AUTOMATED: CPT

## 2019-12-03 PROCEDURE — 99213 OFFICE O/P EST LOW 20 MIN: CPT | Performed by: INTERNAL MEDICINE

## 2019-12-03 PROCEDURE — 71046 X-RAY EXAM CHEST 2 VIEWS: CPT

## 2019-12-03 PROCEDURE — 83880 ASSAY OF NATRIURETIC PEPTIDE: CPT

## 2019-12-03 PROCEDURE — 36415 COLL VENOUS BLD VENIPUNCTURE: CPT

## 2019-12-03 RX ORDER — PREDNISONE 20 MG/1
20 TABLET ORAL 2 TIMES DAILY WITH MEALS
Qty: 10 TABLET | Refills: 0 | Status: SHIPPED | OUTPATIENT
Start: 2019-12-03 | End: 2020-01-07 | Stop reason: ALTCHOICE

## 2019-12-03 NOTE — PROGRESS NOTES
Assessment/Plan:  1  Resolving bronchitis  Will start prednisone 20 mg b i d  For 5 days  Advised to continue with albuterol inhaler  Will also request chest x-ray and today      2  Lower extremity edema rule out congestive heart failure  Will request echocardiogram   BNP will be ordered  Will continue with present dose of diuretics  She does have a echocardiogram and the nuclear stress test in 2017 which was unremarkable  Diagnoses and all orders for this visit:    Bronchitis    Essential hypertension    Mixed hyperlipidemia    Edema, unspecified type               Subjective:          Patient ID: Tessa Vicente is a 80 y o  female  Patient came to the office with a complain of cough and wheezing  Also complain of short of breath with the exertion  no chest pain  The following portions of the patient's history were reviewed and updated as appropriate: allergies, current medications, past family history, past medical history, past social history, past surgical history and problem list     Review of Systems   Constitutional: Negative for fatigue and fever  HENT: Negative for congestion, ear discharge, ear pain, postnasal drip, sinus pressure, sore throat, tinnitus and trouble swallowing  Eyes: Negative for discharge, itching and visual disturbance  Respiratory: Positive for cough and shortness of breath  Cardiovascular: Positive for leg swelling  Negative for chest pain and palpitations  Gastrointestinal: Negative for abdominal pain, diarrhea, nausea and vomiting  Endocrine: Negative for cold intolerance and polyuria  Genitourinary: Negative for difficulty urinating, dysuria and urgency  Musculoskeletal: Negative for arthralgias and neck pain  Skin: Negative for rash  Allergic/Immunologic: Negative for environmental allergies  Neurological: Negative for dizziness, weakness and headaches  Psychiatric/Behavioral: The patient is not nervous/anxious            Past Medical History:   Diagnosis Date    Allergic     Anemia     Arthritis     [facility] )     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[facility] )     Disease of thyroid gland     Fatigue     last assessed 8/5/15; resolved 9/30/16    Gastric cancer ([facility] ) 2013    GERD (gastroesophageal reflux disease)     Hyperlipidemia     Hypertension     Osteoporosis     Senile cataract     last assessed 10/18/13; resolved 2/6/15    Situational depression     last assessed 2/11/15; resolved 7/20/15    Visual impairment          Current Outpatient Medications:     acetaminophen (TYLENOL) 500 mg tablet, Take 1 tablet by mouth daily  , Disp: , Rfl:     albuterol (VENTOLIN HFA) 90 mcg/act inhaler, Inhale 2 puffs every 6 (six) hours as needed for wheezing, Disp: 1 Inhaler, Rfl: 3    amLODIPine (NORVASC) 5 mg tablet, Take 1 tablet (5 mg total) by mouth daily, Disp: 90 tablet, Rfl: 2    bisoprolol (ZEBETA) 10 MG tablet, Take 1 tablet (10 mg total) by mouth daily, Disp: 90 tablet, Rfl: 0    calcium carbonate (TUMS EX) 750 mg chewable tablet, Chew 750 mg Three times daily as needed, Disp: , Rfl:     carboxymethylcellulose 0 5 % SOLN, Apply 1 drop to eye 2 (two) times a day as needed, Disp: , Rfl:     cephalexin (KEFLEX) 500 mg capsule, Take 500 mg by mouth 2 (two) times a day, Disp: , Rfl: 0    Cholecalciferol (VITAMIN D3) 1000 units CAPS, Take 1 tablet by mouth daily, Disp: , Rfl:     ciprofloxacin (CILOXAN) 0 3 % ophthalmic solution, Administer 1 drop into the left eye every other day  , Disp: , Rfl:     doxazosin (CARDURA) 4 mg tablet, Take 1 tablet (4 mg total) by mouth 2 (two) times a day, Disp: 180 tablet, Rfl: 1    ferrous sulfate (FEROSUL) 325 (65 Fe) mg tablet, Take 1 tablet (325 mg total) by mouth 2 (two) times a day with meals, Disp: , Rfl:     furosemide (LASIX) 20 mg tablet, Take 1 tablet (20 mg total) by mouth daily, Disp: 90 tablet, Rfl: 3    furosemide (LASIX) 40 mg tablet, Take 1 tablet (40 mg total) by mouth daily, Disp: 90 tablet, Rfl: 1    levothyroxine 25 mcg tablet, Take 1 tablet (25 mcg total) by mouth daily, Disp: 90 tablet, Rfl: 1    losartan (COZAAR) 25 mg tablet, Take 3 tablets (75 mg total) by mouth 2 (two) times a day, Disp: 180 tablet, Rfl: 1    Multiple Vitamins-Minerals (CENTRUM SILVER 50+WOMEN PO), Take 1 tablet by mouth daily, Disp: , Rfl:     Multiple Vitamins-Minerals (PRESERVISION AREDS PO), Take by mouth daily, Disp: , Rfl:     omeprazole (PriLOSEC) 40 MG capsule, Take 1 capsule (40 mg total) by mouth daily, Disp: 90 capsule, Rfl: 1    polyethylene glycol-propylene glycol (SYSTANE) 0 4-0 3 %, Administer 2 drops to both eyes as needed  , Disp: , Rfl:     potassium chloride (K-DUR,KLOR-CON) 20 mEq tablet, Take 1 tablet (20 mEq total) by mouth 2 (two) times a day, Disp: 90 tablet, Rfl: 1    prednisoLONE acetate (PRED FORTE) 1 % ophthalmic suspension, Administer 1 drop into the left eye every other day  , Disp: , Rfl:     pyridoxine (VITAMIN B6) 100 mg tablet, Take 100 mg by mouth daily  , Disp: , Rfl:     clotrimazole-betamethasone (LOTRISONE) 1-0 05 % cream, Apply topically 2 (two) times a day (Patient not taking: Reported on 12/3/2019), Disp: 30 g, Rfl: 2    WHITE PETROLATUM-MINERAL OIL OP, Apply 1 application topically 2 (two) times a day, Disp: , Rfl:     Allergies   Allergen Reactions    Latex Rash    Oxycodone Nausea Only, Hallucinations and Other (See Comments)     Hallucinations    Oxycodone-Acetaminophen Nausea Only and Hallucinations    Pollen Extract Allergic Rhinitis       Social History   Past Surgical History:   Procedure Laterality Date    COLONOSCOPY      DENTAL SURGERY      EYE SURGERY      GASTRIC RESTRICTION SURGERY      for cancer    JOINT REPLACEMENT      KNEE SURGERY Right 01/2017    TKR    LYMPH NODE BIOPSY      STOMACH SURGERY       Family History   Problem Relation Age of Onset    Heart attack Mother     Diabetes Mother     Heart disease Mother     Prostate cancer Father        Objective:  /52 (BP Location: Left arm, Patient Position: Sitting, Cuff Size: Adult)   Pulse 58   Temp 97 9 °F (36 6 °C) (Tympanic)   Ht 5' 1" (1 549 m)   Wt 65 2 kg (143 lb 12 8 oz) Comment: with shoes  SpO2 99% Comment: rekha  BMI 27 17 kg/m²   Body mass index is 27 17 kg/m²  Physical Exam   Constitutional: She appears well-developed  HENT:   Head: Normocephalic  Right Ear: External ear normal    Left Ear: External ear normal    Mouth/Throat: Oropharynx is clear and moist    Eyes: Pupils are equal, round, and reactive to light  No scleral icterus  Neck: Normal range of motion  Neck supple  No tracheal deviation present  No thyromegaly present  Cardiovascular: Normal rate and regular rhythm  Murmur heard  Pulmonary/Chest: Effort normal and breath sounds normal  No respiratory distress  She exhibits no tenderness  Course breath sounds present   Abdominal: Soft  Bowel sounds are normal  She exhibits no mass  There is no tenderness  Musculoskeletal: Normal range of motion  She exhibits edema  2+ bilateral lower extremity edema present   Lymphadenopathy:     She has no cervical adenopathy  Neurological: She is alert  No cranial nerve deficit  Skin: Skin is warm  Psychiatric: She has a normal mood and affect

## 2019-12-10 ENCOUNTER — TELEPHONE (OUTPATIENT)
Dept: INTERNAL MEDICINE CLINIC | Age: 84
End: 2019-12-10

## 2019-12-10 NOTE — TELEPHONE ENCOUNTER
LMOM FOR THE DAUGHTER RENA TO CALL BACK TO MAKE A FOLLOW UP ON THE LEGS AND TO ADJUST MEDICATION IF NEEDED

## 2019-12-10 NOTE — TELEPHONE ENCOUNTER
Would recommend that patient is either see this week or next week for follow up, patient will have to see another provider

## 2019-12-10 NOTE — TELEPHONE ENCOUNTER
Patient's daughter Harjinder Hernandez is calling regarding two things on her mother  Patient was to have an echocardiogram but the soonest anyone can get her in is the end of January  Also patient was increased with the furosemide to 80 mg on the last visit and her legs are back to normal with no swelling or pain in them  She is afraid that if she continues the 80 mg that she will get dehydrated  Patient was to have an appointment with Dr Oksana Sanders on Monday but had to cancel due to him not going to be here  She does have an appt on 01/07/2020 with him Dr Oksana Sanders can they wait this long on this medication issue or can someone please help the daughter out on the dosage change as well  Please call her back at the home number and lmom if she doesn't answer    She has errands to do today

## 2019-12-12 DIAGNOSIS — I10 ESSENTIAL HYPERTENSION: ICD-10-CM

## 2019-12-16 ENCOUNTER — OFFICE VISIT (OUTPATIENT)
Dept: INTERNAL MEDICINE CLINIC | Facility: CLINIC | Age: 84
End: 2019-12-16
Payer: COMMERCIAL

## 2019-12-16 VITALS
SYSTOLIC BLOOD PRESSURE: 118 MMHG | DIASTOLIC BLOOD PRESSURE: 60 MMHG | HEART RATE: 60 BPM | WEIGHT: 132 LBS | TEMPERATURE: 97.5 F | HEIGHT: 61 IN | BODY MASS INDEX: 24.92 KG/M2 | OXYGEN SATURATION: 98 %

## 2019-12-16 DIAGNOSIS — I50.9 CHRONIC CONGESTIVE HEART FAILURE, UNSPECIFIED HEART FAILURE TYPE (HCC): ICD-10-CM

## 2019-12-16 DIAGNOSIS — Z85.028 HISTORY OF GASTRIC CANCER: ICD-10-CM

## 2019-12-16 DIAGNOSIS — T84.50XD INFECTION OF PROSTHETIC JOINT, SUBSEQUENT ENCOUNTER: Primary | ICD-10-CM

## 2019-12-16 PROBLEM — J40 BRONCHITIS: Status: RESOLVED | Noted: 2018-04-09 | Resolved: 2019-12-16

## 2019-12-16 PROBLEM — H61.21 EXCESSIVE CERUMEN IN RIGHT EAR CANAL: Status: RESOLVED | Noted: 2019-11-19 | Resolved: 2019-12-16

## 2019-12-16 PROBLEM — B36.9 FUNGAL DERMATITIS: Status: RESOLVED | Noted: 2019-09-25 | Resolved: 2019-12-16

## 2019-12-16 PROCEDURE — 1036F TOBACCO NON-USER: CPT | Performed by: INTERNAL MEDICINE

## 2019-12-16 PROCEDURE — 1160F RVW MEDS BY RX/DR IN RCRD: CPT | Performed by: INTERNAL MEDICINE

## 2019-12-16 PROCEDURE — 99213 OFFICE O/P EST LOW 20 MIN: CPT | Performed by: INTERNAL MEDICINE

## 2019-12-16 RX ORDER — OMEPRAZOLE 40 MG/1
40 CAPSULE, DELAYED RELEASE ORAL DAILY
Qty: 90 CAPSULE | Refills: 1 | Status: SHIPPED | OUTPATIENT
Start: 2019-12-16 | End: 2020-03-17

## 2019-12-16 RX ORDER — CEPHALEXIN 500 MG/1
500 CAPSULE ORAL 2 TIMES DAILY
Qty: 60 CAPSULE | Refills: 0
Start: 2019-12-16 | End: 2020-01-07 | Stop reason: ALTCHOICE

## 2019-12-16 NOTE — PROGRESS NOTES
Assessment/Plan:  No problem-specific Assessment & Plan notes found for this encounter  Diagnoses and all orders for this visit:    Infection of prosthetic joint, subsequent encounter  -     cephalexin (KEFLEX) 500 mg capsule; Take 1 capsule (500 mg total) by mouth 2 (two) times a day    History of gastric cancer  -     omeprazole (PriLOSEC) 40 MG capsule; Take 1 capsule (40 mg total) by mouth daily    Chronic congestive heart failure, unspecified heart failure type ([facility] [address] )  -     Basic metabolic panel; Future  -     NT-BNP PRO; Future        Subjective:   Chief Complaint   Patient presents with    Follow-up     from bronchitis per Dr Pascual Baldwin  stated she is feeling better     GERD    Hypothyroidism    Hypertension        Patient ID: Allie Arizmendi is a 80 y o  female  HPI  This is very pleasant 80 years young lady who was seen recently for fluid overload and possible heart failure her BNP was significantly elevated suggestive of cardiac failure or heart failure she responded very well to the diuretic her lower leg is much better she is not wheezing she is not having any shortness of breath or chest pain she was on prednisone which is taken off now  Or she finished the 5 days course also other history of hypothyroidism, follow-up TSH hypertension is very well controlled she lost about 10 lb since the last visit from 143-132  I will check her BMP and BNP before her next visit with the her PCP  An echocardiogram was order to define systolic versus diastolic heart failure so far the echocardiogram is not done  The following portions of the patient's history were reviewed and updated as appropriate: allergies, current medications, past family history, past medical history, past social history, past surgical history and problem list     Review of Systems   Constitutional: Positive for fatigue  Negative for chills     HENT: Negative for congestion, ear pain, hearing loss, postnasal drip, sinus pressure, sore throat and voice change  Eyes: Negative for pain, discharge and visual disturbance  Respiratory: Negative for cough, chest tightness and shortness of breath  Cardiovascular: Positive for leg swelling  Negative for chest pain and palpitations  Gastrointestinal: Negative for abdominal pain, blood in stool, diarrhea, nausea and rectal pain  Genitourinary: Negative for difficulty urinating, dysuria and urgency  Musculoskeletal: Negative for arthralgias and joint swelling  Skin: Negative for rash  Allergic/Immunologic: Negative for environmental allergies and food allergies  Neurological: Negative for dizziness, tremors, weakness, numbness and headaches  Hematological: Negative for adenopathy  Psychiatric/Behavioral: Negative for behavioral problems and hallucinations           Past Medical History:   Diagnosis Date    Allergic     Anemia     Arthritis     [facility] ([facility] )     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[place] [address] )     Disease of thyroid gland     Fatigue     last assessed 8/5/15; resolved 9/30/16    Gastric cancer ([place] [address] ) 2013    GERD (gastroesophageal reflux disease)     Hyperlipidemia     Hypertension     Osteoporosis     Senile cataract     last assessed 10/18/13; resolved 2/6/15    Situational depression     last assessed 2/11/15; resolved 7/20/15    Visual impairment          Current Outpatient Medications:     acetaminophen (TYLENOL) 500 mg tablet, Take 1 tablet by mouth daily  , Disp: , Rfl:     albuterol (VENTOLIN HFA) 90 mcg/act inhaler, Inhale 2 puffs every 6 (six) hours as needed for wheezing, Disp: 1 Inhaler, Rfl: 3    amLODIPine (NORVASC) 5 mg tablet, Take 1 tablet (5 mg total) by mouth daily, Disp: 90 tablet, Rfl: 2    bisoprolol (ZEBETA) 10 MG tablet, Take 1 tablet (10 mg total) by mouth daily, Disp: 90 tablet, Rfl: 0    calcium carbonate (TUMS EX) 750 mg chewable tablet, Chew 750 mg Three times daily as needed, Disp: , Rfl:     carboxymethylcellulose 0 5 % SOLN, Apply 1 drop to eye 2 (two) times a day as needed, Disp: , Rfl:     cephalexin (KEFLEX) 500 mg capsule, Take 500 mg by mouth 2 (two) times a day, Disp: , Rfl: 0    Cholecalciferol (VITAMIN D3) 1000 units CAPS, Take 1 tablet by mouth daily, Disp: , Rfl:     ciprofloxacin (CILOXAN) 0 3 % ophthalmic solution, Administer 1 drop into the left eye every other day  , Disp: , Rfl:     clotrimazole-betamethasone (LOTRISONE) 1-0 05 % cream, Apply topically 2 (two) times a day, Disp: 30 g, Rfl: 2    doxazosin (CARDURA) 4 mg tablet, Take 1 tablet (4 mg total) by mouth 2 (two) times a day, Disp: 180 tablet, Rfl: 1    ferrous sulfate (FEROSUL) 325 (65 Fe) mg tablet, Take 1 tablet (325 mg total) by mouth 2 (two) times a day with meals, Disp: , Rfl:     furosemide (LASIX) 20 mg tablet, Take 1 tablet (20 mg total) by mouth daily, Disp: 90 tablet, Rfl: 3    furosemide (LASIX) 40 mg tablet, Take 1 tablet (40 mg total) by mouth daily, Disp: 90 tablet, Rfl: 1    levothyroxine 25 mcg tablet, Take 1 tablet (25 mcg total) by mouth daily, Disp: 90 tablet, Rfl: 1    losartan (COZAAR) 25 mg tablet, Take 3 tablets (75 mg total) by mouth 2 (two) times a day, Disp: 180 tablet, Rfl: 1    Multiple Vitamins-Minerals (CENTRUM SILVER 50+WOMEN PO), Take 1 tablet by mouth daily, Disp: , Rfl:     Multiple Vitamins-Minerals (PRESERVISION AREDS PO), Take by mouth daily, Disp: , Rfl:     omeprazole (PriLOSEC) 40 MG capsule, Take 1 capsule (40 mg total) by mouth daily, Disp: 90 capsule, Rfl: 1    polyethylene glycol-propylene glycol (SYSTANE) 0 4-0 3 %, Administer 2 drops to both eyes as needed  , Disp: , Rfl:     potassium chloride (K-DUR,KLOR-CON) 20 mEq tablet, Take 1 tablet (20 mEq total) by mouth 2 (two) times a day, Disp: 90 tablet, Rfl: 1    prednisoLONE acetate (PRED FORTE) 1 % ophthalmic suspension, Administer 1 drop into the left eye every other day  , Disp: , Rfl:     predniSONE 20 mg tablet, Take 1 tablet (20 mg total) by mouth 2 (two) times a day with meals, Disp: 10 tablet, Rfl: 0    pyridoxine (VITAMIN B6) 100 mg tablet, Take 100 mg by mouth daily  , Disp: , Rfl:     WHITE PETROLATUM-MINERAL OIL OP, Apply 1 application topically 2 (two) times a day, Disp: , Rfl:     Allergies   Allergen Reactions    Latex Rash    Oxycodone Nausea Only, Hallucinations and Other (See Comments)     Hallucinations  Hallucinations  Other reaction(s): Hallucinations  Hallucinations    Oxycodone-Acetaminophen Nausea Only and Hallucinations    Pollen Extract Allergic Rhinitis       Social History   Past Surgical History:   Procedure Laterality Date    COLONOSCOPY      DENTAL SURGERY      EYE SURGERY      GASTRIC RESTRICTION SURGERY      for cancer    JOINT REPLACEMENT      KNEE SURGERY Right 01/2017    TKR    LYMPH NODE BIOPSY      STOMACH SURGERY       Family History   Problem Relation Age of Onset    Heart attack Mother     Diabetes Mother     Heart disease Mother     Prostate cancer Father        Objective:  /60 (BP Location: Left arm, Patient Position: Sitting, Cuff Size: Adult)   Pulse 60   Temp 97 5 °F (36 4 °C) (Oral)   Ht 5' 1" (1 549 m)   Wt 59 9 kg (132 lb)   SpO2 98%   BMI 24 94 kg/m²        Physical Exam   Constitutional: She is oriented to person, place, and time  She appears well-developed and well-nourished  HENT:   Right Ear: External ear normal    Mouth/Throat: Oropharynx is clear and moist    Eyes: Pupils are equal, round, and reactive to light  Conjunctivae and EOM are normal    Neck: Normal range of motion  No JVD present  No thyromegaly present  Cardiovascular: Normal rate, regular rhythm, normal heart sounds and intact distal pulses  Pulmonary/Chest: Breath sounds normal    Abdominal: Soft  Bowel sounds are normal    Musculoskeletal: Normal range of motion  She exhibits edema  Lymphadenopathy:     She has no cervical adenopathy  Neurological: She is alert and oriented to person, place, and time   She has normal reflexes  Skin: Skin is dry  Psychiatric: She has a normal mood and affect   Her behavior is normal  Judgment and thought content normal          Recent Results (from the past 672 hour(s))   Basic metabolic panel    Collection Time: 12/03/19  2:59 PM   Result Value Ref Range    Sodium 134 (L) 136 - 145 mmol/L    Potassium 4 1 3 5 - 5 3 mmol/L    Chloride 97 (L) 100 - 108 mmol/L    CO2 32 21 - 32 mmol/L    ANION GAP 5 4 - 13 mmol/L    BUN 19 5 - 25 mg/dL    Creatinine 0 83 0 60 - 1 30 mg/dL    Glucose 99 65 - 140 mg/dL    Calcium 10 1 8 3 - 10 1 mg/dL    eGFR 63 ml/min/1 73sq m   CBC    Collection Time: 12/03/19  2:59 PM   Result Value Ref Range    WBC 5 26 4 31 - 10 16 Thousand/uL    RBC 3 36 (L) 3 81 - 5 12 Million/uL    Hemoglobin 9 7 (L) 11 5 - 15 4 g/dL    Hematocrit 31 0 (L) 34 8 - 46 1 %    MCV 92 82 - 98 fL    MCH 28 9 26 8 - 34 3 pg    MCHC 31 3 (L) 31 4 - 37 4 g/dL    RDW 13 8 11 6 - 15 1 %    Platelets 698 487 - 861 Thousands/uL    MPV 9 4 8 9 - 12 7 fL   NT-BNP PRO    Collection Time: 12/03/19  2:59 PM   Result Value Ref Range    NT-proBNP 2,095 (H) <450 pg/mL

## 2019-12-22 RX ORDER — LOSARTAN POTASSIUM 25 MG/1
TABLET ORAL
Qty: 180 TABLET | Refills: 0 | OUTPATIENT
Start: 2019-12-22

## 2019-12-26 DIAGNOSIS — I10 ESSENTIAL HYPERTENSION: ICD-10-CM

## 2019-12-26 RX ORDER — LOSARTAN POTASSIUM 25 MG/1
75 TABLET ORAL 2 TIMES DAILY
Qty: 180 TABLET | Refills: 5 | Status: SHIPPED | OUTPATIENT
Start: 2019-12-26 | End: 2020-07-15 | Stop reason: SDUPTHER

## 2019-12-26 NOTE — TELEPHONE ENCOUNTER
Patients daughter stopped in because she stated she left a message on the prescription line on Monday stating that she needs a refill on her losartan 25 mg  Needs it sent to [facility] in [place]

## 2019-12-31 ENCOUNTER — CLINICAL SUPPORT (OUTPATIENT)
Dept: INTERNAL MEDICINE CLINIC | Facility: CLINIC | Age: 84
End: 2019-12-31
Payer: COMMERCIAL

## 2019-12-31 DIAGNOSIS — I50.9 CHRONIC CONGESTIVE HEART FAILURE, UNSPECIFIED HEART FAILURE TYPE (HCC): Primary | ICD-10-CM

## 2019-12-31 LAB
ANION GAP SERPL CALCULATED.3IONS-SCNC: 4 MMOL/L (ref 4–13)
BUN SERPL-MCNC: 25 MG/DL (ref 5–25)
CALCIUM SERPL-MCNC: 9.9 MG/DL (ref 8.3–10.1)
CHLORIDE SERPL-SCNC: 103 MMOL/L (ref 100–108)
CO2 SERPL-SCNC: 32 MMOL/L (ref 21–32)
CREAT SERPL-MCNC: 0.9 MG/DL (ref 0.6–1.3)
GFR SERPL CREATININE-BSD FRML MDRD: 57 ML/MIN/1.73SQ M
GLUCOSE P FAST SERPL-MCNC: 97 MG/DL (ref 65–99)
NT-PROBNP SERPL-MCNC: 1840 PG/ML
POTASSIUM SERPL-SCNC: 4.6 MMOL/L (ref 3.5–5.3)
SODIUM SERPL-SCNC: 139 MMOL/L (ref 136–145)

## 2019-12-31 PROCEDURE — 80048 BASIC METABOLIC PNL TOTAL CA: CPT | Performed by: INTERNAL MEDICINE

## 2019-12-31 PROCEDURE — 36415 COLL VENOUS BLD VENIPUNCTURE: CPT

## 2019-12-31 PROCEDURE — 83880 ASSAY OF NATRIURETIC PEPTIDE: CPT | Performed by: INTERNAL MEDICINE

## 2020-01-07 ENCOUNTER — OFFICE VISIT (OUTPATIENT)
Dept: INTERNAL MEDICINE CLINIC | Facility: CLINIC | Age: 85
End: 2020-01-07
Payer: COMMERCIAL

## 2020-01-07 VITALS
DIASTOLIC BLOOD PRESSURE: 68 MMHG | OXYGEN SATURATION: 96 % | HEART RATE: 53 BPM | BODY MASS INDEX: 25.81 KG/M2 | SYSTOLIC BLOOD PRESSURE: 130 MMHG | TEMPERATURE: 98.1 F | WEIGHT: 136.6 LBS

## 2020-01-07 DIAGNOSIS — I10 ESSENTIAL HYPERTENSION: ICD-10-CM

## 2020-01-07 DIAGNOSIS — E03.9 HYPOTHYROIDISM, UNSPECIFIED TYPE: ICD-10-CM

## 2020-01-07 DIAGNOSIS — I50.9 CHRONIC CONGESTIVE HEART FAILURE, UNSPECIFIED HEART FAILURE TYPE (HCC): ICD-10-CM

## 2020-01-07 DIAGNOSIS — K21.9 GASTROESOPHAGEAL REFLUX DISEASE WITHOUT ESOPHAGITIS: Primary | ICD-10-CM

## 2020-01-07 PROCEDURE — 1036F TOBACCO NON-USER: CPT | Performed by: INTERNAL MEDICINE

## 2020-01-07 PROCEDURE — 1160F RVW MEDS BY RX/DR IN RCRD: CPT | Performed by: INTERNAL MEDICINE

## 2020-01-07 PROCEDURE — 99214 OFFICE O/P EST MOD 30 MIN: CPT | Performed by: INTERNAL MEDICINE

## 2020-01-07 NOTE — PROGRESS NOTES
Assessment/Plan:    1  Acute on chronic congestive heart failure most likely diastolic  Echocardiogram is still pending  Will try to call them today and schedule it as soon as possible  Will continue with Lasix 60 mg daily and potassium supplementation  Will check renal function before next visit    2  Essential hypertension  Blood pressure is acceptable  Will continue with present medication    3  Hypothyroidism  Continue with levothyroxine 25 mcg daily  Will check thyroid function test before next visit       Diagnoses and all orders for this visit:    Gastroesophageal reflux disease without esophagitis    Hypothyroidism, unspecified type  -     TSH, 3rd generation with Free T4 reflex; Future    Essential hypertension  -     Basic metabolic panel; Future  -     CBC; Future    Chronic congestive heart failure, unspecified heart failure type ([facility] [address] )  -     Basic metabolic panel; Future  -     CBC; Future               Subjective:          Patient ID: Jaleesa Salcedo is a 80 y o  female  PATIENT IS HERE FOR FOLLOW-UP LOWER EXTREMITY EDEMA  SHE IS FEELING BETTER  SWELLING IS BETTER  NO SHORTNESS OF BREATH OR CHEST PAIN  Presently she is on Lasix 60 mg daily  Echocardiogram is still not done  The following portions of the patient's history were reviewed and updated as appropriate: allergies, current medications, past family history, past medical history, past social history, past surgical history and problem list     Review of Systems   Constitutional: Negative for fatigue and fever  HENT: Negative for congestion, ear discharge, ear pain, postnasal drip, sinus pressure, sore throat, tinnitus and trouble swallowing  Eyes: Negative for discharge, itching and visual disturbance  Respiratory: Negative for cough, chest tightness and shortness of breath  Cardiovascular: Positive for leg swelling  Negative for chest pain and palpitations     Gastrointestinal: Negative for abdominal pain, diarrhea, nausea and vomiting  Endocrine: Negative for cold intolerance and polyuria  Genitourinary: Negative for difficulty urinating, dysuria and urgency  Musculoskeletal: Negative for arthralgias and neck pain  Skin: Negative for rash  Allergic/Immunologic: Negative for environmental allergies  Neurological: Negative for dizziness, weakness and headaches  Psychiatric/Behavioral: The patient is not nervous/anxious            Past Medical History:   Diagnosis Date    Allergic     Anemia     Arthritis     Cancer ([place] [address] )     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[place] [address] )     Disease of thyroid gland     Fatigue     last assessed 8/5/15; resolved 9/30/16    Gastric cancer ([place] [address] ) 2013    GERD (gastroesophageal reflux disease)     Hyperlipidemia     Hypertension     Osteoporosis     Senile cataract     last assessed 10/18/13; resolved 2/6/15    Situational depression     last assessed 2/11/15; resolved 7/20/15    Visual impairment          Current Outpatient Medications:     acetaminophen (TYLENOL) 500 mg tablet, Take 1 tablet by mouth daily  , Disp: , Rfl:     albuterol (VENTOLIN HFA) 90 mcg/act inhaler, Inhale 2 puffs every 6 (six) hours as needed for wheezing, Disp: 1 Inhaler, Rfl: 3    amLODIPine (NORVASC) 5 mg tablet, Take 1 tablet (5 mg total) by mouth daily, Disp: 90 tablet, Rfl: 2    bisoprolol (ZEBETA) 10 MG tablet, Take 1 tablet (10 mg total) by mouth daily, Disp: 90 tablet, Rfl: 0    calcium carbonate (TUMS EX) 750 mg chewable tablet, Chew 750 mg Three times daily as needed, Disp: , Rfl:     carboxymethylcellulose 0 5 % SOLN, Apply 1 drop to eye 2 (two) times a day as needed, Disp: , Rfl:     Cholecalciferol (VITAMIN D3) 1000 units CAPS, Take 1 tablet by mouth daily, Disp: , Rfl:     ciprofloxacin (CILOXAN) 0 3 % ophthalmic solution, Administer 1 drop into the left eye every other day  , Disp: , Rfl:     doxazosin (CARDURA) 4 mg tablet, Take 1 tablet (4 mg total) by mouth 2 (two) times a day, Disp: 180 tablet, Rfl: 1    ferrous sulfate (FEROSUL) 325 (65 Fe) mg tablet, Take 1 tablet (325 mg total) by mouth 2 (two) times a day with meals, Disp: , Rfl:     furosemide (LASIX) 20 mg tablet, Take 1 tablet (20 mg total) by mouth daily, Disp: 90 tablet, Rfl: 3    furosemide (LASIX) 40 mg tablet, Take 1 tablet (40 mg total) by mouth daily, Disp: 90 tablet, Rfl: 1    levothyroxine 25 mcg tablet, Take 1 tablet (25 mcg total) by mouth daily, Disp: 90 tablet, Rfl: 1    losartan (COZAAR) 25 mg tablet, Take 3 tablets (75 mg total) by mouth 2 (two) times a day, Disp: 180 tablet, Rfl: 5    Multiple Vitamins-Minerals (CENTRUM SILVER 50+WOMEN PO), Take 1 tablet by mouth daily, Disp: , Rfl:     Multiple Vitamins-Minerals (PRESERVISION AREDS PO), Take by mouth daily, Disp: , Rfl:     omeprazole (PriLOSEC) 40 MG capsule, Take 1 capsule (40 mg total) by mouth daily, Disp: 90 capsule, Rfl: 1    polyethylene glycol-propylene glycol (SYSTANE) 0 4-0 3 %, Administer 2 drops to both eyes as needed  , Disp: , Rfl:     potassium chloride (K-DUR,KLOR-CON) 20 mEq tablet, Take 1 tablet (20 mEq total) by mouth 2 (two) times a day, Disp: 90 tablet, Rfl: 1    prednisoLONE acetate (PRED FORTE) 1 % ophthalmic suspension, Administer 1 drop into the left eye every other day  , Disp: , Rfl:     pyridoxine (VITAMIN B6) 100 mg tablet, Take 100 mg by mouth daily  , Disp: , Rfl:     clotrimazole-betamethasone (LOTRISONE) 1-0 05 % cream, Apply topically 2 (two) times a day (Patient not taking: Reported on 1/7/2020), Disp: 30 g, Rfl: 2    Allergies   Allergen Reactions    Latex Rash    Oxycodone Nausea Only, Hallucinations and Other (See Comments)     Hallucinations  Hallucinations  Other reaction(s): Hallucinations  Hallucinations    Oxycodone-Acetaminophen Nausea Only and Hallucinations    Pollen Extract Allergic Rhinitis       Social History   Past Surgical History:   Procedure Laterality Date    COLONOSCOPY      DENTAL SURGERY      EYE SURGERY      GASTRIC RESTRICTION SURGERY      for cancer    JOINT REPLACEMENT      KNEE SURGERY Right 01/2017    TKR    LYMPH NODE BIOPSY      STOMACH SURGERY       Family History   Problem Relation Age of Onset    Heart attack Mother     Diabetes Mother     Heart disease Mother     Prostate cancer Father        Objective:  /68 (BP Location: Left arm, Patient Position: Sitting, Cuff Size: Standard)   Pulse (!) 53   Temp 98 1 °F (36 7 °C) (Oral)   Wt 62 kg (136 lb 9 6 oz)   SpO2 96% Comment: ra  BMI 25 81 kg/m²   Body mass index is 25 81 kg/m²  Physical Exam   Constitutional: She appears well-developed  HENT:   Head: Normocephalic  Right Ear: External ear normal    Left Ear: External ear normal    Mouth/Throat: Oropharynx is clear and moist    Eyes: Pupils are equal, round, and reactive to light  No scleral icterus  Neck: Normal range of motion  Neck supple  No tracheal deviation present  No thyromegaly present  Cardiovascular: Normal rate and regular rhythm  Murmur heard  Pulmonary/Chest: Effort normal and breath sounds normal  No respiratory distress  She exhibits no tenderness  Abdominal: Soft  Bowel sounds are normal  She exhibits no mass  There is no tenderness  Musculoskeletal: Normal range of motion  She exhibits edema  Trace bilateral lower extremity edema present   Lymphadenopathy:     She has no cervical adenopathy  Neurological: She is alert  No cranial nerve deficit  Skin: Skin is warm  Psychiatric: She has a normal mood and affect

## 2020-01-08 DIAGNOSIS — E87.6 HYPOKALEMIA: ICD-10-CM

## 2020-01-08 RX ORDER — POTASSIUM CHLORIDE 20 MEQ/1
TABLET, EXTENDED RELEASE ORAL
Qty: 90 TABLET | Refills: 0 | OUTPATIENT
Start: 2020-01-08

## 2020-01-29 DIAGNOSIS — I10 ESSENTIAL HYPERTENSION: ICD-10-CM

## 2020-01-29 RX ORDER — BISOPROLOL FUMARATE 10 MG/1
TABLET ORAL
Qty: 90 TABLET | Refills: 0 | OUTPATIENT
Start: 2020-01-29

## 2020-02-03 ENCOUNTER — CLINICAL SUPPORT (OUTPATIENT)
Dept: INTERNAL MEDICINE CLINIC | Facility: CLINIC | Age: 85
End: 2020-02-03
Payer: COMMERCIAL

## 2020-02-03 DIAGNOSIS — I10 ESSENTIAL HYPERTENSION: Primary | ICD-10-CM

## 2020-02-03 DIAGNOSIS — I50.9 CHRONIC CONGESTIVE HEART FAILURE, UNSPECIFIED HEART FAILURE TYPE (HCC): ICD-10-CM

## 2020-02-03 DIAGNOSIS — E03.9 HYPOTHYROIDISM, UNSPECIFIED TYPE: ICD-10-CM

## 2020-02-03 LAB
ANION GAP SERPL CALCULATED.3IONS-SCNC: 3 MMOL/L (ref 4–13)
BUN SERPL-MCNC: 24 MG/DL (ref 5–25)
CALCIUM SERPL-MCNC: 9.6 MG/DL (ref 8.3–10.1)
CHLORIDE SERPL-SCNC: 99 MMOL/L (ref 100–108)
CO2 SERPL-SCNC: 31 MMOL/L (ref 21–32)
CREAT SERPL-MCNC: 0.84 MG/DL (ref 0.6–1.3)
ERYTHROCYTE [DISTWIDTH] IN BLOOD BY AUTOMATED COUNT: 14.6 % (ref 11.6–15.1)
GFR SERPL CREATININE-BSD FRML MDRD: 62 ML/MIN/1.73SQ M
GLUCOSE P FAST SERPL-MCNC: 96 MG/DL (ref 65–99)
HCT VFR BLD AUTO: 31.4 % (ref 34.8–46.1)
HGB BLD-MCNC: 10 G/DL (ref 11.5–15.4)
MCH RBC QN AUTO: 28.9 PG (ref 26.8–34.3)
MCHC RBC AUTO-ENTMCNC: 31.8 G/DL (ref 31.4–37.4)
MCV RBC AUTO: 91 FL (ref 82–98)
PLATELET # BLD AUTO: 217 THOUSANDS/UL (ref 149–390)
PMV BLD AUTO: 9.7 FL (ref 8.9–12.7)
POTASSIUM SERPL-SCNC: 4.1 MMOL/L (ref 3.5–5.3)
RBC # BLD AUTO: 3.46 MILLION/UL (ref 3.81–5.12)
SODIUM SERPL-SCNC: 133 MMOL/L (ref 136–145)
TSH SERPL DL<=0.05 MIU/L-ACNC: 3.55 UIU/ML (ref 0.36–3.74)
WBC # BLD AUTO: 4.13 THOUSAND/UL (ref 4.31–10.16)

## 2020-02-03 PROCEDURE — 36415 COLL VENOUS BLD VENIPUNCTURE: CPT

## 2020-02-03 PROCEDURE — 84443 ASSAY THYROID STIM HORMONE: CPT | Performed by: INTERNAL MEDICINE

## 2020-02-03 PROCEDURE — 80048 BASIC METABOLIC PNL TOTAL CA: CPT | Performed by: INTERNAL MEDICINE

## 2020-02-03 PROCEDURE — 85027 COMPLETE CBC AUTOMATED: CPT | Performed by: INTERNAL MEDICINE

## 2020-02-03 RX ORDER — LEVOTHYROXINE SODIUM 0.03 MG/1
TABLET ORAL
Qty: 90 TABLET | Refills: 0 | OUTPATIENT
Start: 2020-02-03

## 2020-02-10 DIAGNOSIS — E03.9 HYPOTHYROIDISM, UNSPECIFIED TYPE: ICD-10-CM

## 2020-02-10 DIAGNOSIS — I10 ESSENTIAL HYPERTENSION: ICD-10-CM

## 2020-02-10 RX ORDER — LEVOTHYROXINE SODIUM 0.03 MG/1
25 TABLET ORAL DAILY
Qty: 90 TABLET | Refills: 1 | Status: SHIPPED | OUTPATIENT
Start: 2020-02-10 | End: 2020-07-15 | Stop reason: SDUPTHER

## 2020-02-10 RX ORDER — BISOPROLOL FUMARATE 10 MG/1
10 TABLET ORAL DAILY
Qty: 90 TABLET | Refills: 1 | Status: SHIPPED | OUTPATIENT
Start: 2020-02-10 | End: 2020-02-18 | Stop reason: SDUPTHER

## 2020-02-13 ENCOUNTER — HOSPITAL ENCOUNTER (OUTPATIENT)
Dept: NON INVASIVE DIAGNOSTICS | Facility: CLINIC | Age: 85
Discharge: HOME | End: 2020-02-13
Payer: COMMERCIAL

## 2020-02-13 DIAGNOSIS — I10 ESSENTIAL HYPERTENSION: ICD-10-CM

## 2020-02-13 DIAGNOSIS — R60.9 EDEMA, UNSPECIFIED TYPE: ICD-10-CM

## 2020-02-13 PROCEDURE — 93306 TTE W/DOPPLER COMPLETE: CPT | Performed by: INTERNAL MEDICINE

## 2020-02-13 PROCEDURE — 93306 TTE W/DOPPLER COMPLETE: CPT

## 2020-02-18 ENCOUNTER — OFFICE VISIT (OUTPATIENT)
Dept: INTERNAL MEDICINE CLINIC | Facility: CLINIC | Age: 85
End: 2020-02-18
Payer: COMMERCIAL

## 2020-02-18 VITALS
TEMPERATURE: 98.6 F | HEART RATE: 55 BPM | BODY MASS INDEX: 26.43 KG/M2 | DIASTOLIC BLOOD PRESSURE: 66 MMHG | HEIGHT: 61 IN | OXYGEN SATURATION: 98 % | WEIGHT: 140 LBS | SYSTOLIC BLOOD PRESSURE: 140 MMHG

## 2020-02-18 DIAGNOSIS — I10 ESSENTIAL HYPERTENSION: ICD-10-CM

## 2020-02-18 DIAGNOSIS — I35.0 NONRHEUMATIC AORTIC VALVE STENOSIS: ICD-10-CM

## 2020-02-18 DIAGNOSIS — E78.2 MIXED HYPERLIPIDEMIA: ICD-10-CM

## 2020-02-18 DIAGNOSIS — E87.6 HYPOKALEMIA: ICD-10-CM

## 2020-02-18 DIAGNOSIS — I50.30 DIASTOLIC CHF WITH PRESERVED LEFT VENTRICULAR FUNCTION, NYHA CLASS 2 (HCC): Primary | ICD-10-CM

## 2020-02-18 PROCEDURE — 4040F PNEUMOC VAC/ADMIN/RCVD: CPT | Performed by: INTERNAL MEDICINE

## 2020-02-18 PROCEDURE — 3008F BODY MASS INDEX DOCD: CPT | Performed by: INTERNAL MEDICINE

## 2020-02-18 PROCEDURE — 3077F SYST BP >= 140 MM HG: CPT | Performed by: INTERNAL MEDICINE

## 2020-02-18 PROCEDURE — 99214 OFFICE O/P EST MOD 30 MIN: CPT | Performed by: INTERNAL MEDICINE

## 2020-02-18 PROCEDURE — 1036F TOBACCO NON-USER: CPT | Performed by: INTERNAL MEDICINE

## 2020-02-18 PROCEDURE — 1160F RVW MEDS BY RX/DR IN RCRD: CPT | Performed by: INTERNAL MEDICINE

## 2020-02-18 PROCEDURE — 3078F DIAST BP <80 MM HG: CPT | Performed by: INTERNAL MEDICINE

## 2020-02-18 RX ORDER — POTASSIUM CHLORIDE 20 MEQ/1
20 TABLET, EXTENDED RELEASE ORAL 2 TIMES DAILY
Qty: 180 TABLET | Refills: 1 | Status: SHIPPED | OUTPATIENT
Start: 2020-02-18 | End: 2020-07-15 | Stop reason: SDUPTHER

## 2020-02-18 RX ORDER — FUROSEMIDE 40 MG/1
40 TABLET ORAL DAILY
Qty: 90 TABLET | Refills: 1 | Status: SHIPPED | OUTPATIENT
Start: 2020-02-18 | End: 2020-07-15 | Stop reason: SDUPTHER

## 2020-02-18 RX ORDER — LOSARTAN POTASSIUM 100 MG/1
TABLET ORAL
COMMUNITY
Start: 2020-01-07 | End: 2020-02-18 | Stop reason: ALTCHOICE

## 2020-02-18 RX ORDER — BISOPROLOL FUMARATE 10 MG/1
10 TABLET ORAL EVERY 12 HOURS SCHEDULED
Qty: 180 TABLET | Refills: 1 | Status: SHIPPED | OUTPATIENT
Start: 2020-02-18 | End: 2020-07-15 | Stop reason: SDUPTHER

## 2020-02-18 NOTE — PROGRESS NOTES
Assessment/Plan:    1  Chronic diastolic of congestive heart failure  Echocardiogram reviewed with patient and her daughter  Will increase her bisoprolol 10 mg b i d     Will continue with present dose of Lasix 60 mg along with the potassium supplementation  Will check electrolytes before next visit    2  Hypothyroid  Will continue with present dose of levothyroxine  3  Essential hypertension  Will continue with present regimen except that which change bisoprolol 10 mg twice a day today  Diagnoses and all orders for this visit:    Diastolic CHF with preserved left ventricular function, NYHA class 2 (HCC)  -     CBC; Future  -     Basic metabolic panel; Future    Hypokalemia  -     potassium chloride (K-DUR,KLOR-CON) 20 mEq tablet; Take 1 tablet (20 mEq total) by mouth 2 (two) times a day  -     Basic metabolic panel; Future    Mixed hyperlipidemia    Essential hypertension  -     bisoprolol (ZEBETA) 10 MG tablet; Take 1 tablet (10 mg total) by mouth every 12 (twelve) hours  -     furosemide (LASIX) 40 mg tablet; Take 1 tablet (40 mg total) by mouth daily  -     CBC; Future  -     Basic metabolic panel; Future    Nonrheumatic aortic valve stenosis    Other orders  -     Discontinue: losartan (COZAAR) 100 MG tablet               Subjective:          Patient ID: Mindy Sampson is a 80 y o  female  Patient is here for follow-up after echocardiogram   Her shortness of breath is better  Lower extremity edema is better  No chest pain  The following portions of the patient's history were reviewed and updated as appropriate: allergies, current medications, past family history, past medical history, past social history, past surgical history and problem list     Review of Systems   Constitutional: Negative for fatigue and fever  HENT: Negative for congestion, ear discharge, ear pain, postnasal drip, sinus pressure, sore throat, tinnitus and trouble swallowing      Eyes: Negative for discharge, itching and visual disturbance  Respiratory: Negative for cough and shortness of breath  Cardiovascular: Positive for leg swelling  Negative for chest pain and palpitations  Gastrointestinal: Negative for abdominal pain, diarrhea, nausea and vomiting  Endocrine: Negative for cold intolerance and polyuria  Genitourinary: Negative for difficulty urinating, dysuria and urgency  Musculoskeletal: Negative for arthralgias and neck pain  Skin: Negative for rash  Allergic/Immunologic: Negative for environmental allergies  Neurological: Negative for dizziness, weakness and headaches  Psychiatric/Behavioral: The patient is not nervous/anxious            Past Medical History:   Diagnosis Date    Allergic     Anemia     Arthritis     [place] ([place] [address] )     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[place] [address] )     Disease of thyroid gland     Fatigue     last assessed 8/5/15; resolved 9/30/16    Gastric cancer ([place] [address] ) 2013    GERD (gastroesophageal reflux disease)     Hyperlipidemia     Hypertension     Osteoporosis     Senile cataract     last assessed 10/18/13; resolved 2/6/15    Situational depression     last assessed 2/11/15; resolved 7/20/15    Visual impairment          Current Outpatient Medications:     acetaminophen (TYLENOL) 500 mg tablet, Take 1 tablet by mouth daily  , Disp: , Rfl:     albuterol (VENTOLIN HFA) 90 mcg/act inhaler, Inhale 2 puffs every 6 (six) hours as needed for wheezing, Disp: 1 Inhaler, Rfl: 3    amLODIPine (NORVASC) 5 mg tablet, Take 1 tablet (5 mg total) by mouth daily, Disp: 90 tablet, Rfl: 2    bisoprolol (ZEBETA) 10 MG tablet, Take 1 tablet (10 mg total) by mouth every 12 (twelve) hours, Disp: 180 tablet, Rfl: 1    calcium carbonate (TUMS EX) 750 mg chewable tablet, Chew 750 mg Three times daily as needed, Disp: , Rfl:     carboxymethylcellulose 0 5 % SOLN, Apply 1 drop to eye 2 (two) times a day as needed, Disp: , Rfl:     Cholecalciferol (VITAMIN D3) 1000 units CAPS, Take 1 tablet by mouth daily, Disp: , Rfl:     ciprofloxacin (CILOXAN) 0 3 % ophthalmic solution, Administer 1 drop into the left eye every other day  , Disp: , Rfl:     doxazosin (CARDURA) 4 mg tablet, Take 1 tablet (4 mg total) by mouth 2 (two) times a day, Disp: 180 tablet, Rfl: 1    ferrous sulfate (FEROSUL) 325 (65 Fe) mg tablet, Take 1 tablet (325 mg total) by mouth 2 (two) times a day with meals, Disp: , Rfl:     furosemide (LASIX) 20 mg tablet, Take 1 tablet (20 mg total) by mouth daily, Disp: 90 tablet, Rfl: 3    furosemide (LASIX) 40 mg tablet, Take 1 tablet (40 mg total) by mouth daily, Disp: 90 tablet, Rfl: 1    levothyroxine 25 mcg tablet, Take 1 tablet (25 mcg total) by mouth daily, Disp: 90 tablet, Rfl: 1    losartan (COZAAR) 25 mg tablet, Take 3 tablets (75 mg total) by mouth 2 (two) times a day, Disp: 180 tablet, Rfl: 5    Multiple Vitamins-Minerals (CENTRUM SILVER 50+WOMEN PO), Take 1 tablet by mouth daily, Disp: , Rfl:     Multiple Vitamins-Minerals (PRESERVISION AREDS PO), Take by mouth daily, Disp: , Rfl:     omeprazole (PriLOSEC) 40 MG capsule, Take 1 capsule (40 mg total) by mouth daily, Disp: 90 capsule, Rfl: 1    polyethylene glycol-propylene glycol (SYSTANE) 0 4-0 3 %, Administer 2 drops to both eyes as needed  , Disp: , Rfl:     prednisoLONE acetate (PRED FORTE) 1 % ophthalmic suspension, Administer 1 drop into the left eye every other day  , Disp: , Rfl:     pyridoxine (VITAMIN B6) 100 mg tablet, Take 100 mg by mouth daily  , Disp: , Rfl:     clotrimazole-betamethasone (LOTRISONE) 1-0 05 % cream, Apply topically 2 (two) times a day (Patient not taking: Reported on 1/7/2020), Disp: 30 g, Rfl: 2    potassium chloride (K-DUR,KLOR-CON) 20 mEq tablet, Take 1 tablet (20 mEq total) by mouth 2 (two) times a day, Disp: 180 tablet, Rfl: 1    Allergies   Allergen Reactions    Latex Rash    Oxycodone Nausea Only, Hallucinations and Other (See Comments)     Hallucinations  Hallucinations  Other reaction(s): Hallucinations  Hallucinations  Hallucinations  Other reaction(s): Hallucinations  Hallucinations  Other reaction(s): Hallucinations, Other (See Comments)  Hallucinations  Hallucinations  Other reaction(s): Hallucinations  Hallucinations    Oxycodone-Acetaminophen Nausea Only and Hallucinations    Pollen Extract Allergic Rhinitis       Social History   Past Surgical History:   Procedure Laterality Date    COLONOSCOPY      DENTAL SURGERY      EYE SURGERY      GASTRIC RESTRICTION SURGERY      for cancer    JOINT REPLACEMENT      KNEE SURGERY Right 01/2017    TKR    LYMPH NODE BIOPSY      STOMACH SURGERY       Family History   Problem Relation Age of Onset    Heart attack Mother     Diabetes Mother     Heart disease Mother     Prostate cancer Father        Objective:  /66 (BP Location: Left arm, Patient Position: Sitting, Cuff Size: Adult)   Pulse 55   Temp 98 6 °F (37 °C) (Oral)   Ht 5' 1" (1 549 m)   Wt 63 5 kg (140 lb) Comment: w shoes  SpO2 98% Comment: ra  BMI 26 45 kg/m²   Body mass index is 26 45 kg/m²  Physical Exam   Constitutional: She appears well-developed  HENT:   Head: Normocephalic  Right Ear: External ear normal    Left Ear: External ear normal    Mouth/Throat: Oropharynx is clear and moist    Eyes: Pupils are equal, round, and reactive to light  No scleral icterus  Neck: Normal range of motion  Neck supple  No tracheal deviation present  No thyromegaly present  Cardiovascular: Normal rate and regular rhythm  Murmur heard  Trace to 1+ lower extremity edema present  Better than previous examination  Pulmonary/Chest: Effort normal and breath sounds normal  No respiratory distress  She exhibits no tenderness  Abdominal: Soft  Bowel sounds are normal  She exhibits no mass  There is no tenderness  Musculoskeletal: Normal range of motion  She exhibits edema  Lymphadenopathy:     She has no cervical adenopathy  Neurological: She is alert   No cranial nerve deficit  Coordination normal    Skin: Skin is warm and dry  No erythema  Psychiatric: She has a normal mood and affect

## 2020-03-16 DIAGNOSIS — Z85.028 HISTORY OF GASTRIC CANCER: ICD-10-CM

## 2020-03-17 RX ORDER — OMEPRAZOLE 40 MG/1
CAPSULE, DELAYED RELEASE ORAL
Qty: 90 CAPSULE | Refills: 1 | Status: SHIPPED | OUTPATIENT
Start: 2020-03-17 | End: 2020-06-08

## 2020-04-03 DIAGNOSIS — I10 ESSENTIAL HYPERTENSION: ICD-10-CM

## 2020-04-03 RX ORDER — DOXAZOSIN MESYLATE 4 MG/1
TABLET ORAL
Qty: 180 TABLET | Refills: 1 | Status: SHIPPED | OUTPATIENT
Start: 2020-04-03 | End: 2020-06-03 | Stop reason: ALTCHOICE

## 2020-05-09 DIAGNOSIS — E03.9 HYPOTHYROIDISM, UNSPECIFIED TYPE: ICD-10-CM

## 2020-05-11 RX ORDER — LEVOTHYROXINE SODIUM 0.03 MG/1
TABLET ORAL
Qty: 90 TABLET | Refills: 1 | OUTPATIENT
Start: 2020-05-11

## 2020-05-16 DIAGNOSIS — I10 ESSENTIAL HYPERTENSION: ICD-10-CM

## 2020-05-18 RX ORDER — AMLODIPINE BESYLATE 5 MG/1
TABLET ORAL
Qty: 90 TABLET | Refills: 2 | OUTPATIENT
Start: 2020-05-18

## 2020-05-20 ENCOUNTER — TELEMEDICINE (OUTPATIENT)
Dept: INTERNAL MEDICINE CLINIC | Age: 85
End: 2020-05-20
Payer: COMMERCIAL

## 2020-05-20 DIAGNOSIS — K21.9 GASTROESOPHAGEAL REFLUX DISEASE WITHOUT ESOPHAGITIS: Primary | ICD-10-CM

## 2020-05-20 DIAGNOSIS — I50.9 CHRONIC CONGESTIVE HEART FAILURE, UNSPECIFIED HEART FAILURE TYPE (HCC): ICD-10-CM

## 2020-05-20 DIAGNOSIS — E03.9 HYPOTHYROIDISM, UNSPECIFIED TYPE: ICD-10-CM

## 2020-05-20 DIAGNOSIS — D50.9 IRON DEFICIENCY ANEMIA, UNSPECIFIED IRON DEFICIENCY ANEMIA TYPE: ICD-10-CM

## 2020-05-20 DIAGNOSIS — M17.0 OSTEOARTHRITIS OF BOTH KNEES, UNSPECIFIED OSTEOARTHRITIS TYPE: ICD-10-CM

## 2020-05-20 PROCEDURE — 1100F PTFALLS ASSESS-DOCD GE2>/YR: CPT | Performed by: INTERNAL MEDICINE

## 2020-05-20 PROCEDURE — 3288F FALL RISK ASSESSMENT DOCD: CPT | Performed by: INTERNAL MEDICINE

## 2020-05-20 PROCEDURE — 99214 OFFICE O/P EST MOD 30 MIN: CPT | Performed by: INTERNAL MEDICINE

## 2020-05-20 PROCEDURE — 1160F RVW MEDS BY RX/DR IN RCRD: CPT | Performed by: INTERNAL MEDICINE

## 2020-05-27 ENCOUNTER — OFFICE VISIT (OUTPATIENT)
Dept: INTERNAL MEDICINE CLINIC | Age: 85
End: 2020-05-27
Payer: COMMERCIAL

## 2020-05-27 VITALS
BODY MASS INDEX: 24.94 KG/M2 | WEIGHT: 132 LBS | HEART RATE: 53 BPM | SYSTOLIC BLOOD PRESSURE: 100 MMHG | TEMPERATURE: 98.6 F | DIASTOLIC BLOOD PRESSURE: 50 MMHG | OXYGEN SATURATION: 96 %

## 2020-05-27 DIAGNOSIS — E03.9 HYPOTHYROIDISM, UNSPECIFIED TYPE: ICD-10-CM

## 2020-05-27 DIAGNOSIS — D50.9 IRON DEFICIENCY ANEMIA, UNSPECIFIED IRON DEFICIENCY ANEMIA TYPE: ICD-10-CM

## 2020-05-27 DIAGNOSIS — I10 ESSENTIAL HYPERTENSION: Primary | ICD-10-CM

## 2020-05-27 DIAGNOSIS — Z00.00 MEDICARE ANNUAL WELLNESS VISIT, SUBSEQUENT: ICD-10-CM

## 2020-05-27 PROCEDURE — 1036F TOBACCO NON-USER: CPT | Performed by: INTERNAL MEDICINE

## 2020-05-27 PROCEDURE — 3074F SYST BP LT 130 MM HG: CPT | Performed by: INTERNAL MEDICINE

## 2020-05-27 PROCEDURE — 4040F PNEUMOC VAC/ADMIN/RCVD: CPT | Performed by: INTERNAL MEDICINE

## 2020-05-27 PROCEDURE — 3078F DIAST BP <80 MM HG: CPT | Performed by: INTERNAL MEDICINE

## 2020-05-27 PROCEDURE — 1170F FXNL STATUS ASSESSED: CPT | Performed by: INTERNAL MEDICINE

## 2020-05-27 PROCEDURE — 1160F RVW MEDS BY RX/DR IN RCRD: CPT | Performed by: INTERNAL MEDICINE

## 2020-05-27 PROCEDURE — 1125F AMNT PAIN NOTED PAIN PRSNT: CPT | Performed by: INTERNAL MEDICINE

## 2020-05-27 PROCEDURE — 99214 OFFICE O/P EST MOD 30 MIN: CPT | Performed by: INTERNAL MEDICINE

## 2020-05-27 PROCEDURE — G0439 PPPS, SUBSEQ VISIT: HCPCS | Performed by: INTERNAL MEDICINE

## 2020-06-03 ENCOUNTER — OFFICE VISIT (OUTPATIENT)
Dept: INTERNAL MEDICINE CLINIC | Age: 85
End: 2020-06-03
Payer: COMMERCIAL

## 2020-06-03 VITALS
DIASTOLIC BLOOD PRESSURE: 58 MMHG | SYSTOLIC BLOOD PRESSURE: 124 MMHG | HEIGHT: 61 IN | OXYGEN SATURATION: 97 % | HEART RATE: 52 BPM | WEIGHT: 131.6 LBS | TEMPERATURE: 97.6 F | BODY MASS INDEX: 24.84 KG/M2

## 2020-06-03 DIAGNOSIS — E03.9 HYPOTHYROIDISM, UNSPECIFIED TYPE: ICD-10-CM

## 2020-06-03 DIAGNOSIS — D50.9 IRON DEFICIENCY ANEMIA, UNSPECIFIED IRON DEFICIENCY ANEMIA TYPE: ICD-10-CM

## 2020-06-03 DIAGNOSIS — I50.30 DIASTOLIC CHF WITH PRESERVED LEFT VENTRICULAR FUNCTION, NYHA CLASS 2 (HCC): ICD-10-CM

## 2020-06-03 DIAGNOSIS — I10 ESSENTIAL HYPERTENSION: Primary | ICD-10-CM

## 2020-06-03 PROCEDURE — 4040F PNEUMOC VAC/ADMIN/RCVD: CPT | Performed by: INTERNAL MEDICINE

## 2020-06-03 PROCEDURE — 1036F TOBACCO NON-USER: CPT | Performed by: INTERNAL MEDICINE

## 2020-06-03 PROCEDURE — 3078F DIAST BP <80 MM HG: CPT | Performed by: INTERNAL MEDICINE

## 2020-06-03 PROCEDURE — 3008F BODY MASS INDEX DOCD: CPT | Performed by: INTERNAL MEDICINE

## 2020-06-03 PROCEDURE — 99214 OFFICE O/P EST MOD 30 MIN: CPT | Performed by: INTERNAL MEDICINE

## 2020-06-03 PROCEDURE — 3074F SYST BP LT 130 MM HG: CPT | Performed by: INTERNAL MEDICINE

## 2020-06-03 PROCEDURE — 1170F FXNL STATUS ASSESSED: CPT | Performed by: INTERNAL MEDICINE

## 2020-06-03 PROCEDURE — 1160F RVW MEDS BY RX/DR IN RCRD: CPT | Performed by: INTERNAL MEDICINE

## 2020-06-07 DIAGNOSIS — Z85.028 HISTORY OF GASTRIC CANCER: ICD-10-CM

## 2020-06-08 RX ORDER — OMEPRAZOLE 40 MG/1
CAPSULE, DELAYED RELEASE ORAL
Qty: 90 CAPSULE | Refills: 1 | Status: SHIPPED | OUTPATIENT
Start: 2020-06-08 | End: 2020-12-01 | Stop reason: SDUPTHER

## 2020-07-08 DIAGNOSIS — I10 ESSENTIAL HYPERTENSION: ICD-10-CM

## 2020-07-08 RX ORDER — LOSARTAN POTASSIUM 25 MG/1
TABLET ORAL
Qty: 180 TABLET | Refills: 5 | OUTPATIENT
Start: 2020-07-08

## 2020-07-13 ENCOUNTER — CLINICAL SUPPORT (OUTPATIENT)
Dept: INTERNAL MEDICINE CLINIC | Facility: CLINIC | Age: 85
End: 2020-07-13
Payer: COMMERCIAL

## 2020-07-13 DIAGNOSIS — I10 ESSENTIAL HYPERTENSION: Primary | ICD-10-CM

## 2020-07-13 DIAGNOSIS — I50.30 DIASTOLIC CHF WITH PRESERVED LEFT VENTRICULAR FUNCTION, NYHA CLASS 2 (HCC): ICD-10-CM

## 2020-07-13 DIAGNOSIS — E03.9 HYPOTHYROIDISM, UNSPECIFIED TYPE: ICD-10-CM

## 2020-07-13 DIAGNOSIS — E87.6 HYPOKALEMIA: ICD-10-CM

## 2020-07-13 LAB
ANION GAP SERPL CALCULATED.3IONS-SCNC: 5 MMOL/L (ref 4–13)
BUN SERPL-MCNC: 31 MG/DL (ref 5–25)
CALCIUM SERPL-MCNC: 9.5 MG/DL (ref 8.3–10.1)
CHLORIDE SERPL-SCNC: 96 MMOL/L (ref 100–108)
CO2 SERPL-SCNC: 32 MMOL/L (ref 21–32)
CREAT SERPL-MCNC: 0.99 MG/DL (ref 0.6–1.3)
ERYTHROCYTE [DISTWIDTH] IN BLOOD BY AUTOMATED COUNT: 14.2 % (ref 11.6–15.1)
GFR SERPL CREATININE-BSD FRML MDRD: 51 ML/MIN/1.73SQ M
GLUCOSE P FAST SERPL-MCNC: 90 MG/DL (ref 65–99)
HCT VFR BLD AUTO: 35.6 % (ref 34.8–46.1)
HGB BLD-MCNC: 11.2 G/DL (ref 11.5–15.4)
MCH RBC QN AUTO: 29.5 PG (ref 26.8–34.3)
MCHC RBC AUTO-ENTMCNC: 31.5 G/DL (ref 31.4–37.4)
MCV RBC AUTO: 94 FL (ref 82–98)
PLATELET # BLD AUTO: 235 THOUSANDS/UL (ref 149–390)
PMV BLD AUTO: 9.7 FL (ref 8.9–12.7)
POTASSIUM SERPL-SCNC: 4.4 MMOL/L (ref 3.5–5.3)
RBC # BLD AUTO: 3.8 MILLION/UL (ref 3.81–5.12)
SODIUM SERPL-SCNC: 133 MMOL/L (ref 136–145)
TSH SERPL DL<=0.05 MIU/L-ACNC: 2.69 UIU/ML (ref 0.36–3.74)
WBC # BLD AUTO: 6.08 THOUSAND/UL (ref 4.31–10.16)

## 2020-07-13 PROCEDURE — 80048 BASIC METABOLIC PNL TOTAL CA: CPT | Performed by: INTERNAL MEDICINE

## 2020-07-13 PROCEDURE — 85027 COMPLETE CBC AUTOMATED: CPT | Performed by: INTERNAL MEDICINE

## 2020-07-13 PROCEDURE — 36415 COLL VENOUS BLD VENIPUNCTURE: CPT

## 2020-07-13 PROCEDURE — 84443 ASSAY THYROID STIM HORMONE: CPT | Performed by: INTERNAL MEDICINE

## 2020-07-15 ENCOUNTER — OFFICE VISIT (OUTPATIENT)
Dept: INTERNAL MEDICINE CLINIC | Age: 85
End: 2020-07-15
Payer: COMMERCIAL

## 2020-07-15 VITALS
HEIGHT: 61 IN | WEIGHT: 130.4 LBS | OXYGEN SATURATION: 98 % | SYSTOLIC BLOOD PRESSURE: 140 MMHG | TEMPERATURE: 98.3 F | BODY MASS INDEX: 24.62 KG/M2 | DIASTOLIC BLOOD PRESSURE: 60 MMHG | HEART RATE: 50 BPM

## 2020-07-15 DIAGNOSIS — F32.A DEPRESSION, UNSPECIFIED DEPRESSION TYPE: ICD-10-CM

## 2020-07-15 DIAGNOSIS — E03.9 HYPOTHYROIDISM, UNSPECIFIED TYPE: ICD-10-CM

## 2020-07-15 DIAGNOSIS — I10 ESSENTIAL HYPERTENSION: ICD-10-CM

## 2020-07-15 DIAGNOSIS — E87.6 HYPOKALEMIA: ICD-10-CM

## 2020-07-15 DIAGNOSIS — K21.9 GASTROESOPHAGEAL REFLUX DISEASE WITHOUT ESOPHAGITIS: Primary | ICD-10-CM

## 2020-07-15 PROCEDURE — 3008F BODY MASS INDEX DOCD: CPT | Performed by: INTERNAL MEDICINE

## 2020-07-15 PROCEDURE — 99214 OFFICE O/P EST MOD 30 MIN: CPT | Performed by: INTERNAL MEDICINE

## 2020-07-15 PROCEDURE — 3078F DIAST BP <80 MM HG: CPT | Performed by: INTERNAL MEDICINE

## 2020-07-15 PROCEDURE — 4040F PNEUMOC VAC/ADMIN/RCVD: CPT | Performed by: INTERNAL MEDICINE

## 2020-07-15 PROCEDURE — 3077F SYST BP >= 140 MM HG: CPT | Performed by: INTERNAL MEDICINE

## 2020-07-15 PROCEDURE — 1036F TOBACCO NON-USER: CPT | Performed by: INTERNAL MEDICINE

## 2020-07-15 PROCEDURE — 1160F RVW MEDS BY RX/DR IN RCRD: CPT | Performed by: INTERNAL MEDICINE

## 2020-07-15 RX ORDER — ESCITALOPRAM OXALATE 5 MG/1
5 TABLET ORAL DAILY
Qty: 90 TABLET | Refills: 3 | Status: SHIPPED | OUTPATIENT
Start: 2020-07-15 | End: 2020-12-01 | Stop reason: SDUPTHER

## 2020-07-15 RX ORDER — FUROSEMIDE 40 MG/1
40 TABLET ORAL DAILY
Qty: 90 TABLET | Refills: 1 | Status: SHIPPED | OUTPATIENT
Start: 2020-07-15 | End: 2020-12-01 | Stop reason: SDUPTHER

## 2020-07-15 RX ORDER — POTASSIUM CHLORIDE 20 MEQ/1
20 TABLET, EXTENDED RELEASE ORAL 2 TIMES DAILY
Qty: 180 TABLET | Refills: 1 | Status: SHIPPED | OUTPATIENT
Start: 2020-07-15 | End: 2020-12-01 | Stop reason: SDUPTHER

## 2020-07-15 RX ORDER — AMLODIPINE BESYLATE 5 MG/1
5 TABLET ORAL DAILY
Qty: 90 TABLET | Refills: 2 | Status: SHIPPED | OUTPATIENT
Start: 2020-07-15 | End: 2020-12-01 | Stop reason: SDUPTHER

## 2020-07-15 RX ORDER — BISOPROLOL FUMARATE 10 MG/1
10 TABLET ORAL EVERY 12 HOURS SCHEDULED
Qty: 180 TABLET | Refills: 1 | Status: SHIPPED | OUTPATIENT
Start: 2020-07-15 | End: 2020-12-01 | Stop reason: SDUPTHER

## 2020-07-15 RX ORDER — LEVOTHYROXINE SODIUM 0.03 MG/1
25 TABLET ORAL DAILY
Qty: 90 TABLET | Refills: 1 | Status: SHIPPED | OUTPATIENT
Start: 2020-07-15 | End: 2020-12-01 | Stop reason: SDUPTHER

## 2020-07-15 RX ORDER — LOSARTAN POTASSIUM 25 MG/1
75 TABLET ORAL DAILY
Qty: 270 TABLET | Refills: 1 | Status: SHIPPED | OUTPATIENT
Start: 2020-07-15 | End: 2020-12-01 | Stop reason: SDUPTHER

## 2020-07-15 NOTE — PROGRESS NOTES
Assessment/Plan:    1  Depression  Will start patient on Lexapro 5 mg daily  Also discussed with daughter to involve her more social activities if possible  2  Hypothyroid  Thyroid function is within normal range  Will continue with levothyroxine 25 mcg daily    3   diastolic CHF  Stable  Will continue with present regimen  Will check electrolytes before next visit    4  Mild hyponatremia  Chronic in nature  Asymptomatic  Will continue to monitor     Diagnoses and all orders for this visit:    Gastroesophageal reflux disease without esophagitis    Essential hypertension  -     amLODIPine (NORVASC) 5 mg tablet; Take 1 tablet (5 mg total) by mouth daily  -     bisoprolol (ZEBETA) 10 MG tablet; Take 1 tablet (10 mg total) by mouth every 12 (twelve) hours  -     furosemide (LASIX) 40 mg tablet; Take 1 tablet (40 mg total) by mouth daily  -     losartan (COZAAR) 25 mg tablet; Take 3 tablets (75 mg total) by mouth daily  -     Basic metabolic panel; Future  -     CBC; Future    Hypokalemia  -     potassium chloride (K-DUR,KLOR-CON) 20 mEq tablet; Take 1 tablet (20 mEq total) by mouth 2 (two) times a day  -     Basic metabolic panel; Future    Hypothyroidism, unspecified type  -     levothyroxine 25 mcg tablet; Take 1 tablet (25 mcg total) by mouth daily    Depression, unspecified depression type  -     escitalopram (LEXAPRO) 5 mg tablet; Take 1 tablet (5 mg total) by mouth daily               Subjective:          Patient ID: Beatris Stanley is a 80 y o  female  Patient is here for regular follow-up  Does have blood work done last week and would like to discuss results  As per daughter patient appears more depressed and down most of the time also have poor appetite  Patient herself also noticed that she is not up to her usual social activity    Denied any other complaint      The following portions of the patient's history were reviewed and updated as appropriate: allergies, current medications, past family history, past medical history, past social history, past surgical history and problem list     Review of Systems   Constitutional: Negative for fatigue and fever  HENT: Negative for congestion, ear discharge, ear pain, postnasal drip, sinus pressure, sore throat, tinnitus and trouble swallowing  Eyes: Negative for discharge, itching and visual disturbance  Respiratory: Negative for cough and shortness of breath  Cardiovascular: Negative for chest pain and palpitations  Gastrointestinal: Negative for abdominal pain, diarrhea, nausea and vomiting  Endocrine: Negative for cold intolerance and polyuria  Genitourinary: Negative for difficulty urinating, dysuria and urgency  Musculoskeletal: Negative for arthralgias and neck pain  Skin: Negative for rash  Allergic/Immunologic: Negative for environmental allergies  Neurological: Negative for dizziness, weakness and headaches  Psychiatric/Behavioral: Negative for agitation, behavioral problems and confusion  The patient is not nervous/anxious            Past Medical History:   Diagnosis Date    Allergic     Anemia     Arthritis     [place] ([place] [address] )     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[place] [address] )     Depression 7/15/2020    Disease of thyroid gland     Fatigue     last assessed 8/5/15; resolved 9/30/16    Gastric cancer ([place] [address] ) 2013    GERD (gastroesophageal reflux disease)     Hyperlipidemia     Hypertension     Osteoporosis     Senile cataract     last assessed 10/18/13; resolved 2/6/15    Situational depression     last assessed 2/11/15; resolved 7/20/15    Visual impairment          Current Outpatient Medications:     acetaminophen (TYLENOL) 500 mg tablet, Take 1 tablet by mouth daily  , Disp: , Rfl:     albuterol (VENTOLIN HFA) 90 mcg/act inhaler, Inhale 2 puffs every 6 (six) hours as needed for wheezing, Disp: 1 Inhaler, Rfl: 3    amLODIPine (NORVASC) 5 mg tablet, Take 1 tablet (5 mg total) by mouth daily, Disp: 90 tablet, Rfl: 2    bisoprolol (ZEBETA) 10 MG tablet, Take 1 tablet (10 mg total) by mouth every 12 (twelve) hours, Disp: 180 tablet, Rfl: 1    calcium carbonate (TUMS EX) 750 mg chewable tablet, Chew 750 mg Three times daily as needed, Disp: , Rfl:     carboxymethylcellulose 0 5 % SOLN, Apply 1 drop to eye 2 (two) times a day as needed, Disp: , Rfl:     Cholecalciferol (VITAMIN D3) 1000 units CAPS, Take 1 tablet by mouth daily, Disp: , Rfl:     ciprofloxacin (CILOXAN) 0 3 % ophthalmic solution, Administer 1 drop into the left eye every other day  , Disp: , Rfl:     clotrimazole-betamethasone (LOTRISONE) 1-0 05 % cream, Apply topically 2 (two) times a day, Disp: 30 g, Rfl: 2    ferrous sulfate (FEROSUL) 325 (65 Fe) mg tablet, Take 1 tablet (325 mg total) by mouth 2 (two) times a day with meals, Disp: , Rfl:     furosemide (LASIX) 40 mg tablet, Take 1 tablet (40 mg total) by mouth daily, Disp: 90 tablet, Rfl: 1    levothyroxine 25 mcg tablet, Take 1 tablet (25 mcg total) by mouth daily, Disp: 90 tablet, Rfl: 1    losartan (COZAAR) 25 mg tablet, Take 3 tablets (75 mg total) by mouth daily, Disp: 270 tablet, Rfl: 1    Multiple Vitamins-Minerals (CENTRUM SILVER 50+WOMEN PO), Take 1 tablet by mouth daily, Disp: , Rfl:     omeprazole (PriLOSEC) 40 MG capsule, take 1 capsule by mouth daily, Disp: 90 capsule, Rfl: 1    polyethylene glycol-propylene glycol (SYSTANE) 0 4-0 3 %, Administer 2 drops to both eyes as needed  , Disp: , Rfl:     potassium chloride (K-DUR,KLOR-CON) 20 mEq tablet, Take 1 tablet (20 mEq total) by mouth 2 (two) times a day, Disp: 180 tablet, Rfl: 1    prednisoLONE acetate (PRED FORTE) 1 % ophthalmic suspension, Administer 1 drop into the left eye every other day  , Disp: , Rfl:     pyridoxine (VITAMIN B6) 100 mg tablet, Take 100 mg by mouth daily  , Disp: , Rfl:     escitalopram (LEXAPRO) 5 mg tablet, Take 1 tablet (5 mg total) by mouth daily, Disp: 90 tablet, Rfl: 3    furosemide (LASIX) 20 mg tablet, Take 1 tablet (20 mg total) by mouth daily (Patient not taking: Reported on 7/15/2020), Disp: 90 tablet, Rfl: 3    Allergies   Allergen Reactions    Latex Rash    Oxycodone Nausea Only, Hallucinations and Other (See Comments)     Hallucinations  Hallucinations  Other reaction(s): Hallucinations  Hallucinations  Hallucinations  Other reaction(s): Hallucinations  Hallucinations  Other reaction(s): Hallucinations, Other (See Comments)  Hallucinations  Hallucinations  Other reaction(s): Hallucinations  Hallucinations    Oxycodone-Acetaminophen Nausea Only and Hallucinations    Pollen Extract Allergic Rhinitis       Social History   Past Surgical History:   Procedure Laterality Date    COLONOSCOPY      DENTAL SURGERY      EYE SURGERY      GASTRIC RESTRICTION SURGERY      for cancer    JOINT REPLACEMENT      KNEE SURGERY Right 01/2017    TKR    LYMPH NODE BIOPSY      STOMACH SURGERY       Family History   Problem Relation Age of Onset    Heart attack Mother     Diabetes Mother     Heart disease Mother     Prostate cancer Father        Objective:  /50 (BP Location: Left arm, Patient Position: Sitting, Cuff Size: Adult)   Pulse (!) 50   Temp 98 3 °F (36 8 °C) (Temporal)   Ht 5' 1" (1 549 m)   Wt 59 1 kg (130 lb 6 4 oz)   SpO2 98%   BMI 24 64 kg/m²   Body mass index is 24 64 kg/m²  Physical Exam   Constitutional: She appears well-developed  HENT:   Head: Normocephalic  Mouth/Throat: Oropharynx is clear and moist    Eyes: Pupils are equal, round, and reactive to light  No scleral icterus  Neck: Normal range of motion  Neck supple  No tracheal deviation present  No thyromegaly present  Cardiovascular: Normal rate and regular rhythm  Murmur heard  Trace lower extremity edema present   Pulmonary/Chest: Effort normal and breath sounds normal  No respiratory distress  She exhibits no tenderness  Abdominal: Soft  Bowel sounds are normal  She exhibits no mass  There is no tenderness     Musculoskeletal: Normal range of motion  She exhibits edema  Lymphadenopathy:     She has no cervical adenopathy  Neurological: She is alert  No cranial nerve deficit  Skin: Skin is warm and dry  Psychiatric: She has a normal mood and affect   Her behavior is normal  Thought content normal

## 2020-09-14 ENCOUNTER — APPOINTMENT (OUTPATIENT)
Dept: LAB | Facility: IMAGING CENTER | Age: 85
End: 2020-09-14
Payer: COMMERCIAL

## 2020-09-14 DIAGNOSIS — I10 ESSENTIAL HYPERTENSION: ICD-10-CM

## 2020-09-14 DIAGNOSIS — E87.6 HYPOKALEMIA: ICD-10-CM

## 2020-09-14 LAB
ANION GAP SERPL CALCULATED.3IONS-SCNC: 8 MMOL/L (ref 4–13)
BUN SERPL-MCNC: 31 MG/DL (ref 5–25)
CALCIUM SERPL-MCNC: 9.8 MG/DL (ref 8.3–10.1)
CHLORIDE SERPL-SCNC: 100 MMOL/L (ref 100–108)
CO2 SERPL-SCNC: 29 MMOL/L (ref 21–32)
CREAT SERPL-MCNC: 1.03 MG/DL (ref 0.6–1.3)
ERYTHROCYTE [DISTWIDTH] IN BLOOD BY AUTOMATED COUNT: 14 % (ref 11.6–15.1)
GFR SERPL CREATININE-BSD FRML MDRD: 48 ML/MIN/1.73SQ M
GLUCOSE P FAST SERPL-MCNC: 91 MG/DL (ref 65–99)
HCT VFR BLD AUTO: 36.3 % (ref 34.8–46.1)
HGB BLD-MCNC: 11.7 G/DL (ref 11.5–15.4)
MCH RBC QN AUTO: 30.5 PG (ref 26.8–34.3)
MCHC RBC AUTO-ENTMCNC: 32.2 G/DL (ref 31.4–37.4)
MCV RBC AUTO: 95 FL (ref 82–98)
PLATELET # BLD AUTO: 220 THOUSANDS/UL (ref 149–390)
PMV BLD AUTO: 10.2 FL (ref 8.9–12.7)
POTASSIUM SERPL-SCNC: 4.2 MMOL/L (ref 3.5–5.3)
RBC # BLD AUTO: 3.83 MILLION/UL (ref 3.81–5.12)
SODIUM SERPL-SCNC: 137 MMOL/L (ref 136–145)
WBC # BLD AUTO: 6.37 THOUSAND/UL (ref 4.31–10.16)

## 2020-09-14 PROCEDURE — 80048 BASIC METABOLIC PNL TOTAL CA: CPT

## 2020-09-14 PROCEDURE — 85027 COMPLETE CBC AUTOMATED: CPT

## 2020-09-14 PROCEDURE — 36415 COLL VENOUS BLD VENIPUNCTURE: CPT

## 2020-09-15 ENCOUNTER — OFFICE VISIT (OUTPATIENT)
Dept: INTERNAL MEDICINE CLINIC | Facility: CLINIC | Age: 85
End: 2020-09-15
Payer: COMMERCIAL

## 2020-09-15 VITALS
SYSTOLIC BLOOD PRESSURE: 138 MMHG | DIASTOLIC BLOOD PRESSURE: 60 MMHG | OXYGEN SATURATION: 98 % | HEIGHT: 61 IN | WEIGHT: 129 LBS | HEART RATE: 56 BPM | BODY MASS INDEX: 24.35 KG/M2 | TEMPERATURE: 98.1 F

## 2020-09-15 DIAGNOSIS — E03.9 HYPOTHYROIDISM, UNSPECIFIED TYPE: ICD-10-CM

## 2020-09-15 DIAGNOSIS — I50.30 DIASTOLIC CHF WITH PRESERVED LEFT VENTRICULAR FUNCTION, NYHA CLASS 2 (HCC): ICD-10-CM

## 2020-09-15 DIAGNOSIS — K21.9 GASTROESOPHAGEAL REFLUX DISEASE WITHOUT ESOPHAGITIS: Primary | ICD-10-CM

## 2020-09-15 DIAGNOSIS — I10 ESSENTIAL HYPERTENSION: ICD-10-CM

## 2020-09-15 DIAGNOSIS — E78.2 MIXED HYPERLIPIDEMIA: ICD-10-CM

## 2020-09-15 PROCEDURE — 1036F TOBACCO NON-USER: CPT | Performed by: INTERNAL MEDICINE

## 2020-09-15 PROCEDURE — 1160F RVW MEDS BY RX/DR IN RCRD: CPT | Performed by: INTERNAL MEDICINE

## 2020-09-15 PROCEDURE — 99214 OFFICE O/P EST MOD 30 MIN: CPT | Performed by: INTERNAL MEDICINE

## 2020-09-15 NOTE — PROGRESS NOTES
Assessment/Plan:    1  Essential hypertension  Blood pressure is acceptable on present regimen  Continue with low-salt diet    2  Hypothyroidism  Continue with present dose of levothyroxine 25 mcg daily  Will check thyroid function test before next visit    3  Chronic diastolic CHF  Stable  Will continue with present regimen  Diagnoses and all orders for this visit:    Gastroesophageal reflux disease without esophagitis  -     CBC; Future    Hypothyroidism, unspecified type  -     TSH, 3rd generation with Free T4 reflex; Future    Essential hypertension  -     Basic metabolic panel; Future    Diastolic CHF with preserved left ventricular function, NYHA class 2 ([address] )    Mixed hyperlipidemia               Subjective:          Patient ID: Stephanie Davidson is a 80 y o  female  Patient is here for regular follow-up  Does have blood work done yesterday would like to discuss results  Otherwise she is doing okay  No new complaints  No chest pain or shortness of breath  The following portions of the patient's history were reviewed and updated as appropriate: allergies, current medications, past family history, past medical history, past social history, past surgical history and problem list     Review of Systems   Constitutional: Negative for fatigue and fever  HENT: Negative for congestion, ear discharge, ear pain, postnasal drip, sinus pressure, sore throat, tinnitus and trouble swallowing  Eyes: Negative for discharge, itching and visual disturbance  Respiratory: Negative for cough and shortness of breath  Cardiovascular: Negative for chest pain and palpitations  Gastrointestinal: Negative for abdominal pain, diarrhea, nausea and vomiting  Endocrine: Negative for cold intolerance and polyuria  Genitourinary: Negative for difficulty urinating, dysuria and urgency  Musculoskeletal: Positive for arthralgias  Negative for neck pain  Skin: Negative for rash     Allergic/Immunologic: Negative for environmental allergies  Neurological: Negative for dizziness, weakness and headaches  Psychiatric/Behavioral: Negative for agitation  The patient is not nervous/anxious            Past Medical History:   Diagnosis Date    Allergic     Anemia     Arthritis     [facility] )     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[facility] )     Depression 7/15/2020    Disease of thyroid gland     Fatigue     last assessed 8/5/15; resolved 9/30/16    Gastric cancer ([facility] ) 2013    GERD (gastroesophageal reflux disease)     Hyperlipidemia     Hypertension     Osteoporosis     Senile cataract     last assessed 10/18/13; resolved 2/6/15    Situational depression     last assessed 2/11/15; resolved 7/20/15    Visual impairment          Current Outpatient Medications:     acetaminophen (TYLENOL) 500 mg tablet, Take 1 tablet by mouth daily  , Disp: , Rfl:     albuterol (VENTOLIN HFA) 90 mcg/act inhaler, Inhale 2 puffs every 6 (six) hours as needed for wheezing, Disp: 1 Inhaler, Rfl: 3    amLODIPine (NORVASC) 5 mg tablet, Take 1 tablet (5 mg total) by mouth daily, Disp: 90 tablet, Rfl: 2    bisoprolol (ZEBETA) 10 MG tablet, Take 1 tablet (10 mg total) by mouth every 12 (twelve) hours, Disp: 180 tablet, Rfl: 1    calcium carbonate (TUMS EX) 750 mg chewable tablet, Chew 750 mg Three times daily as needed, Disp: , Rfl:     carboxymethylcellulose 0 5 % SOLN, Apply 1 drop to eye 2 (two) times a day as needed, Disp: , Rfl:     Cholecalciferol (VITAMIN D3) 1000 units CAPS, Take 1 tablet by mouth daily, Disp: , Rfl:     ciprofloxacin (CILOXAN) 0 3 % ophthalmic solution, Administer 1 drop into the left eye every other day  , Disp: , Rfl:     escitalopram (LEXAPRO) 5 mg tablet, Take 1 tablet (5 mg total) by mouth daily, Disp: 90 tablet, Rfl: 3    ferrous sulfate (FEROSUL) 325 (65 Fe) mg tablet, Take 1 tablet (325 mg total) by mouth 2 (two) times a day with meals, Disp: , Rfl:     furosemide (LASIX) 20 mg tablet, Take 1 tablet (20 mg total) by mouth daily, Disp: 90 tablet, Rfl: 3    furosemide (LASIX) 40 mg tablet, Take 1 tablet (40 mg total) by mouth daily, Disp: 90 tablet, Rfl: 1    levothyroxine 25 mcg tablet, Take 1 tablet (25 mcg total) by mouth daily, Disp: 90 tablet, Rfl: 1    losartan (COZAAR) 25 mg tablet, Take 3 tablets (75 mg total) by mouth daily, Disp: 270 tablet, Rfl: 1    Multiple Vitamins-Minerals (CENTRUM SILVER 50+WOMEN PO), Take 1 tablet by mouth daily, Disp: , Rfl:     omeprazole (PriLOSEC) 40 MG capsule, take 1 capsule by mouth daily, Disp: 90 capsule, Rfl: 1    polyethylene glycol-propylene glycol (SYSTANE) 0 4-0 3 %, Administer 2 drops to both eyes as needed  , Disp: , Rfl:     potassium chloride (K-DUR,KLOR-CON) 20 mEq tablet, Take 1 tablet (20 mEq total) by mouth 2 (two) times a day, Disp: 180 tablet, Rfl: 1    prednisoLONE acetate (PRED FORTE) 1 % ophthalmic suspension, Administer 1 drop into the left eye every other day  , Disp: , Rfl:     pyridoxine (VITAMIN B6) 100 mg tablet, Take 100 mg by mouth daily  , Disp: , Rfl:     Allergies   Allergen Reactions    Latex Rash    Oxycodone Nausea Only, Hallucinations and Other (See Comments)     Hallucinations  Hallucinations  Other reaction(s): Hallucinations  Hallucinations  Hallucinations  Other reaction(s): Hallucinations  Hallucinations  Other reaction(s): Hallucinations, Other (See Comments)  Hallucinations  Hallucinations  Other reaction(s): Hallucinations  Hallucinations    Oxycodone-Acetaminophen Nausea Only and Hallucinations    Pollen Extract Allergic Rhinitis       Social History   Past Surgical History:   Procedure Laterality Date    COLONOSCOPY      DENTAL SURGERY      EYE SURGERY      GASTRIC RESTRICTION SURGERY      for cancer    JOINT REPLACEMENT      KNEE SURGERY Right 01/2017    TKR    LYMPH NODE BIOPSY      STOMACH SURGERY       Family History   Problem Relation Age of Onset    Heart attack Mother     Diabetes Mother     Heart disease Mother     Prostate cancer Father        Objective:  /60   Pulse 56   Temp 98 1 °F (36 7 °C) (Temporal)   Ht 5' 1" (1 549 m)   Wt 58 5 kg (129 lb) Comment: w shoes  SpO2 98% Comment: ra  BMI 24 37 kg/m²   Body mass index is 24 37 kg/m²  Physical Exam  Constitutional:       Appearance: She is well-developed  HENT:      Head: Normocephalic  Right Ear: Ear canal and external ear normal       Left Ear: Ear canal and external ear normal       Mouth/Throat:      Mouth: Mucous membranes are moist    Eyes:      General: No scleral icterus  Conjunctiva/sclera: Conjunctivae normal       Pupils: Pupils are equal, round, and reactive to light  Neck:      Musculoskeletal: Normal range of motion and neck supple  Thyroid: No thyromegaly  Trachea: No tracheal deviation  Cardiovascular:      Rate and Rhythm: Normal rate and regular rhythm  Heart sounds: Murmur present  Pulmonary:      Effort: Pulmonary effort is normal  No respiratory distress  Breath sounds: Normal breath sounds  Chest:      Chest wall: No tenderness  Abdominal:      General: Bowel sounds are normal       Palpations: Abdomen is soft  There is no mass  Tenderness: There is no abdominal tenderness  Musculoskeletal: Normal range of motion  Right lower leg: No edema  Left lower leg: No edema  Lymphadenopathy:      Cervical: No cervical adenopathy  Skin:     General: Skin is warm  Neurological:      Mental Status: She is alert  Cranial Nerves: No cranial nerve deficit  Psychiatric:         Mood and Affect: Mood normal          Behavior: Behavior normal          Thought Content:  Thought content normal          Judgment: Judgment normal

## 2020-11-10 DIAGNOSIS — I10 ESSENTIAL HYPERTENSION: ICD-10-CM

## 2020-11-10 RX ORDER — FUROSEMIDE 20 MG/1
TABLET ORAL
Qty: 90 TABLET | Refills: 3 | OUTPATIENT
Start: 2020-11-10

## 2020-11-24 ENCOUNTER — LAB (OUTPATIENT)
Dept: LAB | Facility: IMAGING CENTER | Age: 85
End: 2020-11-24
Payer: COMMERCIAL

## 2020-11-24 DIAGNOSIS — K21.9 GASTROESOPHAGEAL REFLUX DISEASE WITHOUT ESOPHAGITIS: ICD-10-CM

## 2020-11-24 DIAGNOSIS — E03.9 HYPOTHYROIDISM, UNSPECIFIED TYPE: ICD-10-CM

## 2020-11-24 DIAGNOSIS — I10 ESSENTIAL HYPERTENSION: ICD-10-CM

## 2020-11-24 LAB
ANION GAP SERPL CALCULATED.3IONS-SCNC: 2 MMOL/L (ref 4–13)
BUN SERPL-MCNC: 23 MG/DL (ref 5–25)
CALCIUM SERPL-MCNC: 10 MG/DL (ref 8.3–10.1)
CHLORIDE SERPL-SCNC: 98 MMOL/L (ref 100–108)
CO2 SERPL-SCNC: 33 MMOL/L (ref 21–32)
CREAT SERPL-MCNC: 0.88 MG/DL (ref 0.6–1.3)
ERYTHROCYTE [DISTWIDTH] IN BLOOD BY AUTOMATED COUNT: 13.6 % (ref 11.6–15.1)
GFR SERPL CREATININE-BSD FRML MDRD: 58 ML/MIN/1.73SQ M
GLUCOSE P FAST SERPL-MCNC: 90 MG/DL (ref 65–99)
HCT VFR BLD AUTO: 38.9 % (ref 34.8–46.1)
HGB BLD-MCNC: 12.1 G/DL (ref 11.5–15.4)
MCH RBC QN AUTO: 29.7 PG (ref 26.8–34.3)
MCHC RBC AUTO-ENTMCNC: 31.1 G/DL (ref 31.4–37.4)
MCV RBC AUTO: 95 FL (ref 82–98)
PLATELET # BLD AUTO: 217 THOUSANDS/UL (ref 149–390)
PMV BLD AUTO: 10 FL (ref 8.9–12.7)
POTASSIUM SERPL-SCNC: 4.3 MMOL/L (ref 3.5–5.3)
RBC # BLD AUTO: 4.08 MILLION/UL (ref 3.81–5.12)
SODIUM SERPL-SCNC: 133 MMOL/L (ref 136–145)
TSH SERPL DL<=0.05 MIU/L-ACNC: 3.31 UIU/ML (ref 0.36–3.74)
WBC # BLD AUTO: 6.72 THOUSAND/UL (ref 4.31–10.16)

## 2020-11-24 PROCEDURE — 85027 COMPLETE CBC AUTOMATED: CPT

## 2020-11-24 PROCEDURE — 36415 COLL VENOUS BLD VENIPUNCTURE: CPT

## 2020-11-24 PROCEDURE — 84443 ASSAY THYROID STIM HORMONE: CPT

## 2020-11-24 PROCEDURE — 80048 BASIC METABOLIC PNL TOTAL CA: CPT

## 2020-12-01 ENCOUNTER — OFFICE VISIT (OUTPATIENT)
Dept: INTERNAL MEDICINE CLINIC | Facility: CLINIC | Age: 85
End: 2020-12-01
Payer: COMMERCIAL

## 2020-12-01 VITALS
OXYGEN SATURATION: 97 % | BODY MASS INDEX: 23.75 KG/M2 | HEART RATE: 52 BPM | RESPIRATION RATE: 18 BRPM | HEIGHT: 61 IN | WEIGHT: 125.8 LBS | DIASTOLIC BLOOD PRESSURE: 60 MMHG | TEMPERATURE: 98 F | SYSTOLIC BLOOD PRESSURE: 160 MMHG

## 2020-12-01 DIAGNOSIS — Z85.028 HISTORY OF GASTRIC CANCER: ICD-10-CM

## 2020-12-01 DIAGNOSIS — I10 ESSENTIAL HYPERTENSION: ICD-10-CM

## 2020-12-01 DIAGNOSIS — F32.A DEPRESSION, UNSPECIFIED DEPRESSION TYPE: ICD-10-CM

## 2020-12-01 DIAGNOSIS — Z23 NEEDS FLU SHOT: ICD-10-CM

## 2020-12-01 DIAGNOSIS — D50.9 IRON DEFICIENCY ANEMIA, UNSPECIFIED IRON DEFICIENCY ANEMIA TYPE: Primary | ICD-10-CM

## 2020-12-01 DIAGNOSIS — R26.2 AMBULATORY DYSFUNCTION: ICD-10-CM

## 2020-12-01 DIAGNOSIS — D62 ACUTE BLOOD LOSS ANEMIA: ICD-10-CM

## 2020-12-01 DIAGNOSIS — E87.1 HYPONATREMIA: ICD-10-CM

## 2020-12-01 DIAGNOSIS — E03.9 HYPOTHYROIDISM, UNSPECIFIED TYPE: ICD-10-CM

## 2020-12-01 DIAGNOSIS — E87.6 HYPOKALEMIA: ICD-10-CM

## 2020-12-01 PROCEDURE — 1036F TOBACCO NON-USER: CPT | Performed by: INTERNAL MEDICINE

## 2020-12-01 PROCEDURE — 90662 IIV NO PRSV INCREASED AG IM: CPT

## 2020-12-01 PROCEDURE — 3725F SCREEN DEPRESSION PERFORMED: CPT | Performed by: INTERNAL MEDICINE

## 2020-12-01 PROCEDURE — G0008 ADMIN INFLUENZA VIRUS VAC: HCPCS

## 2020-12-01 PROCEDURE — 99214 OFFICE O/P EST MOD 30 MIN: CPT | Performed by: INTERNAL MEDICINE

## 2020-12-01 PROCEDURE — 1160F RVW MEDS BY RX/DR IN RCRD: CPT | Performed by: INTERNAL MEDICINE

## 2020-12-01 RX ORDER — LOSARTAN POTASSIUM 25 MG/1
75 TABLET ORAL DAILY
Qty: 270 TABLET | Refills: 1 | Status: SHIPPED | OUTPATIENT
Start: 2020-12-01 | End: 2021-05-28 | Stop reason: SDUPTHER

## 2020-12-01 RX ORDER — ESCITALOPRAM OXALATE 10 MG/1
10 TABLET ORAL DAILY
Qty: 90 TABLET | Refills: 1 | Status: SHIPPED | OUTPATIENT
Start: 2020-12-01 | End: 2021-05-28 | Stop reason: SDUPTHER

## 2020-12-01 RX ORDER — LEVOTHYROXINE SODIUM 0.03 MG/1
25 TABLET ORAL DAILY
Qty: 90 TABLET | Refills: 1 | Status: SHIPPED | OUTPATIENT
Start: 2020-12-01 | End: 2021-05-28 | Stop reason: SDUPTHER

## 2020-12-01 RX ORDER — AMLODIPINE BESYLATE 5 MG/1
5 TABLET ORAL DAILY
Qty: 90 TABLET | Refills: 2 | Status: SHIPPED | OUTPATIENT
Start: 2020-12-01 | End: 2021-05-28 | Stop reason: SDUPTHER

## 2020-12-01 RX ORDER — BISOPROLOL FUMARATE 10 MG/1
10 TABLET ORAL EVERY 12 HOURS SCHEDULED
Qty: 180 TABLET | Refills: 1 | Status: SHIPPED | OUTPATIENT
Start: 2020-12-01 | End: 2021-05-28 | Stop reason: SDUPTHER

## 2020-12-01 RX ORDER — POTASSIUM CHLORIDE 20 MEQ/1
20 TABLET, EXTENDED RELEASE ORAL 2 TIMES DAILY
Qty: 180 TABLET | Refills: 1 | Status: SHIPPED | OUTPATIENT
Start: 2020-12-01 | End: 2021-05-28 | Stop reason: SDUPTHER

## 2020-12-01 RX ORDER — FUROSEMIDE 40 MG/1
40 TABLET ORAL DAILY
Qty: 90 TABLET | Refills: 1 | Status: SHIPPED | OUTPATIENT
Start: 2020-12-01 | End: 2021-05-28 | Stop reason: SDUPTHER

## 2020-12-01 RX ORDER — OMEPRAZOLE 40 MG/1
40 CAPSULE, DELAYED RELEASE ORAL DAILY
Qty: 90 CAPSULE | Refills: 1 | Status: SHIPPED | OUTPATIENT
Start: 2020-12-01 | End: 2021-05-28 | Stop reason: SDUPTHER

## 2020-12-01 RX ORDER — FERROUS SULFATE 325(65) MG
325 TABLET ORAL 2 TIMES DAILY WITH MEALS
Start: 2020-12-01 | End: 2021-05-28 | Stop reason: SDUPTHER

## 2020-12-01 RX ORDER — FUROSEMIDE 20 MG/1
20 TABLET ORAL DAILY
Qty: 90 TABLET | Refills: 3 | Status: SHIPPED | OUTPATIENT
Start: 2020-12-01 | End: 2021-05-28 | Stop reason: SDUPTHER

## 2020-12-01 RX ORDER — MULTIVITAMIN WITH IRON
100 TABLET ORAL DAILY
Status: CANCELLED | OUTPATIENT
Start: 2020-12-01

## 2020-12-07 ENCOUNTER — TELEPHONE (OUTPATIENT)
Dept: INTERNAL MEDICINE CLINIC | Facility: CLINIC | Age: 85
End: 2020-12-07

## 2021-01-26 NOTE — PROGRESS NOTES
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    R77 54 Diastolic CHF with preserved left ventricular function, NYHA class 2 (HCC)    I11 0 Hypertensive heart disease with heart failure (HCC)    I35 0 Nonrheumatic aortic valve stenosis    I87 2 Venous (peripheral) insufficiency    M15 0 Primary generalized (osteo) arthritis    R26 2 Ambulatory dysfunction    F32 9 Depression    R73 9 Hyperglycemia    E78 5 Hyperlipidemia    E87 1 Hyponatremia         Subjective:     Patient ID: Elaine Kohler is a 80 y o  female     No chief complaint on file  HPI    Review of Systems    Objective: There were no vitals taken for this visit      Problem List Items Addressed This Visit     None          Physical Exam

## 2021-01-27 ENCOUNTER — LAB (OUTPATIENT)
Dept: LAB | Facility: IMAGING CENTER | Age: 86
End: 2021-01-27
Payer: COMMERCIAL

## 2021-01-27 DIAGNOSIS — I10 ESSENTIAL HYPERTENSION: ICD-10-CM

## 2021-01-27 LAB
ANION GAP SERPL CALCULATED.3IONS-SCNC: 3 MMOL/L (ref 4–13)
BUN SERPL-MCNC: 21 MG/DL (ref 5–25)
CALCIUM SERPL-MCNC: 10.4 MG/DL (ref 8.3–10.1)
CHLORIDE SERPL-SCNC: 102 MMOL/L (ref 100–108)
CO2 SERPL-SCNC: 32 MMOL/L (ref 21–32)
CREAT SERPL-MCNC: 0.97 MG/DL (ref 0.6–1.3)
ERYTHROCYTE [DISTWIDTH] IN BLOOD BY AUTOMATED COUNT: 13.5 % (ref 11.6–15.1)
GFR SERPL CREATININE-BSD FRML MDRD: 52 ML/MIN/1.73SQ M
GLUCOSE P FAST SERPL-MCNC: 100 MG/DL (ref 65–99)
HCT VFR BLD AUTO: 38.8 % (ref 34.8–46.1)
HGB BLD-MCNC: 12 G/DL (ref 11.5–15.4)
MCH RBC QN AUTO: 29.4 PG (ref 26.8–34.3)
MCHC RBC AUTO-ENTMCNC: 30.9 G/DL (ref 31.4–37.4)
MCV RBC AUTO: 95 FL (ref 82–98)
PLATELET # BLD AUTO: 262 THOUSANDS/UL (ref 149–390)
PMV BLD AUTO: 9.3 FL (ref 8.9–12.7)
POTASSIUM SERPL-SCNC: 4.8 MMOL/L (ref 3.5–5.3)
RBC # BLD AUTO: 4.08 MILLION/UL (ref 3.81–5.12)
SODIUM SERPL-SCNC: 137 MMOL/L (ref 136–145)
WBC # BLD AUTO: 6.62 THOUSAND/UL (ref 4.31–10.16)

## 2021-01-27 PROCEDURE — 80048 BASIC METABOLIC PNL TOTAL CA: CPT

## 2021-01-27 PROCEDURE — 85027 COMPLETE CBC AUTOMATED: CPT

## 2021-01-27 PROCEDURE — 36415 COLL VENOUS BLD VENIPUNCTURE: CPT

## 2021-02-01 ENCOUNTER — TELEMEDICINE (OUTPATIENT)
Dept: INTERNAL MEDICINE CLINIC | Age: 86
End: 2021-02-01
Payer: COMMERCIAL

## 2021-02-01 VITALS — BODY MASS INDEX: 24.55 KG/M2 | HEIGHT: 61 IN | WEIGHT: 130 LBS

## 2021-02-01 DIAGNOSIS — I10 ESSENTIAL HYPERTENSION: ICD-10-CM

## 2021-02-01 DIAGNOSIS — D50.9 IRON DEFICIENCY ANEMIA, UNSPECIFIED IRON DEFICIENCY ANEMIA TYPE: ICD-10-CM

## 2021-02-01 DIAGNOSIS — K21.9 GASTROESOPHAGEAL REFLUX DISEASE WITHOUT ESOPHAGITIS: Primary | ICD-10-CM

## 2021-02-01 DIAGNOSIS — E03.9 HYPOTHYROIDISM, UNSPECIFIED TYPE: ICD-10-CM

## 2021-02-01 DIAGNOSIS — I50.9 CHRONIC CONGESTIVE HEART FAILURE, UNSPECIFIED HEART FAILURE TYPE (HCC): ICD-10-CM

## 2021-02-01 PROCEDURE — 99214 OFFICE O/P EST MOD 30 MIN: CPT | Performed by: INTERNAL MEDICINE

## 2021-02-01 NOTE — PROGRESS NOTES
Virtual Brief Visit    Assessment/Plan:    1  Essential hypertension   she does not check blood pressure at home  Will continue with present regimen  Will follow up in our office next week     2  Hypothyroidism   continue with present dose of levothyroxine     3  Mild hypercalcemia   advised to avoid any over-the-counter calcium supplementation for now  4  Chronic diastolic congestive heart failure   stable  Will continue with present regimen    Problem List Items Addressed This Visit        Digestive    Gastroesophageal reflux disease - Primary       Endocrine    Hypothyroidism       Cardiovascular and Mediastinum    Essential hypertension    Chronic congestive heart failure ([address] )       Other    Anemia            Falls Plan of Care: balance, strength, and gait training instructions were provided  Reason for visit is   Chief Complaint   Patient presents with    Follow-up     2m follow up  review labs    Virtual Brief Visit     telephone only  cannot do enhanced visit  must be video for enhanced visits   Medication Problem     unable to do med rec   pt did not know all of her meds and said she has "a lot of them  Did not need refills at this time  Encounter provider Mattie Rosario MD    Provider located at [address] [place] [id number]    Recent Visits  No visits were found meeting these conditions  Showing recent visits within past 7 days and meeting all other requirements     Today's Visits  Date Type Provider Dept   02/01/21 Telemedicine Mattie Rosario MD [facility]   Showing today's visits and meeting all other requirements     Future Appointments  No visits were found meeting these conditions     Showing future appointments within next 150 days and meeting all other requirements        After connecting through telephone, the patient was identified by name and date of birth  Tammy Sanchez was informed that this is a telemedicine visit and that the visit is being conducted through telephone  My office door was closed  No one else was in the room  She acknowledged consent and understanding of privacy and security of the platform  The patient has agreed to participate and understands she can discontinue the visit at any time  Patient is aware this is a billable service  Subjective    Tammy Sanchez is a 80 y o  female   This is a follow-up  No new complaints  Does have blood work done last week would like to discuss results  Otherwise no new complaints  Routine follow-up after blood work  No new complaints  Otherwise she is doing all right  Denied any chest pain shortness of breath or worsening swelling of lower extremities         Past Medical History:   Diagnosis Date    Allergic     Anemia     Arthritis     [facility]     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     Depression 7/15/2020    Disease of thyroid gland     Fatigue     last assessed 8/5/15; resolved 9/30/16    [facility] 2013    GERD (gastroesophageal reflux disease)     Hyperlipidemia     Hypertension     Osteoporosis     Senile cataract     last assessed 10/18/13; resolved 2/6/15    Situational depression     last assessed 2/11/15; resolved 7/20/15    Visual impairment        Past Surgical History:   Procedure Laterality Date    COLONOSCOPY      DENTAL SURGERY      EYE SURGERY      GASTRIC RESTRICTION SURGERY      for cancer    JOINT REPLACEMENT      KNEE SURGERY Right 01/2017    TKR    LYMPH NODE BIOPSY      STOMACH SURGERY         Current Outpatient Medications   Medication Sig Dispense Refill    acetaminophen (TYLENOL) 500 mg tablet Take 1 tablet by mouth as needed       albuterol (VENTOLIN HFA) 90 mcg/act inhaler Inhale 2 puffs every 6 (six) hours as needed for wheezing (Patient not taking: Reported on 12/1/2020) 1 Inhaler 3    amLODIPine (NORVASC) 5 mg tablet Take 1 tablet (5 mg total) by mouth daily 90 tablet 2    bisoprolol (ZEBETA) 10 MG tablet Take 1 tablet (10 mg total) by mouth every 12 (twelve) hours 180 tablet 1    calcium carbonate (TUMS EX) 750 mg chewable tablet Chew 750 mg Three times daily as needed      carboxymethylcellulose 0 5 % SOLN Apply 1 drop to eye 2 (two) times a day as needed      Cholecalciferol (VITAMIN D3) 1000 units CAPS Take 1 tablet by mouth daily      ciprofloxacin (CILOXAN) 0 3 % ophthalmic solution Administer 1 drop into the left eye every other day        escitalopram (LEXAPRO) 10 mg tablet Take 1 tablet (10 mg total) by mouth daily 90 tablet 1    ferrous sulfate (FeroSul) 325 (65 Fe) mg tablet Take 1 tablet (325 mg total) by mouth 2 (two) times a day with meals      furosemide (LASIX) 20 mg tablet Take 1 tablet (20 mg total) by mouth daily 90 tablet 3    furosemide (LASIX) 40 mg tablet Take 1 tablet (40 mg total) by mouth daily 90 tablet 1    levothyroxine 25 mcg tablet Take 1 tablet (25 mcg total) by mouth daily 90 tablet 1    losartan (COZAAR) 25 mg tablet Take 3 tablets (75 mg total) by mouth daily 270 tablet 1    Multiple Vitamins-Minerals (CENTRUM SILVER 50+WOMEN PO) Take 1 tablet by mouth daily      omeprazole (PriLOSEC) 40 MG capsule Take 1 capsule (40 mg total) by mouth daily 90 capsule 1    polyethylene glycol-propylene glycol (SYSTANE) 0 4-0 3 % Administer 2 drops to both eyes as needed        potassium chloride (K-DUR,KLOR-CON) 20 mEq tablet Take 1 tablet (20 mEq total) by mouth 2 (two) times a day 180 tablet 1    prednisoLONE acetate (PRED FORTE) 1 % ophthalmic suspension Administer 1 drop into the left eye every other day        pyridoxine (VITAMIN B6) 100 mg tablet Take 100 mg by mouth daily         No current facility-administered medications for this visit           Allergies   Allergen Reactions    Latex Rash    Oxycodone Nausea Only, Hallucinations and Other (See Comments) Hallucinations  Hallucinations  Other reaction(s): Hallucinations  Hallucinations  Hallucinations  Other reaction(s): Hallucinations  Hallucinations  Other reaction(s): Hallucinations, Other (See Comments)  Hallucinations  Hallucinations  Other reaction(s): Hallucinations  Hallucinations    Oxycodone-Acetaminophen Nausea Only and Hallucinations    Pollen Extract Allergic Rhinitis       Review of Systems   Constitutional: Negative for fatigue and fever  HENT: Negative for congestion, ear discharge, ear pain, postnasal drip, sinus pressure, sore throat, tinnitus and trouble swallowing  Eyes: Negative for discharge, itching and visual disturbance  Respiratory: Negative for cough and shortness of breath  Cardiovascular: Negative for chest pain and palpitations  Gastrointestinal: Negative for abdominal pain, diarrhea, nausea and vomiting  Endocrine: Negative for cold intolerance and polyuria  Genitourinary: Negative for difficulty urinating, dysuria and urgency  Musculoskeletal: Positive for arthralgias  Negative for neck pain  Skin: Negative for rash  Allergic/Immunologic: Negative for environmental allergies  Neurological: Negative for dizziness, weakness and headaches  Psychiatric/Behavioral: The patient is not nervous/anxious  Vitals:    02/01/21 0841   Weight: 59 kg (130 lb)   Height: 5' 1" (1 549 m)         I spent 15 minutes directly with the patient during this visit    VIRTUAL VISIT DISCLAIMER    Marcela Hoff acknowledges that she has consented to an online visit or consultation  She understands that the online visit is based solely on information provided by her, and that, in the absence of a face-to-face physical evaluation by the physician, the diagnosis she receives is both limited and provisional in terms of accuracy and completeness  This is not intended to replace a full medical face-to-face evaluation by the physician   Marcela Hoff understands and accepts these terms

## 2021-02-24 ENCOUNTER — OFFICE VISIT (OUTPATIENT)
Dept: INTERNAL MEDICINE CLINIC | Facility: CLINIC | Age: 86
End: 2021-02-24
Payer: COMMERCIAL

## 2021-02-24 VITALS
DIASTOLIC BLOOD PRESSURE: 72 MMHG | OXYGEN SATURATION: 96 % | WEIGHT: 125.4 LBS | RESPIRATION RATE: 18 BRPM | HEIGHT: 61 IN | BODY MASS INDEX: 23.68 KG/M2 | HEART RATE: 62 BPM | TEMPERATURE: 99.8 F | SYSTOLIC BLOOD PRESSURE: 138 MMHG

## 2021-02-24 DIAGNOSIS — E03.9 HYPOTHYROIDISM, UNSPECIFIED TYPE: Primary | ICD-10-CM

## 2021-02-24 DIAGNOSIS — F32.A DEPRESSION, UNSPECIFIED DEPRESSION TYPE: ICD-10-CM

## 2021-02-24 DIAGNOSIS — I10 ESSENTIAL HYPERTENSION: ICD-10-CM

## 2021-02-24 DIAGNOSIS — K21.9 GASTROESOPHAGEAL REFLUX DISEASE WITHOUT ESOPHAGITIS: ICD-10-CM

## 2021-02-24 DIAGNOSIS — E83.52 HYPERCALCEMIA: ICD-10-CM

## 2021-02-24 DIAGNOSIS — N30.00 ACUTE CYSTITIS WITHOUT HEMATURIA: ICD-10-CM

## 2021-02-24 DIAGNOSIS — D50.9 IRON DEFICIENCY ANEMIA, UNSPECIFIED IRON DEFICIENCY ANEMIA TYPE: ICD-10-CM

## 2021-02-24 LAB
SL AMB  POCT GLUCOSE, UA: ABNORMAL
SL AMB LEUKOCYTE ESTERASE,UA: ABNORMAL
SL AMB POCT BILIRUBIN,UA: ABNORMAL
SL AMB POCT BLOOD,UA: ABNORMAL
SL AMB POCT CLARITY,UA: CLEAR
SL AMB POCT COLOR,UA: YELLOW
SL AMB POCT KETONES,UA: ABNORMAL
SL AMB POCT NITRITE,UA: POSITIVE
SL AMB POCT PH,UA: 5.5
SL AMB POCT SPECIFIC GRAVITY,UA: 1.02
SL AMB POCT URINE PROTEIN: ABNORMAL
SL AMB POCT UROBILINOGEN: 0.2

## 2021-02-24 PROCEDURE — 81003 URINALYSIS AUTO W/O SCOPE: CPT | Performed by: INTERNAL MEDICINE

## 2021-02-24 PROCEDURE — 1036F TOBACCO NON-USER: CPT | Performed by: INTERNAL MEDICINE

## 2021-02-24 PROCEDURE — 1160F RVW MEDS BY RX/DR IN RCRD: CPT | Performed by: INTERNAL MEDICINE

## 2021-02-24 PROCEDURE — 99214 OFFICE O/P EST MOD 30 MIN: CPT | Performed by: INTERNAL MEDICINE

## 2021-02-24 RX ORDER — CEPHALEXIN 250 MG/1
250 CAPSULE ORAL EVERY 8 HOURS SCHEDULED
Qty: 21 CAPSULE | Refills: 0 | Status: SHIPPED | OUTPATIENT
Start: 2021-02-24 | End: 2021-03-03

## 2021-02-24 NOTE — PROGRESS NOTES
Assessment/Plan:    1  Acute cystitis  Will start patient on cephalexin 250 mg p o  T i d  For 1 week  Also advised for cranberry juice and Tylenol as needed  2  Hypercalcemia  Calcium is 10 4  Advised to discontinue any calcium supplementation over-the-counter and will repeat in about 6 week  Continue with vitamin-D     3  Essential hypertension  Repeat blood pressure is 138/72  Will continue with present regimen    4  Depression  Doing well on present regimen    5  Iron deficiency anemia  Hemoglobin remains stable  Diagnoses and all orders for this visit:    Hypothyroidism, unspecified type    Gastroesophageal reflux disease without esophagitis    Essential hypertension    Depression, unspecified depression type    Iron deficiency anemia, unspecified iron deficiency anemia type    Acute cystitis without hematuria  -     POCT urine dip auto non-scope  -     cephalexin (KEFLEX) 250 mg capsule; Take 1 capsule (250 mg total) by mouth every 8 (eight) hours for 7 days    Hypercalcemia  -     Basic metabolic panel; Future               Subjective:          Patient ID: Elaine Kohler is a 80 y o  female  Complaining of frequency and dysuria  Also have blood work done last week would like to discuss results  No fever chills no back pain  The following portions of the patient's history were reviewed and updated as appropriate: allergies, current medications, past family history, past medical history, past social history, past surgical history and problem list     Review of Systems   Constitutional: Negative for fatigue and fever  HENT: Negative for congestion, ear discharge, ear pain, postnasal drip, sinus pressure, sore throat, tinnitus and trouble swallowing  Eyes: Negative for discharge, itching and visual disturbance  Respiratory: Negative for cough and shortness of breath  Cardiovascular: Negative for chest pain and palpitations     Gastrointestinal: Negative for abdominal pain, diarrhea, nausea and vomiting  Endocrine: Negative for cold intolerance and polyuria  Genitourinary: Positive for dysuria and frequency  Negative for difficulty urinating and urgency  Musculoskeletal: Positive for arthralgias  Negative for neck pain  Skin: Negative for rash  Allergic/Immunologic: Negative for environmental allergies  Neurological: Negative for dizziness, weakness and headaches  Psychiatric/Behavioral: The patient is not nervous/anxious            Past Medical History:   Diagnosis Date    Allergic     Anemia     Arthritis     Cancer ([place] [address] )     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[address] )     Depression 7/15/2020    Disease of thyroid gland     Fatigue     last assessed 8/5/15; resolved 9/30/16    Gastric cancer ([place] [address] ) 2013    GERD (gastroesophageal reflux disease)     Hyperlipidemia     Hypertension     Osteoporosis     Senile cataract     last assessed 10/18/13; resolved 2/6/15    Situational depression     last assessed 2/11/15; resolved 7/20/15    Visual impairment          Current Outpatient Medications:     acetaminophen (TYLENOL) 500 mg tablet, Take 1 tablet by mouth as needed , Disp: , Rfl:     albuterol (VENTOLIN HFA) 90 mcg/act inhaler, Inhale 2 puffs every 6 (six) hours as needed for wheezing (Patient not taking: Reported on 12/1/2020), Disp: 1 Inhaler, Rfl: 3    amLODIPine (NORVASC) 5 mg tablet, Take 1 tablet (5 mg total) by mouth daily, Disp: 90 tablet, Rfl: 2    bisoprolol (ZEBETA) 10 MG tablet, Take 1 tablet (10 mg total) by mouth every 12 (twelve) hours, Disp: 180 tablet, Rfl: 1    calcium carbonate (TUMS EX) 750 mg chewable tablet, Chew 750 mg Three times daily as needed, Disp: , Rfl:     carboxymethylcellulose 0 5 % SOLN, Apply 1 drop to eye 2 (two) times a day as needed, Disp: , Rfl:     cephalexin (KEFLEX) 250 mg capsule, Take 1 capsule (250 mg total) by mouth every 8 (eight) hours for 7 days, Disp: 21 capsule, Rfl: 0    Cholecalciferol (VITAMIN D3) 1000 units CAPS, Take 1 tablet by mouth daily, Disp: , Rfl:     ciprofloxacin (CILOXAN) 0 3 % ophthalmic solution, Administer 1 drop into the left eye every other day  , Disp: , Rfl:     escitalopram (LEXAPRO) 10 mg tablet, Take 1 tablet (10 mg total) by mouth daily, Disp: 90 tablet, Rfl: 1    ferrous sulfate (FeroSul) 325 (65 Fe) mg tablet, Take 1 tablet (325 mg total) by mouth 2 (two) times a day with meals, Disp: , Rfl:     furosemide (LASIX) 20 mg tablet, Take 1 tablet (20 mg total) by mouth daily, Disp: 90 tablet, Rfl: 3    furosemide (LASIX) 40 mg tablet, Take 1 tablet (40 mg total) by mouth daily, Disp: 90 tablet, Rfl: 1    levothyroxine 25 mcg tablet, Take 1 tablet (25 mcg total) by mouth daily, Disp: 90 tablet, Rfl: 1    losartan (COZAAR) 25 mg tablet, Take 3 tablets (75 mg total) by mouth daily, Disp: 270 tablet, Rfl: 1    Multiple Vitamins-Minerals (CENTRUM SILVER 50+WOMEN PO), Take 1 tablet by mouth daily, Disp: , Rfl:     omeprazole (PriLOSEC) 40 MG capsule, Take 1 capsule (40 mg total) by mouth daily, Disp: 90 capsule, Rfl: 1    polyethylene glycol-propylene glycol (SYSTANE) 0 4-0 3 %, Administer 2 drops to both eyes as needed  , Disp: , Rfl:     potassium chloride (K-DUR,KLOR-CON) 20 mEq tablet, Take 1 tablet (20 mEq total) by mouth 2 (two) times a day, Disp: 180 tablet, Rfl: 1    prednisoLONE acetate (PRED FORTE) 1 % ophthalmic suspension, Administer 1 drop into the left eye every other day  , Disp: , Rfl:     pyridoxine (VITAMIN B6) 100 mg tablet, Take 100 mg by mouth daily  , Disp: , Rfl:     Allergies   Allergen Reactions    Latex Rash    Oxycodone Nausea Only, Hallucinations and Other (See Comments)     Hallucinations  Hallucinations  Other reaction(s): Hallucinations  Hallucinations  Hallucinations  Other reaction(s): Hallucinations  Hallucinations  Other reaction(s): Hallucinations, Other (See Comments)  Hallucinations  Hallucinations  Other reaction(s): Hallucinations  Hallucinations    Oxycodone-Acetaminophen Nausea Only and Hallucinations    Pollen Extract Allergic Rhinitis       Social History   Past Surgical History:   Procedure Laterality Date    COLONOSCOPY      DENTAL SURGERY      EYE SURGERY      GASTRIC RESTRICTION SURGERY      for cancer    JOINT REPLACEMENT      KNEE SURGERY Right 01/2017    TKR    LYMPH NODE BIOPSY      STOMACH SURGERY       Family History   Problem Relation Age of Onset    Heart attack Mother     Diabetes Mother     Heart disease Mother     Prostate cancer Father        Objective:  /72 (BP Location: Left arm)   Pulse 62   Temp 99 8 °F (37 7 °C) (Tympanic)   Resp 18   Ht 5' 1" (1 549 m)   Wt 56 9 kg (125 lb 6 4 oz)   SpO2 96%   BMI 23 69 kg/m²   Body mass index is 23 69 kg/m²  Physical Exam  Constitutional:       Appearance: She is well-developed  HENT:      Head: Normocephalic  Right Ear: External ear normal  There is impacted cerumen  Left Ear: External ear normal  There is no impacted cerumen  Mouth/Throat:      Pharynx: No posterior oropharyngeal erythema  Eyes:      General: No scleral icterus  Pupils: Pupils are equal, round, and reactive to light  Neck:      Musculoskeletal: Normal range of motion and neck supple  Thyroid: No thyromegaly  Trachea: No tracheal deviation  Cardiovascular:      Rate and Rhythm: Normal rate and regular rhythm  Heart sounds: Murmur present  Pulmonary:      Effort: Pulmonary effort is normal  No respiratory distress  Breath sounds: Normal breath sounds  Chest:      Chest wall: No tenderness  Abdominal:      General: Bowel sounds are normal       Palpations: Abdomen is soft  There is no mass  Tenderness: There is no abdominal tenderness  Musculoskeletal: Normal range of motion  Right lower leg: Edema present  Left lower leg: Edema present  Lymphadenopathy:      Cervical: No cervical adenopathy  Skin:     General: Skin is warm and dry  Neurological:      Mental Status: She is alert and oriented to person, place, and time  Cranial Nerves: No cranial nerve deficit  Psychiatric:         Mood and Affect: Mood normal          Behavior: Behavior normal          Thought Content:  Thought content normal          Judgment: Judgment normal

## 2021-04-01 ENCOUNTER — APPOINTMENT (OUTPATIENT)
Dept: LAB | Facility: IMAGING CENTER | Age: 86
End: 2021-04-01
Payer: COMMERCIAL

## 2021-04-01 DIAGNOSIS — E83.52 HYPERCALCEMIA: ICD-10-CM

## 2021-04-01 LAB
ANION GAP SERPL CALCULATED.3IONS-SCNC: 1 MMOL/L (ref 4–13)
BUN SERPL-MCNC: 22 MG/DL (ref 5–25)
CALCIUM SERPL-MCNC: 9.8 MG/DL (ref 8.3–10.1)
CHLORIDE SERPL-SCNC: 103 MMOL/L (ref 100–108)
CO2 SERPL-SCNC: 31 MMOL/L (ref 21–32)
CREAT SERPL-MCNC: 1.07 MG/DL (ref 0.6–1.3)
GFR SERPL CREATININE-BSD FRML MDRD: 46 ML/MIN/1.73SQ M
GLUCOSE P FAST SERPL-MCNC: 91 MG/DL (ref 65–99)
POTASSIUM SERPL-SCNC: 4.6 MMOL/L (ref 3.5–5.3)
SODIUM SERPL-SCNC: 135 MMOL/L (ref 136–145)

## 2021-04-01 PROCEDURE — 36415 COLL VENOUS BLD VENIPUNCTURE: CPT

## 2021-04-01 PROCEDURE — 80048 BASIC METABOLIC PNL TOTAL CA: CPT

## 2021-04-07 ENCOUNTER — TELEMEDICINE (OUTPATIENT)
Dept: INTERNAL MEDICINE CLINIC | Facility: CLINIC | Age: 86
End: 2021-04-07
Payer: COMMERCIAL

## 2021-04-07 VITALS — BODY MASS INDEX: 23.69 KG/M2 | HEIGHT: 61 IN

## 2021-04-07 DIAGNOSIS — I50.9 CHRONIC CONGESTIVE HEART FAILURE, UNSPECIFIED HEART FAILURE TYPE (HCC): ICD-10-CM

## 2021-04-07 DIAGNOSIS — E03.9 HYPOTHYROIDISM, UNSPECIFIED TYPE: ICD-10-CM

## 2021-04-07 DIAGNOSIS — D50.9 IRON DEFICIENCY ANEMIA, UNSPECIFIED IRON DEFICIENCY ANEMIA TYPE: ICD-10-CM

## 2021-04-07 DIAGNOSIS — I10 ESSENTIAL HYPERTENSION: ICD-10-CM

## 2021-04-07 DIAGNOSIS — K21.9 GASTROESOPHAGEAL REFLUX DISEASE WITHOUT ESOPHAGITIS: Primary | ICD-10-CM

## 2021-04-07 DIAGNOSIS — E87.1 HYPONATREMIA: ICD-10-CM

## 2021-04-07 PROCEDURE — 1036F TOBACCO NON-USER: CPT | Performed by: INTERNAL MEDICINE

## 2021-04-07 PROCEDURE — 99213 OFFICE O/P EST LOW 20 MIN: CPT | Performed by: INTERNAL MEDICINE

## 2021-04-07 PROCEDURE — 1160F RVW MEDS BY RX/DR IN RCRD: CPT | Performed by: INTERNAL MEDICINE

## 2021-04-07 NOTE — PROGRESS NOTES
Virtual Regular Visit      Assessment/Plan:      1  Hyponatremia   recent  Sodium level is 135  Will continue to monitor  2  Essential hypertension   blood pressure is stable on present regimen     3  Hypothyroidism   continue with present dose of levothyroxine     4  GERD   stable on Prilosec 20 mg daily     4  Chronic diastolic dysfunction   stable on present regimen    Problem List Items Addressed This Visit        Digestive    Gastroesophageal reflux disease - Primary       Endocrine    Hypothyroidism       Cardiovascular and Mediastinum    Essential hypertension    Chronic congestive heart failure ([address] )       Other    Anemia    Hyponatremia               Reason for visit is   Chief Complaint   Patient presents with    Follow-up        Encounter provider Tatiana Scott MD    Provider located at [address] [place] [address] [address] [facility] [address]      Recent Visits  No visits were found meeting these conditions  Showing recent visits within past 7 days and meeting all other requirements     Today's Visits  Date Type Provider Dept   04/07/21 Telemedicine Tatiana Scott MD [facility]   Showing today's visits and meeting all other requirements     Future Appointments  No visits were found meeting these conditions  Showing future appointments within next 150 days and meeting all other requirements        The patient was identified by name and date of birth  Trice Corrigan was informed that this is a telemedicine visit and that the visit is being conducted through [address] [place] [address] and patient was informed that this is not a secure, HIPAA-compliant platform  She agrees to proceed     My office door was closed  No one else was in the room  She acknowledged consent and understanding of privacy and security of the video platform   The patient has agreed to participate and understands they can discontinue the visit at any time  Patient is aware this is a billable service  Subjective  Tasha Rodriguez is a 80 y o  female    Patient is here for regular follow-up  Does have blood work done last week would like to discuss results  Otherwise no new complaints  Appetite is fairly well  No constipation or diarrhea  Very low level of physical activity         Past Medical History:   Diagnosis Date    Allergic     Anemia     Arthritis     Cancer ([facility] )     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[facility] )     Depression 7/15/2020    Disease of thyroid gland     Fatigue     last assessed 8/5/15; resolved 9/30/16    Gastric cancer ([facility] ) 2013    GERD (gastroesophageal reflux disease)     Hyperlipidemia     Hypertension     Osteoporosis     Senile cataract     last assessed 10/18/13; resolved 2/6/15    Situational depression     last assessed 2/11/15; resolved 7/20/15    Visual impairment        Past Surgical History:   Procedure Laterality Date    COLONOSCOPY      DENTAL SURGERY      EYE SURGERY      GASTRIC RESTRICTION SURGERY      for cancer    JOINT REPLACEMENT      KNEE SURGERY Right 01/2017    TKR    LYMPH NODE BIOPSY      STOMACH SURGERY         Current Outpatient Medications   Medication Sig Dispense Refill    acetaminophen (TYLENOL) 500 mg tablet Take 1 tablet by mouth as needed       amLODIPine (NORVASC) 5 mg tablet Take 1 tablet (5 mg total) by mouth daily 90 tablet 2    bisoprolol (ZEBETA) 10 MG tablet Take 1 tablet (10 mg total) by mouth every 12 (twelve) hours 180 tablet 1    calcium carbonate (TUMS EX) 750 mg chewable tablet Chew 750 mg Three times daily as needed      carboxymethylcellulose 0 5 % SOLN Apply 1 drop to eye 2 (two) times a day as needed      Cholecalciferol (VITAMIN D3) 1000 units CAPS Take 1 tablet by mouth daily      ciprofloxacin (CILOXAN) 0 3 % ophthalmic solution Administer 1 drop into the left eye every other day        escitalopram (LEXAPRO) 10 mg tablet Take 1 tablet (10 mg total) by mouth daily 90 tablet 1    ferrous sulfate (FeroSul) 325 (65 Fe) mg tablet Take 1 tablet (325 mg total) by mouth 2 (two) times a day with meals      furosemide (LASIX) 20 mg tablet Take 1 tablet (20 mg total) by mouth daily 90 tablet 3    furosemide (LASIX) 40 mg tablet Take 1 tablet (40 mg total) by mouth daily 90 tablet 1    levothyroxine 25 mcg tablet Take 1 tablet (25 mcg total) by mouth daily 90 tablet 1    losartan (COZAAR) 25 mg tablet Take 3 tablets (75 mg total) by mouth daily 270 tablet 1    omeprazole (PriLOSEC) 40 MG capsule Take 1 capsule (40 mg total) by mouth daily 90 capsule 1    polyethylene glycol-propylene glycol (SYSTANE) 0 4-0 3 % Administer 2 drops to both eyes as needed        potassium chloride (K-DUR,KLOR-CON) 20 mEq tablet Take 1 tablet (20 mEq total) by mouth 2 (two) times a day 180 tablet 1    pyridoxine (VITAMIN B6) 100 mg tablet Take 100 mg by mouth daily        albuterol (VENTOLIN HFA) 90 mcg/act inhaler Inhale 2 puffs every 6 (six) hours as needed for wheezing (Patient not taking: Reported on 12/1/2020) 1 Inhaler 3    Multiple Vitamins-Minerals (CENTRUM SILVER 50+WOMEN PO) Take 1 tablet by mouth daily      prednisoLONE acetate (PRED FORTE) 1 % ophthalmic suspension Administer 1 drop into the left eye every other day         No current facility-administered medications for this visit           Allergies   Allergen Reactions    Latex - Food Allergy Rash    Oxycodone Nausea Only, Hallucinations and Other (See Comments)     Hallucinations  Hallucinations  Other reaction(s): Hallucinations  Hallucinations  Hallucinations  Other reaction(s): Hallucinations  Hallucinations  Other reaction(s): Hallucinations, Other (See Comments)  Hallucinations  Hallucinations  Other reaction(s): Hallucinations  Hallucinations    Oxycodone-Acetaminophen Nausea Only and Hallucinations    Pollen Extract Allergic Rhinitis       Review of Systems   Constitutional: Negative for fatigue and fever  HENT: Negative for congestion, ear discharge, ear pain, postnasal drip, sinus pressure, sore throat, tinnitus and trouble swallowing  Eyes: Negative for discharge, itching and visual disturbance  Respiratory: Negative for cough and shortness of breath  Cardiovascular: Negative for chest pain and palpitations  Gastrointestinal: Negative for abdominal pain, diarrhea, nausea and vomiting  Endocrine: Negative for cold intolerance and polyuria  Genitourinary: Negative for difficulty urinating, dysuria and urgency  Musculoskeletal: Positive for arthralgias  Negative for neck pain  Skin: Negative for rash  Allergic/Immunologic: Negative for environmental allergies  Neurological: Negative for dizziness, weakness, light-headedness and headaches  Psychiatric/Behavioral: The patient is not nervous/anxious  Video Exam    Vitals:    04/07/21 0934   Height: 5' 1" (1 549 m)       Physical Exam  Nursing note reviewed  Constitutional:       Appearance: She is not ill-appearing  HENT:      Right Ear: External ear normal       Left Ear: External ear normal       Nose: No rhinorrhea  Mouth/Throat:      Pharynx: No posterior oropharyngeal erythema  Eyes:      Conjunctiva/sclera: Conjunctivae normal    Neck:      Musculoskeletal: Normal range of motion  Cardiovascular:      Comments: Trace bilateral lower extremity edema present  Pulmonary:      Effort: Pulmonary effort is normal  No respiratory distress  Abdominal:      General: There is no distension  Musculoskeletal:      Right lower leg: Edema present  Left lower leg: Edema present  Comments: Trace bilateral lower extremity edema present   Neurological:      Mental Status: She is oriented to person, place, and time  Psychiatric:         Mood and Affect: Mood normal          Behavior: Behavior normal          Thought Content:  Thought content normal           I spent 15 minutes directly with the patient during this visit      VIRTUAL VISIT DISCLAIMER    Kristen Coburn acknowledges that she has consented to an online visit or consultation  She understands that the online visit is based solely on information provided by her, and that, in the absence of a face-to-face physical evaluation by the physician, the diagnosis she receives is both limited and provisional in terms of accuracy and completeness  This is not intended to replace a full medical face-to-face evaluation by the physician  Kristen Coburn understands and accepts these terms

## 2021-05-23 DIAGNOSIS — F32.A DEPRESSION, UNSPECIFIED DEPRESSION TYPE: ICD-10-CM

## 2021-05-23 RX ORDER — ESCITALOPRAM OXALATE 10 MG/1
TABLET ORAL
Qty: 90 TABLET | Refills: 1 | OUTPATIENT
Start: 2021-05-23

## 2021-05-28 DIAGNOSIS — D62 ACUTE BLOOD LOSS ANEMIA: ICD-10-CM

## 2021-05-28 DIAGNOSIS — F32.A DEPRESSION, UNSPECIFIED DEPRESSION TYPE: ICD-10-CM

## 2021-05-28 DIAGNOSIS — I10 ESSENTIAL HYPERTENSION: ICD-10-CM

## 2021-05-28 DIAGNOSIS — E87.6 HYPOKALEMIA: ICD-10-CM

## 2021-05-28 DIAGNOSIS — Z85.028 HISTORY OF GASTRIC CANCER: ICD-10-CM

## 2021-05-28 DIAGNOSIS — E03.9 HYPOTHYROIDISM, UNSPECIFIED TYPE: ICD-10-CM

## 2021-05-28 RX ORDER — ESCITALOPRAM OXALATE 10 MG/1
10 TABLET ORAL DAILY
Qty: 5 TABLET | Refills: 1 | Status: SHIPPED | OUTPATIENT
Start: 2021-05-28 | End: 2021-10-13

## 2021-05-28 RX ORDER — FUROSEMIDE 20 MG/1
20 TABLET ORAL DAILY
Qty: 5 TABLET | Refills: 1 | Status: SHIPPED | OUTPATIENT
Start: 2021-05-28 | End: 2021-06-14 | Stop reason: HOSPADM

## 2021-05-28 RX ORDER — FERROUS SULFATE 325(65) MG
325 TABLET ORAL 2 TIMES DAILY WITH MEALS
Start: 2021-05-28

## 2021-05-28 RX ORDER — FUROSEMIDE 40 MG/1
40 TABLET ORAL DAILY
Qty: 5 TABLET | Refills: 1 | Status: SHIPPED | OUTPATIENT
Start: 2021-05-28 | End: 2022-01-31 | Stop reason: SDUPTHER

## 2021-05-28 RX ORDER — POTASSIUM CHLORIDE 20 MEQ/1
20 TABLET, EXTENDED RELEASE ORAL 2 TIMES DAILY
Qty: 10 TABLET | Refills: 1 | Status: SHIPPED | OUTPATIENT
Start: 2021-05-28 | End: 2022-02-23 | Stop reason: HOSPADM

## 2021-05-28 RX ORDER — LEVOTHYROXINE SODIUM 0.03 MG/1
25 TABLET ORAL DAILY
Qty: 5 TABLET | Refills: 1 | Status: SHIPPED | OUTPATIENT
Start: 2021-05-28 | End: 2021-10-20 | Stop reason: SDUPTHER

## 2021-05-28 RX ORDER — LOSARTAN POTASSIUM 25 MG/1
75 TABLET ORAL DAILY
Qty: 5 TABLET | Refills: 1 | Status: SHIPPED | OUTPATIENT
Start: 2021-05-28 | End: 2022-01-31 | Stop reason: ALTCHOICE

## 2021-05-28 RX ORDER — BISOPROLOL FUMARATE 10 MG/1
10 TABLET ORAL EVERY 12 HOURS SCHEDULED
Qty: 10 TABLET | Refills: 1 | Status: SHIPPED | OUTPATIENT
Start: 2021-05-28 | End: 2021-10-27 | Stop reason: SDUPTHER

## 2021-05-28 RX ORDER — AMLODIPINE BESYLATE 5 MG/1
5 TABLET ORAL DAILY
Qty: 5 TABLET | Refills: 1 | Status: SHIPPED | OUTPATIENT
Start: 2021-05-28 | End: 2021-11-26 | Stop reason: SDUPTHER

## 2021-05-28 RX ORDER — OMEPRAZOLE 40 MG/1
40 CAPSULE, DELAYED RELEASE ORAL DAILY
Qty: 5 CAPSULE | Refills: 1 | Status: SHIPPED | OUTPATIENT
Start: 2021-05-28 | End: 2021-11-22 | Stop reason: SDUPTHER

## 2021-05-28 NOTE — TELEPHONE ENCOUNTER
[place] medications at home  She would like to know if we can send in a 5 day supply to [facility] in [place], [place], phone number is [phone]  They already checked with the pharmacy and they stated it wouldn't be a problem for them to fill a five day supply      Amlodipine 5 mg  Bisoprolol 10 mg  lexapro 10 mg  Ferrous sulfate 325 mg  Furosemide 20 mg and 40 mg  levothyrxone 25 mcg  Losartan 25 mg  Omeprazole 40 mg  Potassium chloride 20 mEq

## 2021-06-06 DIAGNOSIS — Z85.028 HISTORY OF GASTRIC CANCER: ICD-10-CM

## 2021-06-06 DIAGNOSIS — E03.9 HYPOTHYROIDISM, UNSPECIFIED TYPE: ICD-10-CM

## 2021-06-06 RX ORDER — OMEPRAZOLE 40 MG/1
CAPSULE, DELAYED RELEASE ORAL
Qty: 90 CAPSULE | OUTPATIENT
Start: 2021-06-06

## 2021-06-06 RX ORDER — LEVOTHYROXINE SODIUM 0.03 MG/1
TABLET ORAL
Qty: 90 TABLET | OUTPATIENT
Start: 2021-06-06

## 2021-06-07 ENCOUNTER — RA CDI HCC (OUTPATIENT)
Dept: OTHER | Facility: HOSPITAL | Age: 86
End: 2021-06-07

## 2021-06-07 NOTE — PROGRESS NOTES
[facility]  coding opportunities             Chart reviewed, (number of) suggestions sent to provider: 4     Problem listed updated   Provider Accepted, (number of) suggestions accepted: 4         Number of suggestions actually used: 2      Number of suggestions NOT actually used: 2     Patients insurance company: [employer] (Medicare Advantage and Commercial)   dx not [id number] [id number], I11 0  Visit status: Patient arrived for their scheduled appointment        [facility]  coding opportunities             Chart reviewed, (number of) suggestions sent to provider: 4      DX:  I73 89-Other specified peripheral vascular diseases-podiatry notes  B14 10-TLWZWBD diastolic (congestive) heart failure  C36 2-EPYRABP diastolic (congestive) heart failure    It is noted in the patient record that the patient has F32 9 depression, single episode, unspecified   On Lexapro  Can the depression be further specified as:     F32 0 major depressive disorder, single episode, mild  OR   F32 1 major depressive disorder, single episode, moderate  OR   F32 2 major depressive disorder, single episode, severe without psychotic features OR   F32 4 major depressive disorder, single episode, in partial remission OR   F32 5 major depressive disorder, single episode, in full remission     Patients insurance company: [employer] ([employer])

## 2021-06-07 NOTE — PROGRESS NOTES
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 ([facility] )  I have evaluated the patient and found the condition to be: Resolved    E03 9 Hypothyroidism  I have evaluated the patient and found the condition to be: Stable  I have evaluated the patient and: Recommended continue with same treatment plan  The patient should continue treatment and follow-up with: Check thyroid function test before next visit    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  I have evaluated the patient and found the condition to be: Resolved    Y10 34 Diastolic CHF with preserved left ventricular function, NYHA class 2 ([facility] )  I have evaluated the patient and found the condition to be: Stable  I have evaluated the patient and: Adjusted medications as indicated  The patient should continue treatment and follow-up with: Will reduce Lasix 40 mg daily    I11 0 Hypertensive heart disease with heart failure ([facility] )  I have evaluated the patient and found the condition to be: Stable  I have evaluated the patient and: Recommended continue with same treatment plan    I35 0 Nonrheumatic aortic valve stenosis  I have evaluated the patient and found the condition to be: Stable  I have evaluated the patient and: Recommended continue with same treatment plan    I87 2 Venous (peripheral) insufficiency  I have evaluated the patient and found the condition to be: Stable  I have evaluated the patient and: Recommended continue with same treatment plan    M15 0 Primary generalized (osteo) arthritis  I have evaluated the patient and found the condition to be: Stable  I have evaluated the patient and: Recommended continue with same treatment plan    R26 2 Ambulatory dysfunction  I have evaluated the patient and found the condition to be:  Stable  I have evaluated the patient and: Recommended continue with same treatment plan    F32 9 Depression  I have evaluated the patient and found the condition to be: Stable  I have evaluated the patient and: Recommended continue with same treatment plan    R73 9 Hyperglycemia  I have evaluated the patient and found the condition to be: Stable  I have evaluated the patient and: Recommended continue with same treatment plan    E78 5 Hyperlipidemia  I have evaluated the patient and found the condition to be: Stable  I have evaluated the patient and: Recommended continue with same treatment plan    E87 1 Hyponatremia  I have evaluated the patient and found the condition to be: Stable  I have evaluated the patient and: Recommended continue with same treatment plan  The patient should continue treatment and follow-up with: Will continue to monitor           Subjective:     Patient ID: Annika Ramirez is a 80 y o  female     No chief complaint on file  This is for enhanced visit  Patient complaining of generalized weakness  Shortness of breath is better  Still with the different the joint pain off and on but overall stable  Review of Systems   Constitutional: Positive for fatigue  Negative for fever  HENT: Negative for congestion, ear discharge, ear pain, postnasal drip, sinus pressure, sore throat, tinnitus and trouble swallowing  Eyes: Negative for discharge, itching and visual disturbance  Respiratory: Negative for cough and shortness of breath  Cardiovascular: Negative for chest pain and palpitations  Gastrointestinal: Negative for abdominal pain, diarrhea, nausea and vomiting  Endocrine: Negative for cold intolerance and polyuria  Genitourinary: Negative for difficulty urinating, dysuria and urgency  Musculoskeletal: Positive for arthralgias  Negative for neck pain  Skin: Negative for rash  Allergic/Immunologic: Negative for environmental allergies     Neurological: Negative for dizziness, weakness and headaches  Psychiatric/Behavioral: Negative for agitation and behavioral problems  The patient is not nervous/anxious  Objective: There were no vitals taken for this visit  Problem List Items Addressed This Visit     None          Physical Exam  Vitals and nursing note reviewed  Constitutional:       General: She is not in acute distress  Appearance: She is well-developed  HENT:      Head: Normocephalic and atraumatic  Right Ear: There is impacted cerumen  Left Ear: External ear normal       Nose: No rhinorrhea  Eyes:      Conjunctiva/sclera: Conjunctivae normal    Cardiovascular:      Rate and Rhythm: Normal rate and regular rhythm  Heart sounds: No murmur heard  Comments: Trace bilateral lower extremity edema present  Pulmonary:      Effort: Pulmonary effort is normal  No respiratory distress  Breath sounds: Normal breath sounds  Abdominal:      Palpations: Abdomen is soft  Tenderness: There is no abdominal tenderness  Musculoskeletal:      Cervical back: Neck supple  Right lower leg: Edema present  Left lower leg: Edema present  Skin:     General: Skin is warm and dry  Neurological:      Mental Status: She is alert  Psychiatric:         Mood and Affect: Mood normal          Behavior: Behavior normal          Thought Content:  Thought content normal

## 2021-06-09 PROBLEM — I50.32 CHRONIC DIASTOLIC (CONGESTIVE) HEART FAILURE (HCC): Status: ACTIVE | Noted: 2021-06-09

## 2021-06-09 PROBLEM — I11.0 HYPERTENSIVE HEART DISEASE WITH HEART FAILURE (HCC): Status: ACTIVE | Noted: 2021-06-09

## 2021-06-09 PROBLEM — I73.89 OTHER SPECIFIED PERIPHERAL VASCULAR DISEASES (HCC): Status: ACTIVE | Noted: 2021-06-09

## 2021-06-14 ENCOUNTER — OFFICE VISIT (OUTPATIENT)
Dept: INTERNAL MEDICINE CLINIC | Age: 86
End: 2021-06-14
Payer: COMMERCIAL

## 2021-06-14 VITALS
SYSTOLIC BLOOD PRESSURE: 140 MMHG | BODY MASS INDEX: 22.36 KG/M2 | HEART RATE: 52 BPM | WEIGHT: 118.4 LBS | HEIGHT: 61 IN | DIASTOLIC BLOOD PRESSURE: 50 MMHG | TEMPERATURE: 98.2 F | OXYGEN SATURATION: 97 %

## 2021-06-14 DIAGNOSIS — K21.9 GASTROESOPHAGEAL REFLUX DISEASE WITHOUT ESOPHAGITIS: Primary | ICD-10-CM

## 2021-06-14 DIAGNOSIS — Z00.00 MEDICARE ANNUAL WELLNESS VISIT, SUBSEQUENT: ICD-10-CM

## 2021-06-14 DIAGNOSIS — I10 ESSENTIAL HYPERTENSION: ICD-10-CM

## 2021-06-14 DIAGNOSIS — E83.52 HYPERCALCEMIA: ICD-10-CM

## 2021-06-14 DIAGNOSIS — I73.89 OTHER SPECIFIED PERIPHERAL VASCULAR DISEASES (HCC): ICD-10-CM

## 2021-06-14 DIAGNOSIS — E03.9 HYPOTHYROIDISM, UNSPECIFIED TYPE: ICD-10-CM

## 2021-06-14 DIAGNOSIS — F33.9 DEPRESSION, RECURRENT (HCC): ICD-10-CM

## 2021-06-14 DIAGNOSIS — I50.9 CHRONIC CONGESTIVE HEART FAILURE, UNSPECIFIED HEART FAILURE TYPE (HCC): ICD-10-CM

## 2021-06-14 PROCEDURE — 1160F RVW MEDS BY RX/DR IN RCRD: CPT | Performed by: INTERNAL MEDICINE

## 2021-06-14 PROCEDURE — 1125F AMNT PAIN NOTED PAIN PRSNT: CPT | Performed by: INTERNAL MEDICINE

## 2021-06-14 PROCEDURE — 3725F SCREEN DEPRESSION PERFORMED: CPT | Performed by: INTERNAL MEDICINE

## 2021-06-14 PROCEDURE — 1036F TOBACCO NON-USER: CPT | Performed by: INTERNAL MEDICINE

## 2021-06-14 PROCEDURE — G0439 PPPS, SUBSEQ VISIT: HCPCS | Performed by: INTERNAL MEDICINE

## 2021-06-14 PROCEDURE — 99214 OFFICE O/P EST MOD 30 MIN: CPT | Performed by: INTERNAL MEDICINE

## 2021-06-14 PROCEDURE — T1015 CLINIC SERVICE: HCPCS | Performed by: INTERNAL MEDICINE

## 2021-06-14 PROCEDURE — 3288F FALL RISK ASSESSMENT DOCD: CPT | Performed by: INTERNAL MEDICINE

## 2021-06-14 PROCEDURE — 1170F FXNL STATUS ASSESSED: CPT | Performed by: INTERNAL MEDICINE

## 2021-06-14 NOTE — PROGRESS NOTES
Assessment/Plan:  1  Hypothyroidism  Will continue with the present dose of levothyroxine  Will check thyroid function test before next visit    2  Chronic congestive heart  Stable  Will decrease furosemide 40 mg daily rather than 60 mg daily  Continue with other meds    3  GERD  Stable on present use of omeprazole 40 mg daily    4  Osteoarthritis of multiple joints  Stable on p r n  Use of Tylenol    5  Essential hypertension  Blood pressure is stable on present regimen    6  Depression/anxiety  Will continue with the Lexapro 10 mg daily  Diagnoses and all orders for this visit:    Gastroesophageal reflux disease without esophagitis    Hypothyroidism, unspecified type  -     TSH, 3rd generation with Free T4 reflex; Future    Essential hypertension    Chronic congestive heart failure, unspecified heart failure type ([facility] )  -     Basic metabolic panel; Future  -     CBC; Future    Other specified peripheral vascular diseases ([place] [address] )    Depression, recurrent ([place] [address] )    Hypercalcemia    Medicare annual wellness visit, subsequent               Subjective:          Patient ID: Marisol Irving is a 80 y o  female  Patient is here for regular follow-up  No blood work prior to this visit  Still complaining of generalized fatigue and poor appetite  Shortness of breath is better  Different joint pain off and on but overall stable  The following portions of the patient's history were reviewed and updated as appropriate: allergies, current medications, past family history, past medical history, past social history, past surgical history and problem list     Review of Systems   Constitutional: Positive for fatigue  Negative for fever  HENT: Negative for congestion, ear discharge, ear pain, postnasal drip, sinus pressure, sore throat, tinnitus and trouble swallowing  Eyes: Negative for discharge, itching and visual disturbance  Respiratory: Negative for cough and shortness of breath      Cardiovascular: Positive for leg swelling  Negative for chest pain and palpitations  Gastrointestinal: Negative for abdominal pain, diarrhea, nausea and vomiting  Endocrine: Negative for cold intolerance and polyuria  Genitourinary: Negative for difficulty urinating, dysuria and urgency  Musculoskeletal: Negative for arthralgias and neck pain  Skin: Negative for rash  Allergic/Immunologic: Negative for environmental allergies  Neurological: Negative for dizziness, weakness and headaches  Psychiatric/Behavioral: Negative for agitation  The patient is not nervous/anxious            Past Medical History:   Diagnosis Date    Allergic     Anemia     Arthritis     Cancer ([place] [address] )     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[place] [address] )     Depression 7/15/2020    Disease of thyroid gland     Fatigue     last assessed 8/5/15; resolved 9/30/16    Gastric cancer ([place] [address] ) 2013    GERD (gastroesophageal reflux disease)     Hyperlipidemia     Hypertension     Osteoporosis     Senile cataract     last assessed 10/18/13; resolved 2/6/15    Situational depression     last assessed 2/11/15; resolved 7/20/15    Visual impairment          Current Outpatient Medications:     acetaminophen (TYLENOL) 500 mg tablet, Take 1 tablet by mouth as needed , Disp: , Rfl:     amLODIPine (NORVASC) 5 mg tablet, Take 1 tablet (5 mg total) by mouth daily, Disp: 5 tablet, Rfl: 1    bisoprolol (ZEBETA) 10 MG tablet, Take 1 tablet (10 mg total) by mouth every 12 (twelve) hours, Disp: 10 tablet, Rfl: 1    calcium carbonate (TUMS EX) 750 mg chewable tablet, Chew 750 mg Three times daily as needed, Disp: , Rfl:     carboxymethylcellulose 0 5 % SOLN, Apply 1 drop to eye 2 (two) times a day as needed, Disp: , Rfl:     Cholecalciferol (VITAMIN D3) 1000 units CAPS, Take 1 tablet by mouth daily, Disp: , Rfl:     ciprofloxacin (CILOXAN) 0 3 % ophthalmic solution, Administer 1 drop into the left eye every other day  , Disp: , Rfl:     escitalopram (LEXAPRO) 10 mg tablet, Take 1 tablet (10 mg total) by mouth daily, Disp: 5 tablet, Rfl: 1    ferrous sulfate (FeroSul) 325 (65 Fe) mg tablet, Take 1 tablet (325 mg total) by mouth 2 (two) times a day with meals, Disp: , Rfl:     furosemide (LASIX) 40 mg tablet, Take 1 tablet (40 mg total) by mouth daily, Disp: 5 tablet, Rfl: 1    levothyroxine 25 mcg tablet, Take 1 tablet (25 mcg total) by mouth daily, Disp: 5 tablet, Rfl: 1    losartan (COZAAR) 25 mg tablet, Take 3 tablets (75 mg total) by mouth daily, Disp: 5 tablet, Rfl: 1    omeprazole (PriLOSEC) 40 MG capsule, Take 1 capsule (40 mg total) by mouth daily, Disp: 5 capsule, Rfl: 1    polyethylene glycol-propylene glycol (SYSTANE) 0 4-0 3 %, Administer 2 drops to both eyes as needed  , Disp: , Rfl:     potassium chloride (K-DUR,KLOR-CON) 20 mEq tablet, Take 1 tablet (20 mEq total) by mouth 2 (two) times a day, Disp: 10 tablet, Rfl: 1    prednisoLONE acetate (PRED FORTE) 1 % ophthalmic suspension, Administer 1 drop into the left eye every other day  , Disp: , Rfl:     pyridoxine (VITAMIN B6) 100 mg tablet, Take 100 mg by mouth daily  , Disp: , Rfl:     Allergies   Allergen Reactions    Latex Rash    Oxycodone Nausea Only, Hallucinations and Other (See Comments)     Hallucinations  Hallucinations  Other reaction(s): Hallucinations  Hallucinations  Hallucinations  Other reaction(s): Hallucinations  Hallucinations  Other reaction(s): Hallucinations, Other (See Comments)  Hallucinations  Hallucinations  Other reaction(s): Hallucinations  Hallucinations    Oxycodone-Acetaminophen Nausea Only and Hallucinations    Pollen Extract Allergic Rhinitis       Social History   Past Surgical History:   Procedure Laterality Date    COLONOSCOPY      DENTAL SURGERY      EYE SURGERY      GASTRIC RESTRICTION SURGERY      for cancer    JOINT REPLACEMENT      KNEE SURGERY Right 01/2017    TKR    LYMPH NODE BIOPSY      STOMACH SURGERY       Family History   Problem Relation Age of Onset    Heart attack Mother     Diabetes Mother     Heart disease Mother     Prostate cancer Father        Objective:  /50 (BP Location: Left arm, Patient Position: Sitting, Cuff Size: Adult)   Pulse (!) 52   Temp 98 2 °F (36 8 °C) (Temporal)   Ht 5' 1" (1 549 m)   Wt 53 7 kg (118 lb 6 4 oz)   SpO2 97%   BMI 22 37 kg/m²   Body mass index is 22 37 kg/m²  Physical Exam  Constitutional:       Appearance: She is well-developed  HENT:      Head: Normocephalic  Right Ear: External ear normal  There is impacted cerumen  Left Ear: External ear normal       Nose: No rhinorrhea  Mouth/Throat:      Pharynx: No posterior oropharyngeal erythema  Eyes:      General: No scleral icterus  Pupils: Pupils are equal, round, and reactive to light  Neck:      Thyroid: No thyromegaly  Trachea: No tracheal deviation  Cardiovascular:      Rate and Rhythm: Normal rate and regular rhythm  Heart sounds: Murmur heard  Comments: Trace bilateral lower extremity edema present  Pulmonary:      Effort: Pulmonary effort is normal  No respiratory distress  Breath sounds: Normal breath sounds  Chest:      Chest wall: No tenderness  Abdominal:      General: Bowel sounds are normal       Palpations: Abdomen is soft  There is no mass  Tenderness: There is no abdominal tenderness  Musculoskeletal:         General: Normal range of motion  Cervical back: Normal range of motion and neck supple  Right lower leg: Edema present  Left lower leg: Edema present  Lymphadenopathy:      Cervical: No cervical adenopathy  Skin:     General: Skin is warm  Neurological:      Mental Status: She is alert and oriented to person, place, and time  Cranial Nerves: No cranial nerve deficit  Psychiatric:         Mood and Affect: Mood normal          Behavior: Behavior normal          Thought Content:  Thought content normal

## 2021-06-14 NOTE — PROGRESS NOTES
Assessment and Plan:     Problem List Items Addressed This Visit     None           Preventive health issues were discussed with patient, and age appropriate screening tests were ordered as noted in patient's After Visit Summary  Personalized health advice and appropriate referrals for health education or preventive services given if needed, as noted in patient's After Visit Summary       History of Present Illness:     Patient presents for Medicare Annual Wellness visit    Patient Care Team:  Vini Costa MD as PCP - Renetta Choi MD as PCP - PCP-[facility] Attributed-Roster     Problem List:     Patient Active Problem List   Diagnosis    Anemia    Essential hypertension    Gastroesophageal reflux disease    Hypothyroidism    Osteoarthritis, hand    Osteoarthritis of right shoulder    Ambulatory dysfunction    Bilateral edema of lower extremity    Bundle-branch block    Hyperglycemia    Hyperlipidemia    Hyponatremia    Labile blood pressure    Lung nodule    Neoplasm of uncertain behavior of lung    Osteoarthrosis of knee    [facility] malignant [facility]um [facility]    S/P total knee replacement    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    Venous (peripheral) insufficiency    Infection of prosthetic joint [facility]    Wound infection    Acute blood loss anemia    Chronic congestive heart failure ([facility]    Diastolic CHF with preserved left ventricular function, NYHA class 2 (HCC)    Nonrheumatic aortic valve stenosis    Depression    Hypercalcemia    Other specified peripheral vascular diseases [facility]    Chronic diastolic (congestive) heart failure ([facility]    Hypertensive heart disease with heart failure [facility])      Past Medical and Surgical History:     Past Medical History:   Diagnosis Date    Allergic     Anemia     Arthritis     Cancer [facility]     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[facility]     Depression 7/15/2020    Disease of thyroid gland     Fatigue     last assessed 8/5/15; resolved 9/30/16    Gastric cancer ([facility] ) 2013    GERD (gastroesophageal reflux disease)     Hyperlipidemia     Hypertension     Osteoporosis     Senile cataract     last assessed 10/18/13; resolved 2/6/15    Situational depression     last assessed 2/11/15; resolved 7/20/15    Visual impairment      Past Surgical History:   Procedure Laterality Date    COLONOSCOPY      DENTAL SURGERY      EYE SURGERY      GASTRIC RESTRICTION SURGERY      for cancer    JOINT REPLACEMENT      KNEE SURGERY Right 01/2017    TKR    LYMPH NODE BIOPSY      STOMACH SURGERY        Family History:     Family History   Problem Relation Age of Onset    Heart attack Mother     Diabetes Mother     Heart disease Mother     Prostate cancer Father       Social History:     Social History     Socioeconomic History    Marital status:      Spouse name: Not on file    Number of children: Not on file    Years of education: Not on file    Highest education level: Not on file   Occupational History    Not on file   Tobacco Use    Smoking status: Never Smoker    Smokeless tobacco: Never Used   Vaping Use    Vaping Use: Never used   Substance and Sexual Activity    Alcohol use: No    Drug use: No    Sexual activity: Not Currently   Other Topics Concern    Not on file   Social History Narrative    DAILY COFFEE CONSUMPTION 1 CUPS      Social Determinants of Health     Financial Resource Strain:     Difficulty of Paying Living Expenses:    Food Insecurity:     Worried About Running Out of Food in the Last Year:     [address] in the Last Year:    Transportation Needs:     Lack of Transportation (Medical):      Lack of Transportation (Non-Medical):    Physical Activity:     Days of Exercise per Week:     Minutes of Exercise per Session:    Stress:     Feeling of Stress :    Social Connections:     Frequency of Communication with Friends and Family:     Frequency of Social Gatherings with Friends and Family:     Attends [employer] Services:     Active Member of Clubs or Organizations:     Attends Club or Organization Meetings:     Marital Status:    Intimate Partner Violence:     Fear of Current or Ex-Partner:     Emotionally Abused:     Physically Abused:     Sexually Abused:       Medications and Allergies:     Current Outpatient Medications   Medication Sig Dispense Refill    acetaminophen (TYLENOL) 500 mg tablet Take 1 tablet by mouth as needed       albuterol (VENTOLIN HFA) 90 mcg/act inhaler Inhale 2 puffs every 6 (six) hours as needed for wheezing (Patient not taking: Reported on 12/1/2020) 1 Inhaler 3    amLODIPine (NORVASC) 5 mg tablet Take 1 tablet (5 mg total) by mouth daily 5 tablet 1    bisoprolol (ZEBETA) 10 MG tablet Take 1 tablet (10 mg total) by mouth every 12 (twelve) hours 10 tablet 1    calcium carbonate (TUMS EX) 750 mg chewable tablet Chew 750 mg Three times daily as needed      carboxymethylcellulose 0 5 % SOLN Apply 1 drop to eye 2 (two) times a day as needed      Cholecalciferol (VITAMIN D3) 1000 units CAPS Take 1 tablet by mouth daily      ciprofloxacin (CILOXAN) 0 3 % ophthalmic solution Administer 1 drop into the left eye every other day        escitalopram (LEXAPRO) 10 mg tablet Take 1 tablet (10 mg total) by mouth daily 5 tablet 1    ferrous sulfate (FeroSul) 325 (65 Fe) mg tablet Take 1 tablet (325 mg total) by mouth 2 (two) times a day with meals      furosemide (LASIX) 20 mg tablet Take 1 tablet (20 mg total) by mouth daily 5 tablet 1    furosemide (LASIX) 40 mg tablet Take 1 tablet (40 mg total) by mouth daily 5 tablet 1    levothyroxine 25 mcg tablet Take 1 tablet (25 mcg total) by mouth daily 5 tablet 1    losartan (COZAAR) 25 mg tablet Take 3 tablets (75 mg total) by mouth daily 5 tablet 1    Multiple Vitamins-Minerals (CENTRUM SILVER 50+WOMEN PO) Take 1 tablet by mouth daily      omeprazole (PriLOSEC) 40 MG capsule Take 1 capsule (40 mg total) by mouth daily 5 capsule 1    polyethylene glycol-propylene glycol (SYSTANE) 0 4-0 3 % Administer 2 drops to both eyes as needed        potassium chloride (K-DUR,KLOR-CON) 20 mEq tablet Take 1 tablet (20 mEq total) by mouth 2 (two) times a day 10 tablet 1    prednisoLONE acetate (PRED FORTE) 1 % ophthalmic suspension Administer 1 drop into the left eye every other day        pyridoxine (VITAMIN B6) 100 mg tablet Take 100 mg by mouth daily         No current facility-administered medications for this visit  Allergies   Allergen Reactions    Latex Rash    Oxycodone Nausea Only, Hallucinations and Other (See Comments)     Hallucinations  Hallucinations  Other reaction(s): Hallucinations  Hallucinations  Hallucinations  Other reaction(s): Hallucinations  Hallucinations  Other reaction(s): Hallucinations, Other (See Comments)  Hallucinations  Hallucinations  Other reaction(s): Hallucinations  Hallucinations    Oxycodone-Acetaminophen Nausea Only and Hallucinations    Pollen Extract Allergic Rhinitis      Immunizations:     Immunization History   Administered Date(s) Administered    INFLUENZA 11/18/2005, 10/20/2006, 11/02/2007, 10/24/2008, 10/15/2010    Influenza Split High Dose Preservative Free IM 11/06/2013, 10/14/2014, 11/18/2015, 12/12/2016, 12/19/2017    Influenza, high dose seasonal 0 7 mL 11/07/2018, 10/07/2019, 12/01/2020    Influenza, seasonal, injectable 12/09/2011, 11/16/2012    Pneumococcal Conjugate 13-Valent 07/21/2015, 12/15/2016    Pneumococcal Polysaccharide PPV23 04/22/2005    SARS-CoV-2 / COVID-19 mRNA IM (Moderna) 02/03/2021, 03/03/2021    Td (adult), adsorbed 04/16/2008      Health Maintenance: There are no preventive care reminders to display for this patient  Topic Date Due    DTaP,Tdap,and Td Vaccines (1 - Tdap) 10/09/1951      Medicare Health Risk Assessment:     There were no vitals taken for this visit       Jane Mackey is here for her Subsequent Wellness visit  Last Medicare Wellness visit information reviewed, patient interviewed and updates made to the record today  Health Risk Assessment:   Patient rates overall health as good  Patient feels that their physical health rating is slightly better  Patient is satisfied with their life  Eyesight was rated as slightly worse  Hearing was rated as slightly worse  Patient feels that their emotional and mental health rating is slightly better  Patients states they are never, rarely angry  Patient states they are always unusually tired/fatigued  Pain experienced in the last 7 days has been some  Patient's pain rating has been 3/10  Patient states that she has experienced weight loss or gain in last 6 months  Depression Screening:   PHQ-2 Score: 2  PHQ-9 Score: 10      Fall Risk Screening: In the past year, patient has experienced: no history of falling in past year      Urinary Incontinence Screening:   Patient has not leaked urine accidently in the last six months  Home Safety:  Patient does not have trouble with stairs inside or outside of their home  Patient has working smoke alarms and has working carbon monoxide detector  Home safety hazards include: none  Nutrition:   Current diet is Regular  Medications:   Patient is currently taking over-the-counter supplements  OTC medications include: see medication list  Patient is not able to manage medications  Activities of Daily Living (ADLs)/Instrumental Activities of Daily Living (IADLs):   Walk and transfer into and out of bed and chair?: Yes  Dress and groom yourself?: Yes    Bathe or shower yourself?: Yes    Feed yourself?  Yes  Do your laundry/housekeeping?: No  Manage your money, pay your bills and track your expenses?: No  Make your own meals?: No    Do your own shopping?: Yes    Previous Hospitalizations:   Any hospitalizations or ED visits within the last 12 months?: No      Advance Care Planning:   Living will: Yes    Durable POA for healthcare: Yes    Advanced directive: Yes    Five wishes given: Yes      Cognitive Screening:   Provider or family/friend/caregiver concerned regarding cognition?: No    PREVENTIVE SCREENINGS      Cardiovascular Screening:    General: Screening Not Indicated and History Lipid Disorder      Diabetes Screening:     General: Screening Current      Colorectal Cancer Screening:     General: Screening Not Indicated      Breast Cancer Screening:     General: Risks and Benefits Discussed and Patient Declines      Cervical Cancer Screening:    General: Screening Not Indicated      Osteoporosis Screening:    General: Patient Declines and Risks and Benefits Discussed      Abdominal Aortic Aneurysm (AAA) Screening:        General: Screening Not Indicated      Lung Cancer Screening:     General: Screening Not Indicated and History Lung Cancer      Hepatitis C Screening:    General: Screening Not Indicated    Screening, Brief Intervention, and Referral to Treatment (SBIRT)    Screening  Typical number of drinks in a day: 0  Typical number of drinks in a week: 0  Interpretation: Low risk drinking behavior  Single Item Drug Screening:  How often have you used an illegal drug (including marijuana) or a prescription medication for non-medical reasons in the past year? never    Single Item Drug Screen Score: 0  Interpretation: Negative screen for possible drug use disorder    Brief Intervention  Alcohol & drug use screenings were reviewed  No concerns regarding substance use disorder identified  Other Counseling Topics:   Car/seat belt/driving safety, skin self-exam, sunscreen and calcium and vitamin D intake and regular weightbearing exercise         Dian Tamez MD

## 2021-06-14 NOTE — PATIENT INSTRUCTIONS
Medicare Preventive Visit Patient Instructions  Thank you for completing your Welcome to Medicare Visit or Medicare Annual Wellness Visit today  Your next wellness visit will be due in one year (6/15/2022)  The screening/preventive services that you may require over the next 5-10 years are detailed below  Some tests may not apply to you based off risk factors and/or age  Screening tests ordered at today's visit but not completed yet may show as past due  Also, please note that scanned in results may not display below  Preventive Screenings:  Service Recommendations Previous Testing/Comments   Colorectal Cancer Screening  * Colonoscopy    * Fecal Occult Blood Test (FOBT)/Fecal Immunochemical Test (FIT)  * Fecal DNA/Cologuard Test  * Flexible Sigmoidoscopy Age: 54-65 years old   Colonoscopy: every 10 years (may be performed more frequently if at higher risk)  OR  FOBT/FIT: every 1 year  OR  Cologuard: every 3 years  OR  Sigmoidoscopy: every 5 years  Screening may be recommended earlier than age 48 if at higher risk for colorectal cancer  Also, an individualized decision between you and your healthcare provider will decide whether screening between the ages of 74-80 would be appropriate  Colonoscopy: 04/25/2016  FOBT/FIT: Not on file  Cologuard: Not on file  Sigmoidoscopy: Not on file          Breast Cancer Screening Age: 36 years old  Frequency: every 1-2 years  Not required if history of left and right mastectomy Mammogram: 04/28/2000        Cervical Cancer Screening Between the ages of 21-29, pap smear recommended once every 3 years  Between the ages of 33-67, can perform pap smear with HPV co-testing every 5 years     Recommendations may differ for women with a history of total hysterectomy, cervical cancer, or abnormal pap smears in past  Pap Smear: Not on file        Hepatitis C Screening Once for adults born between [facility]  More frequently in patients at high risk for Hepatitis C Hep C Antibody: Not on file        Diabetes Screening 1-2 times per year if you're at risk for diabetes or have pre-diabetes Fasting glucose: 91 mg/dL   A1C: 6 1 %        Cholesterol Screening Once every 5 years if you don't have a lipid disorder  May order more often based on risk factors  Lipid panel: 11/02/2018          Other Preventive Screenings Covered by Medicare:  1  Abdominal Aortic Aneurysm (AAA) Screening: covered once if your at risk  You're considered to be at risk if you have a family history of AAA  2  Lung Cancer Screening: covers low dose CT scan once per year if you meet all of the following conditions: (1) Age 50-69; (2) No signs or symptoms of lung cancer; (3) Current smoker or have quit smoking within the last 15 years; (4) You have a tobacco smoking history of at least 30 pack years (packs per day multiplied by number of years you smoked); (5) You get a written order from a healthcare provider  3  Glaucoma Screening: covered annually if you're considered high risk: (1) You have diabetes OR (2) Family history of glaucoma OR (3)  aged 48 and older OR (3)  American aged 72 and older  3  Osteoporosis Screening: covered every 2 years if you meet one of the following conditions: (1) You're estrogen deficient and at risk for osteoporosis based off medical history and other findings; (2) Have a vertebral abnormality; (3) On glucocorticoid therapy for more than 3 months; (4) Have primary hyperparathyroidism; (5) On osteoporosis medications and need to assess response to drug therapy  · Last bone density test (DXA Scan): 08/22/2008  5  HIV Screening: covered annually if you're between the age of 12-76  Also covered annually if you are younger than 13 and older than 72 with risk factors for HIV infection  For pregnant patients, it is covered up to 3 times per pregnancy      Immunizations:  Immunization Recommendations   Influenza Vaccine Annual influenza vaccination during flu season is recommended for all persons aged >= 6 months who do not have contraindications   Pneumococcal Vaccine (Prevnar and Pneumovax)  * Prevnar = PCV13  * Pneumovax = PPSV23   Adults 25-60 years old: 1-3 doses may be recommended based on certain risk factors  Adults 72 years old: Prevnar (PCV13) vaccine recommended followed by Pneumovax (PPSV23) vaccine  If already received PPSV23 since turning 65, then PCV13 recommended at least one year after PPSV23 dose  Hepatitis B Vaccine 3 dose series if at intermediate or high risk (ex: diabetes, end stage renal disease, liver disease)   Tetanus (Td) Vaccine - COST NOT COVERED BY MEDICARE PART B Following completion of primary series, a booster dose should be given every 10 years to maintain immunity against tetanus  Td may also be given as tetanus wound prophylaxis  Tdap Vaccine - COST NOT COVERED BY MEDICARE PART B Recommended at least once for all adults  For pregnant patients, recommended with each pregnancy  Shingles Vaccine (Shingrix) - COST NOT COVERED BY MEDICARE PART B  2 shot series recommended in those aged 48 and above     Health Maintenance Due:  There are no preventive care reminders to display for this patient  Immunizations Due:      Topic Date Due    DTaP,Tdap,and Td Vaccines (1 - Tdap) 10/09/1951     Advance Directives   What are advance directives? Advance directives are legal documents that state your wishes and plans for medical care  These plans are made ahead of time in case you lose your ability to make decisions for yourself  Advance directives can apply to any medical decision, such as the treatments you want, and if you want to donate organs  What are the types of advance directives? There are many types of advance directives, and each state has rules about how to use them  You may choose a combination of any of the following:  · Living will: This is a written record of the treatment you want   You can also choose which treatments you do not want, which to limit, and which to stop at a certain time  This includes surgery, medicine, IV fluid, and tube feedings  · Durable power of  for healthcare [facility]): This is a written record that states who you want to make healthcare choices for you when you are unable to make them for yourself  This person, called a proxy, is usually a family member or a friend  You may choose more than 1 proxy  · Do not resuscitate (DNR) order:  A DNR order is used in case your heart stops beating or you stop breathing  It is a request not to have certain forms of treatment, such as CPR  A DNR order may be included in other types of advance directives  · Medical directive: This covers the care that you want if you are in a coma, near death, or unable to make decisions for yourself  You can list the treatments you want for each condition  Treatment may include pain medicine, surgery, blood transfusions, dialysis, IV or tube feedings, and a ventilator (breathing machine)  · Values history: This document has questions about your views, beliefs, and how you feel and think about life  This information can help others choose the care that you would choose  Why are advance directives important? An advance directive helps you control your care  Although spoken wishes may be used, it is better to have your wishes written down  Spoken wishes can be misunderstood, or not followed  Treatments may be given even if you do not want them  An advance directive may make it easier for your family to make difficult choices about your care  © Copyright [employer] 2018 Information is for End User's use only and may not be sold, redistributed or otherwise used for commercial purposes   All illustrations and images included in CareNotes® are the copyrighted property of A D A M , Inc  or [facility]

## 2021-10-08 ENCOUNTER — TRANSITIONAL CARE MANAGEMENT (OUTPATIENT)
Dept: INTERNAL MEDICINE CLINIC | Age: 86
End: 2021-10-08

## 2021-10-08 ENCOUNTER — TELEPHONE (OUTPATIENT)
Dept: INTERNAL MEDICINE CLINIC | Facility: CLINIC | Age: 86
End: 2021-10-08

## 2021-10-11 ENCOUNTER — TELEPHONE (OUTPATIENT)
Dept: INTERNAL MEDICINE CLINIC | Facility: CLINIC | Age: 86
End: 2021-10-11

## 2021-10-13 ENCOUNTER — OFFICE VISIT (OUTPATIENT)
Dept: INTERNAL MEDICINE CLINIC | Age: 86
End: 2021-10-13
Payer: COMMERCIAL

## 2021-10-13 VITALS
DIASTOLIC BLOOD PRESSURE: 62 MMHG | BODY MASS INDEX: 23.88 KG/M2 | HEIGHT: 61 IN | WEIGHT: 126.5 LBS | SYSTOLIC BLOOD PRESSURE: 186 MMHG | HEART RATE: 72 BPM | TEMPERATURE: 98.4 F | OXYGEN SATURATION: 97 %

## 2021-10-13 DIAGNOSIS — I50.30 DIASTOLIC CHF WITH PRESERVED LEFT VENTRICULAR FUNCTION, NYHA CLASS 2 (HCC): ICD-10-CM

## 2021-10-13 DIAGNOSIS — E03.9 HYPOTHYROIDISM, UNSPECIFIED TYPE: ICD-10-CM

## 2021-10-13 DIAGNOSIS — K21.9 GASTROESOPHAGEAL REFLUX DISEASE WITHOUT ESOPHAGITIS: ICD-10-CM

## 2021-10-13 DIAGNOSIS — R26.2 AMBULATORY DYSFUNCTION: ICD-10-CM

## 2021-10-13 DIAGNOSIS — I50.9 CHRONIC CONGESTIVE HEART FAILURE, UNSPECIFIED HEART FAILURE TYPE (HCC): Primary | ICD-10-CM

## 2021-10-13 DIAGNOSIS — I10 ESSENTIAL HYPERTENSION: ICD-10-CM

## 2021-10-13 PROCEDURE — 99496 TRANSJ CARE MGMT HIGH F2F 7D: CPT | Performed by: INTERNAL MEDICINE

## 2021-10-13 RX ORDER — MIRTAZAPINE 7.5 MG/1
7.5 TABLET, FILM COATED ORAL
COMMUNITY
Start: 2021-10-06 | End: 2021-10-20 | Stop reason: SDUPTHER

## 2021-10-16 ENCOUNTER — APPOINTMENT (OUTPATIENT)
Dept: LAB | Facility: IMAGING CENTER | Age: 86
End: 2021-10-16
Payer: COMMERCIAL

## 2021-10-16 DIAGNOSIS — I50.9 CHRONIC CONGESTIVE HEART FAILURE, UNSPECIFIED HEART FAILURE TYPE (HCC): ICD-10-CM

## 2021-10-16 LAB
ANION GAP SERPL CALCULATED.3IONS-SCNC: 4 MMOL/L (ref 4–13)
BUN SERPL-MCNC: 22 MG/DL (ref 5–25)
CALCIUM SERPL-MCNC: 9.6 MG/DL (ref 8.3–10.1)
CHLORIDE SERPL-SCNC: 102 MMOL/L (ref 100–108)
CO2 SERPL-SCNC: 30 MMOL/L (ref 21–32)
CREAT SERPL-MCNC: 0.94 MG/DL (ref 0.6–1.3)
GFR SERPL CREATININE-BSD FRML MDRD: 53 ML/MIN/1.73SQ M
GLUCOSE P FAST SERPL-MCNC: 107 MG/DL (ref 65–99)
POTASSIUM SERPL-SCNC: 4.3 MMOL/L (ref 3.5–5.3)
SODIUM SERPL-SCNC: 136 MMOL/L (ref 136–145)

## 2021-10-16 PROCEDURE — 80048 BASIC METABOLIC PNL TOTAL CA: CPT

## 2021-10-16 PROCEDURE — 36415 COLL VENOUS BLD VENIPUNCTURE: CPT

## 2021-10-20 ENCOUNTER — OFFICE VISIT (OUTPATIENT)
Dept: INTERNAL MEDICINE CLINIC | Age: 86
End: 2021-10-20
Payer: COMMERCIAL

## 2021-10-20 VITALS
WEIGHT: 117.8 LBS | BODY MASS INDEX: 22.24 KG/M2 | SYSTOLIC BLOOD PRESSURE: 148 MMHG | DIASTOLIC BLOOD PRESSURE: 52 MMHG | TEMPERATURE: 98.3 F | OXYGEN SATURATION: 96 % | HEART RATE: 56 BPM | HEIGHT: 61 IN

## 2021-10-20 DIAGNOSIS — E03.9 HYPOTHYROIDISM, UNSPECIFIED TYPE: ICD-10-CM

## 2021-10-20 DIAGNOSIS — F33.9 DEPRESSION, RECURRENT (HCC): Primary | ICD-10-CM

## 2021-10-20 DIAGNOSIS — I50.30 DIASTOLIC CHF WITH PRESERVED LEFT VENTRICULAR FUNCTION, NYHA CLASS 2 (HCC): ICD-10-CM

## 2021-10-20 DIAGNOSIS — I10 ESSENTIAL HYPERTENSION: ICD-10-CM

## 2021-10-20 DIAGNOSIS — Z23 NEED FOR INFLUENZA VACCINATION: ICD-10-CM

## 2021-10-20 PROCEDURE — 99214 OFFICE O/P EST MOD 30 MIN: CPT | Performed by: INTERNAL MEDICINE

## 2021-10-20 PROCEDURE — 1036F TOBACCO NON-USER: CPT | Performed by: INTERNAL MEDICINE

## 2021-10-20 PROCEDURE — 90662 IIV NO PRSV INCREASED AG IM: CPT

## 2021-10-20 PROCEDURE — 1160F RVW MEDS BY RX/DR IN RCRD: CPT | Performed by: INTERNAL MEDICINE

## 2021-10-20 PROCEDURE — G0008 ADMIN INFLUENZA VIRUS VAC: HCPCS

## 2021-10-20 RX ORDER — BISOPROLOL FUMARATE 10 MG/1
10 TABLET ORAL EVERY 12 HOURS SCHEDULED
Qty: 180 TABLET | Refills: 1 | Status: CANCELLED | OUTPATIENT
Start: 2021-10-20

## 2021-10-20 RX ORDER — MIRTAZAPINE 7.5 MG/1
7.5 TABLET, FILM COATED ORAL
Qty: 30 TABLET | Refills: 0 | Status: SHIPPED | OUTPATIENT
Start: 2021-10-20 | End: 2021-11-22 | Stop reason: SDUPTHER

## 2021-10-20 RX ORDER — LOSARTAN POTASSIUM 50 MG/1
50 TABLET ORAL 2 TIMES DAILY
COMMUNITY
Start: 2021-10-14 | End: 2021-11-16 | Stop reason: SDUPTHER

## 2021-10-20 RX ORDER — LEVOTHYROXINE SODIUM 0.03 MG/1
25 TABLET ORAL DAILY
Qty: 90 TABLET | Refills: 1 | Status: SHIPPED | OUTPATIENT
Start: 2021-10-20 | End: 2022-06-06 | Stop reason: SDUPTHER

## 2021-10-27 DIAGNOSIS — I10 ESSENTIAL HYPERTENSION: ICD-10-CM

## 2021-10-27 RX ORDER — BISOPROLOL FUMARATE 10 MG/1
10 TABLET ORAL DAILY
Qty: 90 TABLET | Refills: 1 | Status: SHIPPED | OUTPATIENT
Start: 2021-10-27 | End: 2021-11-05 | Stop reason: DRUGHIGH

## 2021-11-05 ENCOUNTER — TELEPHONE (OUTPATIENT)
Dept: INTERNAL MEDICINE CLINIC | Facility: CLINIC | Age: 86
End: 2021-11-05

## 2021-11-05 DIAGNOSIS — I10 ESSENTIAL HYPERTENSION: Primary | ICD-10-CM

## 2021-11-05 RX ORDER — BISOPROLOL FUMARATE 5 MG/1
5 TABLET ORAL DAILY
Qty: 90 TABLET | Refills: 1
Start: 2021-11-05 | End: 2021-11-22 | Stop reason: SDUPTHER

## 2021-11-16 DIAGNOSIS — I10 ESSENTIAL HYPERTENSION: Primary | ICD-10-CM

## 2021-11-16 RX ORDER — LOSARTAN POTASSIUM 50 MG/1
50 TABLET ORAL 2 TIMES DAILY
Qty: 180 TABLET | Refills: 1 | Status: SHIPPED | OUTPATIENT
Start: 2021-11-16 | End: 2022-01-31 | Stop reason: ALTCHOICE

## 2021-11-20 ENCOUNTER — APPOINTMENT (OUTPATIENT)
Dept: LAB | Facility: IMAGING CENTER | Age: 86
End: 2021-11-20
Payer: COMMERCIAL

## 2021-11-20 DIAGNOSIS — I10 ESSENTIAL HYPERTENSION: ICD-10-CM

## 2021-11-20 DIAGNOSIS — I50.30 DIASTOLIC CHF WITH PRESERVED LEFT VENTRICULAR FUNCTION, NYHA CLASS 2 (HCC): ICD-10-CM

## 2021-11-20 LAB
ANION GAP SERPL CALCULATED.3IONS-SCNC: 7 MMOL/L (ref 4–13)
BUN SERPL-MCNC: 29 MG/DL (ref 5–25)
CALCIUM SERPL-MCNC: 10 MG/DL (ref 8.3–10.1)
CHLORIDE SERPL-SCNC: 97 MMOL/L (ref 100–108)
CO2 SERPL-SCNC: 31 MMOL/L (ref 21–32)
CREAT SERPL-MCNC: 1.16 MG/DL (ref 0.6–1.3)
ERYTHROCYTE [DISTWIDTH] IN BLOOD BY AUTOMATED COUNT: 14.6 % (ref 11.6–15.1)
GFR SERPL CREATININE-BSD FRML MDRD: 41 ML/MIN/1.73SQ M
GLUCOSE P FAST SERPL-MCNC: 96 MG/DL (ref 65–99)
HCT VFR BLD AUTO: 36.8 % (ref 34.8–46.1)
HGB BLD-MCNC: 11.4 G/DL (ref 11.5–15.4)
MCH RBC QN AUTO: 28.9 PG (ref 26.8–34.3)
MCHC RBC AUTO-ENTMCNC: 31 G/DL (ref 31.4–37.4)
MCV RBC AUTO: 93 FL (ref 82–98)
PLATELET # BLD AUTO: 278 THOUSANDS/UL (ref 149–390)
PMV BLD AUTO: 9.9 FL (ref 8.9–12.7)
POTASSIUM SERPL-SCNC: 4.1 MMOL/L (ref 3.5–5.3)
RBC # BLD AUTO: 3.95 MILLION/UL (ref 3.81–5.12)
SODIUM SERPL-SCNC: 135 MMOL/L (ref 136–145)
WBC # BLD AUTO: 6.46 THOUSAND/UL (ref 4.31–10.16)

## 2021-11-20 PROCEDURE — 85027 COMPLETE CBC AUTOMATED: CPT

## 2021-11-20 PROCEDURE — 36415 COLL VENOUS BLD VENIPUNCTURE: CPT

## 2021-11-20 PROCEDURE — 80048 BASIC METABOLIC PNL TOTAL CA: CPT

## 2021-11-22 ENCOUNTER — OFFICE VISIT (OUTPATIENT)
Dept: INTERNAL MEDICINE CLINIC | Facility: CLINIC | Age: 86
End: 2021-11-22
Payer: COMMERCIAL

## 2021-11-22 VITALS
WEIGHT: 115.8 LBS | OXYGEN SATURATION: 98 % | DIASTOLIC BLOOD PRESSURE: 80 MMHG | HEART RATE: 57 BPM | BODY MASS INDEX: 21.86 KG/M2 | SYSTOLIC BLOOD PRESSURE: 140 MMHG | HEIGHT: 61 IN | TEMPERATURE: 97.1 F

## 2021-11-22 DIAGNOSIS — E44.0 MODERATE PROTEIN-ENERGY MALNUTRITION (HCC): ICD-10-CM

## 2021-11-22 DIAGNOSIS — Z85.028 HISTORY OF GASTRIC CANCER: ICD-10-CM

## 2021-11-22 DIAGNOSIS — I50.30 DIASTOLIC CHF WITH PRESERVED LEFT VENTRICULAR FUNCTION, NYHA CLASS 2 (HCC): Primary | ICD-10-CM

## 2021-11-22 DIAGNOSIS — F33.9 DEPRESSION, RECURRENT (HCC): ICD-10-CM

## 2021-11-22 DIAGNOSIS — I10 ESSENTIAL HYPERTENSION: ICD-10-CM

## 2021-11-22 DIAGNOSIS — E03.9 HYPOTHYROIDISM, UNSPECIFIED TYPE: ICD-10-CM

## 2021-11-22 DIAGNOSIS — N18.31 STAGE 3A CHRONIC KIDNEY DISEASE (HCC): ICD-10-CM

## 2021-11-22 PROCEDURE — 1036F TOBACCO NON-USER: CPT | Performed by: INTERNAL MEDICINE

## 2021-11-22 PROCEDURE — 1160F RVW MEDS BY RX/DR IN RCRD: CPT | Performed by: INTERNAL MEDICINE

## 2021-11-22 PROCEDURE — 99214 OFFICE O/P EST MOD 30 MIN: CPT | Performed by: INTERNAL MEDICINE

## 2021-11-22 RX ORDER — OMEPRAZOLE 40 MG/1
40 CAPSULE, DELAYED RELEASE ORAL DAILY
Qty: 90 CAPSULE | Refills: 1 | Status: SHIPPED | OUTPATIENT
Start: 2021-11-22 | End: 2022-06-17 | Stop reason: SDUPTHER

## 2021-11-22 RX ORDER — BISOPROLOL FUMARATE 5 MG/1
5 TABLET ORAL DAILY
Qty: 90 TABLET | Refills: 1 | Status: SHIPPED | OUTPATIENT
Start: 2021-11-22 | End: 2022-06-17 | Stop reason: SDUPTHER

## 2021-11-22 RX ORDER — MIRTAZAPINE 7.5 MG/1
7.5 TABLET, FILM COATED ORAL
Qty: 90 TABLET | Refills: 1 | Status: SHIPPED | OUTPATIENT
Start: 2021-11-22 | End: 2022-06-17 | Stop reason: SDUPTHER

## 2021-11-26 DIAGNOSIS — I10 ESSENTIAL HYPERTENSION: ICD-10-CM

## 2021-11-26 RX ORDER — AMLODIPINE BESYLATE 5 MG/1
5 TABLET ORAL 2 TIMES DAILY
Qty: 180 TABLET | Refills: 1 | Status: SHIPPED | OUTPATIENT
Start: 2021-11-26 | End: 2022-07-25 | Stop reason: SDUPTHER

## 2021-12-16 DIAGNOSIS — Z71.89 COMPLEX CARE COORDINATION: Primary | ICD-10-CM

## 2021-12-30 ENCOUNTER — TELEPHONE (OUTPATIENT)
Dept: INTERNAL MEDICINE CLINIC | Facility: OTHER | Age: 86
End: 2021-12-30

## 2021-12-30 NOTE — TELEPHONE ENCOUNTER
Patient scheduled for a TCM on 1/5/22 with Dr Magdy Serrato  Discharged today from [facility] for a broken femur

## 2022-01-01 ENCOUNTER — TRANSITIONAL CARE MANAGEMENT (OUTPATIENT)
Dept: INTERNAL MEDICINE CLINIC | Facility: OTHER | Age: 87
End: 2022-01-01

## 2022-01-03 ENCOUNTER — TRANSITIONAL CARE MANAGEMENT (OUTPATIENT)
Dept: INTERNAL MEDICINE CLINIC | Facility: OTHER | Age: 87
End: 2022-01-03

## 2022-01-04 ENCOUNTER — TELEPHONE (OUTPATIENT)
Dept: INTERNAL MEDICINE CLINIC | Facility: OTHER | Age: 87
End: 2022-01-04

## 2022-01-04 NOTE — TELEPHONE ENCOUNTER
Irasema Rendon from [address] calling to make you aware the home care started Saturday and the nurse reported lungs were clear and no edema  Irasema Rendon went out today to see her and he noted crackles in the left lower lobe and bilateral edema  She does admit to increased salt intake and stated she is having post nasal drip at night with a cough  She is continuing to take her lasix 40 mg and the nurse will be back out to see her on Thursday

## 2022-01-06 ENCOUNTER — TELEPHONE (OUTPATIENT)
Dept: OTHER | Facility: OTHER | Age: 87
End: 2022-01-06

## 2022-01-06 NOTE — TELEPHONE ENCOUNTER
Leslie Morales calling back again today with another update  He stated the crackling in her left lower lobe was getting slightly worse but her dry cough was improving  She is still lethargic but no fever  Her BP was 140/52, heart rate 60 and oxygen 96%  She has an appointment scheduled with you on 1/11/22

## 2022-01-06 NOTE — TELEPHONE ENCOUNTER
Patient daughter called and is asking if the office can give her a call in the morning regarding her mom chest x-ray

## 2022-01-07 ENCOUNTER — TELEMEDICINE (OUTPATIENT)
Dept: INTERNAL MEDICINE CLINIC | Age: 87
End: 2022-01-07
Payer: COMMERCIAL

## 2022-01-07 DIAGNOSIS — K21.9 GASTROESOPHAGEAL REFLUX DISEASE WITHOUT ESOPHAGITIS: Primary | ICD-10-CM

## 2022-01-07 DIAGNOSIS — I50.30 DIASTOLIC CHF WITH PRESERVED LEFT VENTRICULAR FUNCTION, NYHA CLASS 2 (HCC): ICD-10-CM

## 2022-01-07 DIAGNOSIS — I50.9 CHRONIC CONGESTIVE HEART FAILURE, UNSPECIFIED HEART FAILURE TYPE (HCC): ICD-10-CM

## 2022-01-07 DIAGNOSIS — E03.9 HYPOTHYROIDISM, UNSPECIFIED TYPE: ICD-10-CM

## 2022-01-07 PROCEDURE — 99214 OFFICE O/P EST MOD 30 MIN: CPT | Performed by: INTERNAL MEDICINE

## 2022-01-10 ENCOUNTER — TELEPHONE (OUTPATIENT)
Dept: INTERNAL MEDICINE CLINIC | Facility: OTHER | Age: 87
End: 2022-01-10

## 2022-01-10 NOTE — TELEPHONE ENCOUNTER
[facility] home care called with update    No weight change, stayed at 117 since last Thursday, right lung clear, continues with crackles to left lung at base  Dry cough is improved slightly;y, increase of leg swelling  Denies shortness of breathe, daughter mentioned about labs ordered from us  They didn't draw bc no orders given  Will see her again Thursday

## 2022-01-11 ENCOUNTER — OFFICE VISIT (OUTPATIENT)
Dept: INTERNAL MEDICINE CLINIC | Facility: OTHER | Age: 87
End: 2022-01-11
Payer: COMMERCIAL

## 2022-01-11 VITALS — WEIGHT: 115 LBS | HEIGHT: 61 IN | BODY MASS INDEX: 21.71 KG/M2

## 2022-01-11 DIAGNOSIS — K21.9 GASTROESOPHAGEAL REFLUX DISEASE WITHOUT ESOPHAGITIS: Primary | ICD-10-CM

## 2022-01-11 DIAGNOSIS — I50.30 DIASTOLIC CHF WITH PRESERVED LEFT VENTRICULAR FUNCTION, NYHA CLASS 2 (HCC): ICD-10-CM

## 2022-01-11 DIAGNOSIS — D50.9 IRON DEFICIENCY ANEMIA, UNSPECIFIED IRON DEFICIENCY ANEMIA TYPE: ICD-10-CM

## 2022-01-11 DIAGNOSIS — E03.9 HYPOTHYROIDISM, UNSPECIFIED TYPE: ICD-10-CM

## 2022-01-11 DIAGNOSIS — N18.31 STAGE 3A CHRONIC KIDNEY DISEASE (HCC): ICD-10-CM

## 2022-01-11 DIAGNOSIS — I10 ESSENTIAL HYPERTENSION: ICD-10-CM

## 2022-01-11 PROCEDURE — 3725F SCREEN DEPRESSION PERFORMED: CPT | Performed by: INTERNAL MEDICINE

## 2022-01-11 PROCEDURE — 1160F RVW MEDS BY RX/DR IN RCRD: CPT | Performed by: INTERNAL MEDICINE

## 2022-01-11 PROCEDURE — 99213 OFFICE O/P EST LOW 20 MIN: CPT | Performed by: INTERNAL MEDICINE

## 2022-01-11 NOTE — PROGRESS NOTES
Virtual Regular Visit    Verification of patient location:    Patient is located in the following state in which I hold an active license [place]      Assessment/Plan:  Chronic diastolic congestive heart failure  Will increase Lasix 60 mg daily  She is due for CBC BMP as soon as possible  Dose of diuretics will be adjusted after BMP to the thyroid available  2  Hypothyroidism  Continue with present dose of levothyroxine    3  Ambulatory dysfunction  Continue with home physical therapy    4  Essential hypertension  The patient is stable on present regimen    Problem List Items Addressed This Visit        Digestive    Gastroesophageal reflux disease - Primary       Endocrine    Hypothyroidism       Cardiovascular and Mediastinum    Essential hypertension    Diastolic CHF with preserved left ventricular function, NYHA class 2 (HCC)       Genitourinary    Stage 3a chronic kidney disease ([facility] [address] )       Other    Anemia               Reason for visit is   Chief Complaint   Patient presents with   Chelsey Lombardi     text [phone]-- follow up broken right femur        Encounter provider Didier Mueller MD    Provider located at [address] [place] [id number]      Recent Visits  Date Type Provider Dept   01/10/22 [facility]   01/07/22 Telemedicine Didier Mueller [address] [place] [address]   01/04/22 Telephone [place]  [facility]   Showing recent visits within past 7 days and meeting all other requirements  Today's Visits  Date Type Provider Dept   01/11/22 Office Visit Didier Mueller MD [facility]   Showing today's visits and meeting all other requirements  Future Appointments  No visits were found meeting these conditions    Showing future appointments within next 150 days and meeting all other requirements       The patient was identified by name and date of birth  Filiberto Bazan was informed that this is a telemedicine visit and that the visit is being conducted through [address] [facility] [address] [facility] and patient was informed that this is a secure, HIPAA-compliant platform  She agrees to proceed     My office door was closed  No one else was in the room  She acknowledged consent and understanding of privacy and security of the video platform  The patient has agreed to participate and understands they can discontinue the visit at any time  Patient is aware this is a billable service  Subjective  Filiberto Bazan is a 80 y o  female shortness of breath is better  Swelling of lower extremities about the same  No fever chills  Appetite is improving         Follow-up visit  Patient was recently admitted to hospital with right femur fracture  On previous visit to the flap furosemide was increased due to worsening CHF and lower extremity edema  She is feeling little bit better  Blood work still pending         Past Medical History:   Diagnosis Date    Allergic     Anemia     Arthritis     Cancer ([facility] [address] [facility]     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[facility] [address] [facility]     Depression 7/15/2020    Disease of thyroid gland     Fatigue     last assessed 8/5/15; resolved 9/30/16    Gastric cancer ([facility] [address] ) 2013    GERD (gastroesophageal reflux disease)     Hyperlipidemia     Hypertension     Osteoporosis     Senile cataract     last assessed 10/18/13; resolved 2/6/15    Situational depression     last assessed 2/11/15; resolved 7/20/15    Visual impairment        Past Surgical History:   Procedure Laterality Date    COLONOSCOPY      DENTAL SURGERY      EYE SURGERY      GASTRIC RESTRICTION SURGERY      for cancer    JOINT REPLACEMENT      KNEE SURGERY Right 01/2017    TKR    LYMPH NODE BIOPSY      STOMACH SURGERY         Current Outpatient Medications   Medication Sig Dispense Refill    acetaminophen (TYLENOL) 500 mg tablet Take 1 tablet by mouth 2 (two) times a day       amLODIPine (NORVASC) 5 mg tablet Take 1 tablet (5 mg total) by mouth 2 (two) times a day 180 tablet 1    bisoprolol (ZEBETA) 5 mg tablet Take 1 tablet (5 mg total) by mouth daily Hold for heart rate < 50/min   90 tablet 1    calcium carbonate (TUMS EX) 750 mg chewable tablet Chew 750 mg Three times daily as needed      carboxymethylcellulose 0 5 % SOLN Apply 1 drop to eye every other day       Cholecalciferol (VITAMIN D3) 1000 units CAPS Take 1 tablet by mouth daily      ciprofloxacin (CILOXAN) 0 3 % ophthalmic solution Administer 1 drop into the left eye every other day        ferrous sulfate (FeroSul) 325 (65 Fe) mg tablet Take 1 tablet (325 mg total) by mouth 2 (two) times a day with meals (Patient taking differently: Take 325 mg by mouth daily with breakfast )      furosemide (LASIX) 40 mg tablet Take 1 tablet (40 mg total) by mouth daily 5 tablet 1    levothyroxine 25 mcg tablet Take 1 tablet (25 mcg total) by mouth daily 90 tablet 1    losartan (COZAAR) 25 mg tablet Take 3 tablets (75 mg total) by mouth daily 5 tablet 1    losartan (COZAAR) 50 mg tablet Take 1 tablet (50 mg total) by mouth 2 (two) times a day 180 tablet 1    Multiple Vitamins-Minerals (OCUVITE PRESERVISION PO) Take 1 tablet by mouth daily      omeprazole (PriLOSEC) 40 MG capsule Take 1 capsule (40 mg total) by mouth daily 90 capsule 1    polyethylene glycol-propylene glycol (SYSTANE) 0 4-0 3 % Administer 2 drops to both eyes as needed        potassium chloride (K-DUR,KLOR-CON) 20 mEq tablet Take 1 tablet (20 mEq total) by mouth 2 (two) times a day 10 tablet 1    prednisoLONE acetate (PRED FORTE) 1 % ophthalmic suspension Administer 1 drop into the left eye every other day        pyridoxine (VITAMIN B6) 100 mg tablet Take 100 mg by mouth daily        Aspirin 325 MG CAPS Take 325 mg by mouth daily        mirtazapine (REMERON) 7 5 MG tablet Take 1 tablet (7 5 mg total) by mouth daily at bedtime 90 tablet 1     No current facility-administered medications for this visit  Allergies   Allergen Reactions    Latex Rash    Oxycodone Nausea Only, Hallucinations and Other (See Comments)     Hallucinations  Hallucinations  Other reaction(s): Hallucinations  Hallucinations  Hallucinations  Other reaction(s): Hallucinations  Hallucinations  Other reaction(s): Hallucinations, Other (See Comments)  Hallucinations  Hallucinations  Other reaction(s): Hallucinations  Hallucinations    Oxycodone-Acetaminophen Nausea Only and Hallucinations    Pollen Extract Allergic Rhinitis       Review of Systems   Constitutional: Negative for fatigue and fever  HENT: Negative for congestion, ear discharge, ear pain, postnasal drip, sinus pressure, sore throat, tinnitus and trouble swallowing  Eyes: Negative for discharge, itching and visual disturbance  Respiratory: Negative for cough and shortness of breath  Cardiovascular: Positive for leg swelling  Negative for chest pain and palpitations  Gastrointestinal: Negative for abdominal pain, diarrhea, nausea and vomiting  Endocrine: Negative for cold intolerance and polyuria  Genitourinary: Negative for difficulty urinating, dysuria and urgency  Musculoskeletal: Positive for gait problem  Negative for arthralgias and neck pain  Skin: Negative for rash  Allergic/Immunologic: Negative for environmental allergies  Neurological: Negative for dizziness, weakness and headaches  Psychiatric/Behavioral: Negative for agitation and behavioral problems  The patient is not nervous/anxious  Video Exam    Vitals:    01/11/22 1420   Weight: 52 2 kg (115 lb)   Height: 5' 1" (1 549 m)       Physical Exam  Nursing note reviewed  Constitutional:       Appearance: She is not ill-appearing  HENT:      Head: Normocephalic  Right Ear: External ear normal       Left Ear: External ear normal       Nose: No rhinorrhea  Mouth/Throat:      Pharynx: No posterior oropharyngeal erythema  Eyes:      Conjunctiva/sclera: Conjunctivae normal    Cardiovascular:      Comments: 1+ bilateral lower extremity edema present  Pulmonary:      Effort: Pulmonary effort is normal  No respiratory distress  Abdominal:      General: There is no distension  Musculoskeletal:      Cervical back: Normal range of motion  Right lower leg: Edema present  Left lower leg: Edema present  Skin:     General: Skin is dry  Neurological:      Mental Status: She is oriented to person, place, and time  Psychiatric:         Mood and Affect: Mood normal          Behavior: Behavior normal           I spent 15 minutes directly with the patient during this visit    VIRTUAL VISIT DISCLAIMER      Kristina Ricketts verbally agrees to participate in [employer]  Pt is aware that [employer] could be limited without vital signs or the ability to perform a full hands-on physical Lindquist Mu understands she or the provider may request at any time to terminate the video visit and request the patient to seek care or treatment in person

## 2022-01-20 ENCOUNTER — TELEPHONE (OUTPATIENT)
Dept: INTERNAL MEDICINE CLINIC | Facility: OTHER | Age: 87
End: 2022-01-20

## 2022-01-20 NOTE — TELEPHONE ENCOUNTER
Spoke to Lazarus Davis her daughter and reviewed her recent labs  Her Hgb is stable at 9 6 (previously 9 9)  she did have a slight decrease in her renal function with the increase to lasix  She states that she was discharged home on  mg after her femur fracture and her nurse recommend transitioning her back to 81 mg on 1/28 which is 30 days s/p her fracture  She is up and walking around with PT and OT per her daughter, I said I agree with 81mg after the 30 days  She asked if it is okay for her to get her covid booster which she was supposed to get the day before her fall  I advised her she may get her booster  She is scheduled to see you for follow up on 1/31  I told her daughter I would let you know the things we discussed and if you had anything to change you would contact her, but otherwise expect to just follow up with you at her visit on 1/31  Thanks!   Amy Ramos

## 2022-01-26 ENCOUNTER — TELEPHONE (OUTPATIENT)
Dept: INTERNAL MEDICINE CLINIC | Age: 87
End: 2022-01-26

## 2022-01-31 ENCOUNTER — OFFICE VISIT (OUTPATIENT)
Dept: INTERNAL MEDICINE CLINIC | Facility: OTHER | Age: 87
End: 2022-01-31
Payer: COMMERCIAL

## 2022-01-31 VITALS
DIASTOLIC BLOOD PRESSURE: 78 MMHG | HEART RATE: 58 BPM | OXYGEN SATURATION: 95 % | BODY MASS INDEX: 22.09 KG/M2 | WEIGHT: 117 LBS | TEMPERATURE: 97.8 F | SYSTOLIC BLOOD PRESSURE: 132 MMHG | HEIGHT: 61 IN

## 2022-01-31 DIAGNOSIS — I50.30 DIASTOLIC CHF WITH PRESERVED LEFT VENTRICULAR FUNCTION, NYHA CLASS 2 (HCC): ICD-10-CM

## 2022-01-31 DIAGNOSIS — F33.9 DEPRESSION, RECURRENT (HCC): ICD-10-CM

## 2022-01-31 DIAGNOSIS — I73.89 OTHER SPECIFIED PERIPHERAL VASCULAR DISEASES (HCC): ICD-10-CM

## 2022-01-31 DIAGNOSIS — E87.1 HYPONATREMIA: ICD-10-CM

## 2022-01-31 DIAGNOSIS — C16.3 MALIGNANT NEOPLASM OF PYLORIC ANTRUM (HCC): ICD-10-CM

## 2022-01-31 DIAGNOSIS — D50.9 IRON DEFICIENCY ANEMIA, UNSPECIFIED IRON DEFICIENCY ANEMIA TYPE: ICD-10-CM

## 2022-01-31 DIAGNOSIS — J96.01 ACUTE RESPIRATORY FAILURE WITH HYPOXIA (HCC): ICD-10-CM

## 2022-01-31 DIAGNOSIS — K21.9 GASTROESOPHAGEAL REFLUX DISEASE WITHOUT ESOPHAGITIS: Primary | ICD-10-CM

## 2022-01-31 DIAGNOSIS — E44.0 MODERATE PROTEIN-CALORIE MALNUTRITION (HCC): ICD-10-CM

## 2022-01-31 DIAGNOSIS — I10 ESSENTIAL HYPERTENSION: ICD-10-CM

## 2022-01-31 DIAGNOSIS — I50.9 CHRONIC CONGESTIVE HEART FAILURE, UNSPECIFIED HEART FAILURE TYPE (HCC): ICD-10-CM

## 2022-01-31 DIAGNOSIS — E03.9 HYPOTHYROIDISM, UNSPECIFIED TYPE: ICD-10-CM

## 2022-01-31 PROCEDURE — 99214 OFFICE O/P EST MOD 30 MIN: CPT | Performed by: INTERNAL MEDICINE

## 2022-01-31 RX ORDER — FUROSEMIDE 40 MG/1
60 TABLET ORAL DAILY
Qty: 45 TABLET | Refills: 6 | Status: SHIPPED | OUTPATIENT
Start: 2022-01-31 | End: 2022-08-05

## 2022-01-31 NOTE — PROGRESS NOTES
Assessment/Plan:    1  Chronic diastolic congestive heart failure  On examination her lungs are significantly better than previous examination  No rales or crepitations  Continue with present dose of furosemide and beta-blocker  Furosemide she is on 60 mg now  2  Mild hyponatremia  Sodium is 134  Will continue to monitor    3  Hypothyroidism  Continue with levothyroxine 25 mcg daily  Will check thyroid function test before next visit    4  Anxiety/depression  Doing well on present regimen    5  Osteoarthritis  Stable  Can use Tylenol p r n     6  GERD  Continue with Prilosec 40 mg daily  On next visit will try to lower the dose to 20 mg daily     Diagnoses and all orders for this visit:    Gastroesophageal reflux disease without esophagitis  -     CBC; Future    Hypothyroidism, unspecified type  -     TSH, 3rd generation with Free T4 reflex; Future    Essential hypertension  -     furosemide (LASIX) 40 mg tablet; Take 1 5 tablets (60 mg total) by mouth daily    Chronic congestive heart failure, unspecified heart failure type ([address] )  -     Basic metabolic panel; Future    Diastolic CHF with preserved left ventricular function, NYHA class 2 (HCC)    Iron deficiency anemia, unspecified iron deficiency anemia type    Hyponatremia  -     Basic metabolic panel; Future    Acute respiratory failure with hypoxia (HCC)    Malignant neoplasm of pyloric antrum (HCC)    Moderate protein-calorie malnutrition (HCC)    Depression, recurrent ([address] )    Other specified peripheral vascular diseases ([address] )               Subjective:          Patient ID: Sridhar Hinojosa is a 80 y o  female  Patient is here for follow-up  Does have blood work done last week  Still complaining of generalized fatigue  No significant shortness of breath    Complaining of mild dry cough      The following portions of the patient's history were reviewed and updated as appropriate: allergies, current medications, past family history, past medical history, past social history, past surgical history and problem list     Review of Systems   Constitutional: Negative for fatigue and fever  HENT: Negative for congestion, ear discharge, ear pain, postnasal drip, sinus pressure, sore throat, tinnitus and trouble swallowing  Eyes: Negative for discharge, itching and visual disturbance  Respiratory: Negative for cough and shortness of breath  Cardiovascular: Negative for chest pain and palpitations  Gastrointestinal: Negative for abdominal pain, diarrhea, nausea and vomiting  Endocrine: Negative for cold intolerance and polyuria  Genitourinary: Negative for difficulty urinating, dysuria and urgency  Musculoskeletal: Positive for arthralgias and gait problem  Negative for neck pain  Skin: Negative for rash  Allergic/Immunologic: Negative for environmental allergies  Neurological: Negative for dizziness, weakness and headaches  Psychiatric/Behavioral: Negative for agitation and behavioral problems  The patient is not nervous/anxious            Past Medical History:   Diagnosis Date    Allergic     Anemia     Arthritis     Cancer ([place] [address] )     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[place] [address] )     Depression 7/15/2020    Disease of thyroid gland     Fatigue     last assessed 8/5/15; resolved 9/30/16    Gastric cancer ([place] [address] ) 2013    GERD (gastroesophageal reflux disease)     Hyperlipidemia     Hypertension     Osteoporosis     Senile cataract     last assessed 10/18/13; resolved 2/6/15    Situational depression     last assessed 2/11/15; resolved 7/20/15    Visual impairment          Current Outpatient Medications:     acetaminophen (TYLENOL) 500 mg tablet, Take 1 tablet by mouth 2 (two) times a day , Disp: , Rfl:     amLODIPine (NORVASC) 5 mg tablet, Take 1 tablet (5 mg total) by mouth 2 (two) times a day, Disp: 180 tablet, Rfl: 1    Aspirin 325 MG CAPS, Take 325 mg by mouth daily  , Disp: , Rfl:     bisoprolol (ZEBETA) 5 mg tablet, Take 1 tablet (5 mg total) by mouth daily Hold for heart rate < 50/min , Disp: 90 tablet, Rfl: 1    calcium carbonate (TUMS EX) 750 mg chewable tablet, Chew 750 mg Three times daily as needed, Disp: , Rfl:     carboxymethylcellulose 0 5 % SOLN, Apply 1 drop to eye every other day , Disp: , Rfl:     Cholecalciferol (VITAMIN D3) 1000 units CAPS, Take 1 tablet by mouth daily, Disp: , Rfl:     ciprofloxacin (CILOXAN) 0 3 % ophthalmic solution, Administer 1 drop into the left eye every other day  , Disp: , Rfl:     ferrous sulfate (FeroSul) 325 (65 Fe) mg tablet, Take 1 tablet (325 mg total) by mouth 2 (two) times a day with meals (Patient taking differently: Take 325 mg by mouth daily with breakfast ), Disp: , Rfl:     furosemide (LASIX) 40 mg tablet, Take 1 5 tablets (60 mg total) by mouth daily, Disp: 45 tablet, Rfl: 6    levothyroxine 25 mcg tablet, Take 1 tablet (25 mcg total) by mouth daily, Disp: 90 tablet, Rfl: 1    Multiple Vitamins-Minerals (OCUVITE PRESERVISION PO), Take 1 tablet by mouth daily, Disp: , Rfl:     omeprazole (PriLOSEC) 40 MG capsule, Take 1 capsule (40 mg total) by mouth daily, Disp: 90 capsule, Rfl: 1    polyethylene glycol-propylene glycol (SYSTANE) 0 4-0 3 %, Administer 2 drops to both eyes as needed  , Disp: , Rfl:     potassium chloride (K-DUR,KLOR-CON) 20 mEq tablet, Take 1 tablet (20 mEq total) by mouth 2 (two) times a day, Disp: 10 tablet, Rfl: 1    prednisoLONE acetate (PRED FORTE) 1 % ophthalmic suspension, Administer 1 drop into the left eye every other day  , Disp: , Rfl:     pyridoxine (VITAMIN B6) 100 mg tablet, Take 100 mg by mouth daily  , Disp: , Rfl:     mirtazapine (REMERON) 7 5 MG tablet, Take 1 tablet (7 5 mg total) by mouth daily at bedtime, Disp: 90 tablet, Rfl: 1    Allergies   Allergen Reactions    Latex Rash    Oxycodone Nausea Only, Hallucinations and Other (See Comments)     Hallucinations  Hallucinations  Other reaction(s): Hallucinations  Hallucinations  Hallucinations  Other reaction(s): Hallucinations  Hallucinations  Other reaction(s): Hallucinations, Other (See Comments)  Hallucinations  Hallucinations  Other reaction(s): Hallucinations  Hallucinations    Oxycodone-Acetaminophen Nausea Only and Hallucinations    Pollen Extract Allergic Rhinitis       Social History   Past Surgical History:   Procedure Laterality Date    COLONOSCOPY      DENTAL SURGERY      EYE SURGERY      GASTRIC RESTRICTION SURGERY      for cancer    JOINT REPLACEMENT      KNEE SURGERY Right 01/2017    TKR    LYMPH NODE BIOPSY      STOMACH SURGERY       Family History   Problem Relation Age of Onset    Heart attack Mother     Diabetes Mother     Heart disease Mother     Prostate cancer Father        Objective:  /78 (BP Location: Left arm, Patient Position: Sitting, Cuff Size: Standard)   Pulse 58   Temp 97 8 °F (36 6 °C) (Temporal)   Ht 5' 1" (1 549 m)   Wt 53 1 kg (117 lb)   SpO2 95%   BMI 22 11 kg/m²   Body mass index is 22 11 kg/m²  Physical Exam  Constitutional:       Appearance: She is well-developed  HENT:      Head: Normocephalic  Right Ear: External ear normal       Left Ear: External ear normal       Nose: No rhinorrhea  Mouth/Throat:      Pharynx: No posterior oropharyngeal erythema  Eyes:      General: No scleral icterus  Pupils: Pupils are equal, round, and reactive to light  Neck:      Thyroid: No thyromegaly  Trachea: No tracheal deviation  Cardiovascular:      Rate and Rhythm: Normal rate and regular rhythm  Heart sounds: Normal heart sounds  Pulmonary:      Effort: Pulmonary effort is normal  No respiratory distress  Breath sounds: Normal breath sounds  No rales  Chest:      Chest wall: No tenderness  Abdominal:      General: Bowel sounds are normal       Palpations: Abdomen is soft  There is no mass  Tenderness: There is no abdominal tenderness     Musculoskeletal: General: Normal range of motion  Cervical back: Normal range of motion and neck supple  Right lower leg: Edema present  Left lower leg: Edema present  Lymphadenopathy:      Cervical: No cervical adenopathy  Skin:     General: Skin is warm  Findings: No erythema  Neurological:      General: No focal deficit present  Mental Status: She is alert and oriented to person, place, and time  Cranial Nerves: No cranial nerve deficit  Psychiatric:         Mood and Affect: Mood normal          Behavior: Behavior normal          Thought Content:  Thought content normal

## 2022-02-03 ENCOUNTER — HOSPITAL ENCOUNTER (OUTPATIENT)
Dept: NON INVASIVE DIAGNOSTICS | Facility: HOSPITAL | Age: 87
Discharge: HOME | End: 2022-02-03
Payer: COMMERCIAL

## 2022-02-03 ENCOUNTER — OFFICE VISIT (OUTPATIENT)
Dept: INTERNAL MEDICINE CLINIC | Facility: OTHER | Age: 87
End: 2022-02-03
Payer: COMMERCIAL

## 2022-02-03 VITALS
WEIGHT: 121.8 LBS | DIASTOLIC BLOOD PRESSURE: 50 MMHG | OXYGEN SATURATION: 95 % | HEIGHT: 61 IN | SYSTOLIC BLOOD PRESSURE: 140 MMHG | BODY MASS INDEX: 23 KG/M2 | HEART RATE: 58 BPM | TEMPERATURE: 97.9 F

## 2022-02-03 DIAGNOSIS — R60.0 EDEMA OF RIGHT LOWER EXTREMITY: Primary | ICD-10-CM

## 2022-02-03 DIAGNOSIS — I50.9 CHRONIC CONGESTIVE HEART FAILURE, UNSPECIFIED HEART FAILURE TYPE (HCC): ICD-10-CM

## 2022-02-03 DIAGNOSIS — R60.0 EDEMA OF RIGHT LOWER EXTREMITY: ICD-10-CM

## 2022-02-03 PROCEDURE — 93971 EXTREMITY STUDY: CPT

## 2022-02-03 PROCEDURE — 99214 OFFICE O/P EST MOD 30 MIN: CPT | Performed by: NURSE PRACTITIONER

## 2022-02-03 NOTE — PROGRESS NOTES
Assessment/Plan:    Problem List Items Addressed This Visit        Cardiovascular and Mediastinum    Chronic congestive heart failure (HCC)     Wt Readings from Last 3 Encounters:   02/03/22 55 2 kg (121 lb 12 8 oz)   01/31/22 53 1 kg (117 lb)   01/11/22 52 2 kg (115 lb)       - continue Lasix 60 mg daily  - suspect weight gain is secondary to right lower extremity edema  - lungs clear without rales               Other    Edema of right lower extremity - Primary     - check stat venous duplex to evaluate for acute DVT  - pt is s/p right femur fracture 1 month ago          Relevant Orders    VAS lower limb venous duplex study, unilateral/limited          BMI Counseling: Body mass index is 23 01 kg/m²  M*Modal software was used to dictate this note  It may contain errors with dictating incorrect words or incorrect spelling  Please contact the provider directly with any questions  Subjective:      Patient ID: Stephanie Davidson is a 80 y o  female  HPI    Patient presents today accompanied by her daughter  They state that her legs are more swollen today, right worse than left  She had PT come today and they mentioned to her daughter that the RLE is more swollen than it was last week and she has tenderness in her calf  Of note she has a hx of congestive heart failure and is on lasix 60mg daily  Her weight is up 4 lbs from her visit 4 days ago  She also had a recent right femur fracture in December and underwent an IM nail and plate done on 73/58 by trauma surgery at [facility]  She then went to [facility] rehab for about 3 weeks  She has been home now for about 1 month  She was on  for 1 month after the surgery  Monday she was transitioned to ASA 81mg  She denies any chest tightness, chest pain, SOB  She sleeps sitting in a chair       The following portions of the patient's history were reviewed and updated as appropriate: allergies, current medications, past family history, past medical history, past social history, past surgical history and problem list     Review of Systems   Constitutional: Negative for chills and fever  Respiratory: Positive for cough (unchanged )  Negative for chest tightness, shortness of breath and wheezing  Cardiovascular: Positive for leg swelling (right > left)  Negative for chest pain           Past Medical History:   Diagnosis Date    Allergic     Anemia     Arthritis     Cancer [facility] )     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[facility] )     Depression 7/15/2020    Disease of thyroid gland     Fatigue     last assessed 8/5/15; resolved 9/30/16    Gastric cancer ([place] [address] ) 2013    GERD (gastroesophageal reflux disease)     Hyperlipidemia     Hypertension     Osteoporosis     Senile cataract     last assessed 10/18/13; resolved 2/6/15    Situational depression     last assessed 2/11/15; resolved 7/20/15    Visual impairment          Current Outpatient Medications:     acetaminophen (TYLENOL) 500 mg tablet, Take 1 tablet by mouth 2 (two) times a day , Disp: , Rfl:     amLODIPine (NORVASC) 5 mg tablet, Take 1 tablet (5 mg total) by mouth 2 (two) times a day, Disp: 180 tablet, Rfl: 1    BABY ASPIRIN PO, Take 81 mg by mouth in the morning, Disp: , Rfl:     bisoprolol (ZEBETA) 5 mg tablet, Take 1 tablet (5 mg total) by mouth daily Hold for heart rate < 50/min , Disp: 90 tablet, Rfl: 1    calcium carbonate (TUMS EX) 750 mg chewable tablet, Chew 750 mg Three times daily as needed, Disp: , Rfl:     carboxymethylcellulose 0 5 % SOLN, Apply 1 drop to eye every other day , Disp: , Rfl:     Cholecalciferol (VITAMIN D3) 1000 units CAPS, Take 1 tablet by mouth daily, Disp: , Rfl:     ciprofloxacin (CILOXAN) 0 3 % ophthalmic solution, Administer 1 drop into the left eye every other day  , Disp: , Rfl:     ferrous sulfate (FeroSul) 325 (65 Fe) mg tablet, Take 1 tablet (325 mg total) by mouth 2 (two) times a day with meals (Patient taking differently: Take 325 mg by mouth daily with breakfast ), Disp: , Rfl:     furosemide (LASIX) 40 mg tablet, Take 1 5 tablets (60 mg total) by mouth daily, Disp: 45 tablet, Rfl: 6    levothyroxine 25 mcg tablet, Take 1 tablet (25 mcg total) by mouth daily, Disp: 90 tablet, Rfl: 1    mirtazapine (REMERON) 7 5 MG tablet, Take 1 tablet (7 5 mg total) by mouth daily at bedtime, Disp: 90 tablet, Rfl: 1    Multiple Vitamins-Minerals (OCUVITE PRESERVISION PO), Take 1 tablet by mouth daily, Disp: , Rfl:     omeprazole (PriLOSEC) 40 MG capsule, Take 1 capsule (40 mg total) by mouth daily, Disp: 90 capsule, Rfl: 1    polyethylene glycol-propylene glycol (SYSTANE) 0 4-0 3 %, Administer 2 drops to both eyes as needed  , Disp: , Rfl:     prednisoLONE acetate (PRED FORTE) 1 % ophthalmic suspension, Administer 1 drop into the left eye every other day  , Disp: , Rfl:     pyridoxine (VITAMIN B6) 100 mg tablet, Take 100 mg by mouth daily  , Disp: , Rfl:     Aspirin 325 MG CAPS, Take 325 mg by mouth daily   (Patient not taking: Reported on 2/3/2022 ), Disp: , Rfl:     potassium chloride (K-DUR,KLOR-CON) 20 mEq tablet, Take 1 tablet (20 mEq total) by mouth 2 (two) times a day (Patient not taking: Reported on 2/3/2022 ), Disp: 10 tablet, Rfl: 1    Allergies   Allergen Reactions    Latex Rash    Oxycodone Nausea Only, Hallucinations and Other (See Comments)     Hallucinations  Hallucinations  Other reaction(s): Hallucinations  Hallucinations  Hallucinations  Other reaction(s): Hallucinations  Hallucinations  Other reaction(s): Hallucinations, Other (See Comments)  Hallucinations  Hallucinations  Other reaction(s): Hallucinations  Hallucinations    Oxycodone-Acetaminophen Nausea Only and Hallucinations    Pollen Extract Allergic Rhinitis       Social History   Past Surgical History:   Procedure Laterality Date    COLONOSCOPY      DENTAL SURGERY      EYE SURGERY      GASTRIC RESTRICTION SURGERY      for cancer    JOINT REPLACEMENT      KNEE SURGERY Right 01/2017    TKR    LYMPH NODE BIOPSY      STOMACH SURGERY       Family History   Problem Relation Age of Onset    Heart attack Mother     Diabetes Mother     Heart disease Mother     Prostate cancer Father        Objective:  /50 (BP Location: Left arm, Patient Position: Sitting, Cuff Size: Standard)   Pulse 58   Temp 97 9 °F (36 6 °C) (Tympanic)   Ht 5' 1" (1 549 m)   Wt 55 2 kg (121 lb 12 8 oz)   SpO2 95% Comment: room  air  BMI 23 01 kg/m²      Physical Exam  Vitals reviewed  Constitutional:       General: She is not in acute distress  Appearance: Normal appearance  She is not diaphoretic  Comments: Seated in R [address]   Cardiovascular:      Rate and Rhythm: Normal rate and regular rhythm  Heart sounds: Murmur heard  Pulmonary:      Effort: Pulmonary effort is normal  No respiratory distress  Breath sounds: Normal breath sounds  No wheezing, rhonchi or rales  Musculoskeletal:         General: Tenderness (right calf) present  Right lower leg: Edema (calf 38 cm) present  Left lower leg: Edema (calf 32 cm) present  Neurological:      Mental Status: She is alert  Mental status is at baseline     Psychiatric:         Mood and Affect: Mood normal          Behavior: Behavior normal

## 2022-02-03 NOTE — ASSESSMENT & PLAN NOTE
Wt Readings from Last 3 Encounters:   02/03/22 55 2 kg (121 lb 12 8 oz)   01/31/22 53 1 kg (117 lb)   01/11/22 52 2 kg (115 lb)       - continue Lasix 60 mg daily  - suspect weight gain is secondary to right lower extremity edema  - lungs clear without rales

## 2022-02-04 ENCOUNTER — TELEPHONE (OUTPATIENT)
Dept: INTERNAL MEDICINE CLINIC | Facility: OTHER | Age: 87
End: 2022-02-04

## 2022-02-04 PROCEDURE — 93971 EXTREMITY STUDY: CPT | Performed by: SURGERY

## 2022-02-04 NOTE — TELEPHONE ENCOUNTER
Called and spoke to pts daughter Anabella Varghese  Advised her no DVT on duplex  D/w dr Albertina Cuello, likely more secondary to her legs beginning in a dependant position all day  Advised to put compression stockings on first thing in the morning  Anabella Varghese states that last night she noticed the swelling had improved with the use of the compression stocking  Unfortunately they needed to cancel their appts today due to [facility] sister passing away this morning  I advised dr Albertina Cuello would like to try to see julian in the office middle of the week next week to follow up on her       Daughter will call back to scheduled

## 2022-02-23 ENCOUNTER — OFFICE VISIT (OUTPATIENT)
Dept: INTERNAL MEDICINE CLINIC | Age: 87
End: 2022-02-23
Payer: COMMERCIAL

## 2022-02-23 VITALS
HEIGHT: 61 IN | TEMPERATURE: 99.1 F | BODY MASS INDEX: 22.81 KG/M2 | OXYGEN SATURATION: 99 % | WEIGHT: 120.8 LBS | HEART RATE: 60 BPM | SYSTOLIC BLOOD PRESSURE: 122 MMHG | DIASTOLIC BLOOD PRESSURE: 64 MMHG

## 2022-02-23 DIAGNOSIS — K21.9 GASTROESOPHAGEAL REFLUX DISEASE WITHOUT ESOPHAGITIS: ICD-10-CM

## 2022-02-23 DIAGNOSIS — L08.9 WOUND INFECTION: ICD-10-CM

## 2022-02-23 DIAGNOSIS — I10 ESSENTIAL HYPERTENSION: ICD-10-CM

## 2022-02-23 DIAGNOSIS — T14.8XXA WOUND INFECTION: ICD-10-CM

## 2022-02-23 DIAGNOSIS — E03.9 HYPOTHYROIDISM, UNSPECIFIED TYPE: Primary | ICD-10-CM

## 2022-02-23 DIAGNOSIS — I87.321 STASIS DERMATITIS OF RIGHT LOWER EXTREMITY DUE TO PERIPHERAL VENOUS HYPERTENSION: ICD-10-CM

## 2022-02-23 DIAGNOSIS — I87.2 VENOUS (PERIPHERAL) INSUFFICIENCY: ICD-10-CM

## 2022-02-23 DIAGNOSIS — I50.30 DIASTOLIC CHF WITH PRESERVED LEFT VENTRICULAR FUNCTION, NYHA CLASS 2 (HCC): ICD-10-CM

## 2022-02-23 PROCEDURE — 1160F RVW MEDS BY RX/DR IN RCRD: CPT | Performed by: INTERNAL MEDICINE

## 2022-02-23 PROCEDURE — 99214 OFFICE O/P EST MOD 30 MIN: CPT | Performed by: INTERNAL MEDICINE

## 2022-02-23 PROCEDURE — 1036F TOBACCO NON-USER: CPT | Performed by: INTERNAL MEDICINE

## 2022-02-23 RX ORDER — POTASSIUM CHLORIDE 750 MG/1
20 CAPSULE, EXTENDED RELEASE ORAL 2 TIMES DAILY
Qty: 60 CAPSULE | Refills: 3 | Status: SHIPPED | OUTPATIENT
Start: 2022-02-23 | End: 2022-06-16 | Stop reason: SDUPTHER

## 2022-02-23 RX ORDER — CEPHALEXIN 500 MG/1
500 CAPSULE ORAL EVERY 8 HOURS SCHEDULED
Qty: 21 CAPSULE | Refills: 0 | Status: SHIPPED | OUTPATIENT
Start: 2022-02-23 | End: 2022-03-02

## 2022-02-23 NOTE — PROGRESS NOTES
Assessment/Plan:    1  Right lower extremity cellulitis/wound  Will start patient on cephalexin 500 mg p o  T i d  For 1 week  Continue with local wound care  Also refer patient to [facility] wound care center because patient is known to them  Patient's daughter will make appointment  2  Chronic diastolic congestive heart failure  Will increase Lasix 40 mg twice a day  Continue with KCl 20 mEq daily  Will check electrolytes before next visit    3  Hypothyroidism  Continue with present dose of levothyroxine  4  GERD  Continue with Prilosec 40 mg daily    Follow-up Keep next scheduled appointment in March     Diagnoses and all orders for this visit:    Hypothyroidism, unspecified type    Gastroesophageal reflux disease without esophagitis    Essential hypertension  -     potassium chloride (MICRO-K) 10 MEQ CR capsule; Take 2 capsules (20 mEq total) by mouth 2 (two) times a day    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    Venous (peripheral) insufficiency    Diastolic CHF with preserved left ventricular function, NYHA class 2 (HCC)    Wound infection  -     cephalexin (KEFLEX) 500 mg capsule; Take 1 capsule (500 mg total) by mouth every 8 (eight) hours for 7 days  -     Ambulatory Referral to Wound Care; Future               Subjective:          Patient ID: Annika Ramirez is a 80 y o  female  Came to office with a complain of worsening swelling of lower extremity especially on right and redness along with wound  No fever chills      The following portions of the patient's history were reviewed and updated as appropriate: allergies, current medications, past family history, past medical history, past social history, past surgical history and problem list     Review of Systems   Constitutional: Negative for fatigue and fever     HENT: Negative for congestion, ear discharge, ear pain, postnasal drip, sinus pressure, sore throat, tinnitus and trouble swallowing  Eyes: Negative for discharge, itching and visual disturbance  Respiratory: Negative for cough and shortness of breath  Cardiovascular: Positive for leg swelling  Negative for chest pain and palpitations  Gastrointestinal: Negative for abdominal pain, diarrhea, nausea and vomiting  Endocrine: Negative for cold intolerance and polyuria  Genitourinary: Negative for difficulty urinating, dysuria and urgency  Musculoskeletal: Negative for arthralgias and neck pain  Skin: Positive for rash and wound  Allergic/Immunologic: Negative for environmental allergies  Neurological: Negative for dizziness, weakness and headaches  Psychiatric/Behavioral: Negative for agitation and behavioral problems  The patient is not nervous/anxious            Past Medical History:   Diagnosis Date    Allergic     Anemia     Arthritis     Cancer [facility] )     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[facility] )     Depression 7/15/2020    Disease of thyroid gland     Fatigue     last assessed 8/5/15; resolved 9/30/16    Gastric cancer ([facility] ) 2013    GERD (gastroesophageal reflux disease)     Hyperlipidemia     Hypertension     Osteoporosis     Senile cataract     last assessed 10/18/13; resolved 2/6/15    Situational depression     last assessed 2/11/15; resolved 7/20/15    Visual impairment          Current Outpatient Medications:     acetaminophen (TYLENOL) 500 mg tablet, Take 1 tablet by mouth 2 (two) times a day , Disp: , Rfl:     amLODIPine (NORVASC) 5 mg tablet, Take 1 tablet (5 mg total) by mouth 2 (two) times a day, Disp: 180 tablet, Rfl: 1    BABY ASPIRIN PO, Take 81 mg by mouth in the morning, Disp: , Rfl:     bisoprolol (ZEBETA) 5 mg tablet, Take 1 tablet (5 mg total) by mouth daily Hold for heart rate < 50/min , Disp: 90 tablet, Rfl: 1    calcium carbonate (TUMS EX) 750 mg chewable tablet, Chew 750 mg Three times daily as needed, Disp: , Rfl:     carboxymethylcellulose 0 5 % SOLN, Apply 1 drop to eye every other day , Disp: , Rfl:     Cholecalciferol (VITAMIN D3) 1000 units CAPS, Take 1 tablet by mouth daily, Disp: , Rfl:     ciprofloxacin (CILOXAN) 0 3 % ophthalmic solution, Administer 1 drop into the left eye every other day  , Disp: , Rfl:     ferrous sulfate (FeroSul) 325 (65 Fe) mg tablet, Take 1 tablet (325 mg total) by mouth 2 (two) times a day with meals (Patient taking differently: Take 325 mg by mouth daily with breakfast ), Disp: , Rfl:     furosemide (LASIX) 40 mg tablet, Take 1 5 tablets (60 mg total) by mouth daily, Disp: 45 tablet, Rfl: 6    levothyroxine 25 mcg tablet, Take 1 tablet (25 mcg total) by mouth daily, Disp: 90 tablet, Rfl: 1    omeprazole (PriLOSEC) 40 MG capsule, Take 1 capsule (40 mg total) by mouth daily, Disp: 90 capsule, Rfl: 1    polyethylene glycol-propylene glycol (SYSTANE) 0 4-0 3 %, Administer 2 drops to both eyes as needed  , Disp: , Rfl:     prednisoLONE acetate (PRED FORTE) 1 % ophthalmic suspension, Administer 1 drop into the left eye every other day  , Disp: , Rfl:     pyridoxine (VITAMIN B6) 100 mg tablet, Take 100 mg by mouth daily  , Disp: , Rfl:     Aspirin 325 MG CAPS, Take 325 mg by mouth daily   (Patient not taking: Reported on 2/3/2022 ), Disp: , Rfl:     cephalexin (KEFLEX) 500 mg capsule, Take 1 capsule (500 mg total) by mouth every 8 (eight) hours for 7 days, Disp: 21 capsule, Rfl: 0    mirtazapine (REMERON) 7 5 MG tablet, Take 1 tablet (7 5 mg total) by mouth daily at bedtime, Disp: 90 tablet, Rfl: 1    Multiple Vitamins-Minerals (OCUVITE PRESERVISION PO), Take 1 tablet by mouth daily (Patient not taking: Reported on 2/23/2022 ), Disp: , Rfl:     potassium chloride (MICRO-K) 10 MEQ CR capsule, Take 2 capsules (20 mEq total) by mouth 2 (two) times a day, Disp: 60 capsule, Rfl: 3    Allergies   Allergen Reactions    Latex Rash    Oxycodone Nausea Only, Hallucinations and Other (See Comments)     Hallucinations  Hallucinations  Other reaction(s): Hallucinations  Hallucinations  Hallucinations  Other reaction(s): Hallucinations  Hallucinations  Other reaction(s): Hallucinations, Other (See Comments)  Hallucinations  Hallucinations  Other reaction(s): Hallucinations  Hallucinations    Oxycodone-Acetaminophen Nausea Only and Hallucinations    Pollen Extract Allergic Rhinitis       Social History   Past Surgical History:   Procedure Laterality Date    COLONOSCOPY      DENTAL SURGERY      EYE SURGERY      GASTRIC RESTRICTION SURGERY      for cancer    JOINT REPLACEMENT      KNEE SURGERY Right 01/2017    TKR    LYMPH NODE BIOPSY      STOMACH SURGERY       Family History   Problem Relation Age of Onset    Heart attack Mother     Diabetes Mother     Heart disease Mother     Prostate cancer Father        Objective:  /64 (BP Location: Left arm, Patient Position: Sitting, Cuff Size: Standard)   Pulse 60   Temp 99 1 °F (37 3 °C) (Temporal)   Ht 5' 1" (1 549 m)   Wt 54 8 kg (120 lb 12 8 oz)   SpO2 99%   BMI 22 82 kg/m²   Body mass index is 22 82 kg/m²  Physical Exam  Constitutional:       Appearance: She is well-developed  HENT:      Head: Normocephalic  Right Ear: External ear normal       Left Ear: External ear normal    Eyes:      General: No scleral icterus  Pupils: Pupils are equal, round, and reactive to light  Neck:      Thyroid: No thyromegaly  Trachea: No tracheal deviation  Cardiovascular:      Rate and Rhythm: Normal rate and regular rhythm  Heart sounds: Normal heart sounds  Pulmonary:      Effort: Pulmonary effort is normal  No respiratory distress  Breath sounds: Normal breath sounds  Chest:      Chest wall: No tenderness  Abdominal:      General: Bowel sounds are normal       Palpations: Abdomen is soft  There is no mass  Tenderness: There is no abdominal tenderness  Musculoskeletal:         General: Normal range of motion        Cervical back: Normal range of motion and neck supple  Right lower leg: Edema present  Left lower leg: Edema present  Lymphadenopathy:      Cervical: No cervical adenopathy  Skin:     General: Skin is warm  Findings: Erythema present  Comments: Wound over medial malleolus over right lower extremity noted  With slight exudate  Neurological:      Mental Status: She is alert and oriented to person, place, and time  Cranial Nerves: No cranial nerve deficit     Psychiatric:         Mood and Affect: Mood normal          Behavior: Behavior normal

## 2022-02-28 ENCOUNTER — TELEPHONE (OUTPATIENT)
Dept: INTERNAL MEDICINE CLINIC | Facility: OTHER | Age: 87
End: 2022-02-28

## 2022-02-28 NOTE — TELEPHONE ENCOUNTER
Please call back home care to give a verbal they want to extend her home care physical therapy  She is at end of certification  They also may need nursing depending on wound care consult  Please call nabeel at [phone]

## 2022-03-01 NOTE — TELEPHONE ENCOUNTER
I called and spoke with daughter Ghislaine Dowd  She indicated that her mother has an open,draing wound on the medial aspect of her ankle  She will be seeing a wound care specialist today  She  Would like to wait for this assessment and recommendation as to extending or temporarily stopping home PT until Roxane Willams is able to actively participate and her pain is controlled  Ghislaine Dowd will call me with an update after the wound care visit  At this time, the therapy extension is on hold  I also called therapist Alexandrea Landaverde with the update

## 2022-03-07 NOTE — TELEPHONE ENCOUNTER
I called daughter Pablo Chan for an update  Her mom is going to wound care every Thursday through the end of the month  Pablo Chan will call me when she sees that her mother will benefit from home physical therapy  Once the wound is healed, her mother will be better and more able to participate in the therapy

## 2022-03-14 ENCOUNTER — APPOINTMENT (OUTPATIENT)
Dept: LAB | Facility: IMAGING CENTER | Age: 87
End: 2022-03-14
Payer: COMMERCIAL

## 2022-03-14 DIAGNOSIS — E03.9 HYPOTHYROIDISM, UNSPECIFIED TYPE: ICD-10-CM

## 2022-03-14 DIAGNOSIS — I50.9 CHRONIC CONGESTIVE HEART FAILURE, UNSPECIFIED HEART FAILURE TYPE (HCC): ICD-10-CM

## 2022-03-14 DIAGNOSIS — E87.1 HYPONATREMIA: ICD-10-CM

## 2022-03-14 DIAGNOSIS — K21.9 GASTROESOPHAGEAL REFLUX DISEASE WITHOUT ESOPHAGITIS: ICD-10-CM

## 2022-03-14 LAB
ANION GAP SERPL CALCULATED.3IONS-SCNC: 4 MMOL/L (ref 4–13)
BUN SERPL-MCNC: 37 MG/DL (ref 5–25)
CALCIUM SERPL-MCNC: 9.7 MG/DL (ref 8.3–10.1)
CHLORIDE SERPL-SCNC: 99 MMOL/L (ref 100–108)
CO2 SERPL-SCNC: 32 MMOL/L (ref 21–32)
CREAT SERPL-MCNC: 1.46 MG/DL (ref 0.6–1.3)
ERYTHROCYTE [DISTWIDTH] IN BLOOD BY AUTOMATED COUNT: 13.6 % (ref 11.6–15.1)
GFR SERPL CREATININE-BSD FRML MDRD: 31 ML/MIN/1.73SQ M
GLUCOSE P FAST SERPL-MCNC: 98 MG/DL (ref 65–99)
HCT VFR BLD AUTO: 37.1 % (ref 34.8–46.1)
HGB BLD-MCNC: 11.3 G/DL (ref 11.5–15.4)
MCH RBC QN AUTO: 28.7 PG (ref 26.8–34.3)
MCHC RBC AUTO-ENTMCNC: 30.5 G/DL (ref 31.4–37.4)
MCV RBC AUTO: 94 FL (ref 82–98)
PLATELET # BLD AUTO: 278 THOUSANDS/UL (ref 149–390)
PMV BLD AUTO: 9.7 FL (ref 8.9–12.7)
POTASSIUM SERPL-SCNC: 4.3 MMOL/L (ref 3.5–5.3)
RBC # BLD AUTO: 3.94 MILLION/UL (ref 3.81–5.12)
SODIUM SERPL-SCNC: 135 MMOL/L (ref 136–145)
T4 FREE SERPL-MCNC: 0.83 NG/DL (ref 0.76–1.46)
TSH SERPL DL<=0.05 MIU/L-ACNC: 4.15 UIU/ML (ref 0.36–3.74)
WBC # BLD AUTO: 5.72 THOUSAND/UL (ref 4.31–10.16)

## 2022-03-14 PROCEDURE — 36415 COLL VENOUS BLD VENIPUNCTURE: CPT

## 2022-03-14 PROCEDURE — 80048 BASIC METABOLIC PNL TOTAL CA: CPT

## 2022-03-14 PROCEDURE — 84439 ASSAY OF FREE THYROXINE: CPT

## 2022-03-14 PROCEDURE — 85027 COMPLETE CBC AUTOMATED: CPT

## 2022-03-14 PROCEDURE — 84443 ASSAY THYROID STIM HORMONE: CPT

## 2022-03-16 ENCOUNTER — OFFICE VISIT (OUTPATIENT)
Dept: INTERNAL MEDICINE CLINIC | Facility: OTHER | Age: 87
End: 2022-03-16
Payer: COMMERCIAL

## 2022-03-16 VITALS
TEMPERATURE: 98.2 F | OXYGEN SATURATION: 95 % | BODY MASS INDEX: 22.66 KG/M2 | DIASTOLIC BLOOD PRESSURE: 58 MMHG | HEART RATE: 54 BPM | HEIGHT: 61 IN | SYSTOLIC BLOOD PRESSURE: 114 MMHG | WEIGHT: 120 LBS

## 2022-03-16 DIAGNOSIS — I35.0 NONRHEUMATIC AORTIC VALVE STENOSIS: ICD-10-CM

## 2022-03-16 DIAGNOSIS — K21.9 GASTROESOPHAGEAL REFLUX DISEASE WITHOUT ESOPHAGITIS: Primary | ICD-10-CM

## 2022-03-16 DIAGNOSIS — E03.9 HYPOTHYROIDISM, UNSPECIFIED TYPE: ICD-10-CM

## 2022-03-16 DIAGNOSIS — I50.32 CHRONIC DIASTOLIC (CONGESTIVE) HEART FAILURE (HCC): ICD-10-CM

## 2022-03-16 DIAGNOSIS — I11.0 HYPERTENSIVE HEART DISEASE WITH HEART FAILURE (HCC): ICD-10-CM

## 2022-03-16 DIAGNOSIS — N18.31 STAGE 3A CHRONIC KIDNEY DISEASE (HCC): ICD-10-CM

## 2022-03-16 DIAGNOSIS — I73.89 OTHER SPECIFIED PERIPHERAL VASCULAR DISEASES (HCC): ICD-10-CM

## 2022-03-16 DIAGNOSIS — I10 ESSENTIAL HYPERTENSION: ICD-10-CM

## 2022-03-16 DIAGNOSIS — I50.30 DIASTOLIC CHF WITH PRESERVED LEFT VENTRICULAR FUNCTION, NYHA CLASS 2 (HCC): ICD-10-CM

## 2022-03-16 PROCEDURE — 1036F TOBACCO NON-USER: CPT | Performed by: INTERNAL MEDICINE

## 2022-03-16 PROCEDURE — 1160F RVW MEDS BY RX/DR IN RCRD: CPT | Performed by: INTERNAL MEDICINE

## 2022-03-16 PROCEDURE — 99214 OFFICE O/P EST MOD 30 MIN: CPT | Performed by: INTERNAL MEDICINE

## 2022-03-16 NOTE — PROGRESS NOTES
Assessment/Plan:    1  Chronic diastolic congestive heart failure  Clinically patient appears more dry  Renal function is also elevated as compared to previous blood work  Will decrease furosemide 40 mg daily and potassium 10 mEq once a day  2  Hypothyroidism  Free T4 is normal   TSH is slightly elevated  Will continue with present dose of levothyroxine  Will repeat thyroid function test in about couple of months    3  Chronic right lower extremity were  Being managed by wound care center    4  CKD stage 3  Renal function is slightly worse than before probably secondary to high-dose of diuretics  Will continue to monitor  Furosemide was decreased 40 mg daily today    5  Nonrheumatic aortic valve stenosis  Stable  No symptoms  Will continue to monitor     Diagnoses and all orders for this visit:    Gastroesophageal reflux disease without esophagitis    Hypothyroidism, unspecified type    Essential hypertension    Diastolic CHF with preserved left ventricular function, NYHA class 2 (HCC)    Nonrheumatic aortic valve stenosis    Hypertensive heart disease with heart failure (HCC)    Chronic diastolic (congestive) heart failure ([facility] )  -     Basic metabolic panel; Future    Other specified peripheral vascular diseases ([facility] )    Stage 3a chronic kidney disease ([facility] )  -     CBC; Future  -     Basic metabolic panel; Future               Subjective:          Patient ID: Andrea Reaves is a 80 y o  female  Patient is here for regular follow-up  Does have blood work done last week and would like to discuss results  Patient is also undergoing wound care management weekly for right lower extremity wound overall feeling better  Weight is stable        The following portions of the patient's history were reviewed and updated as appropriate: allergies, current medications, past family history, past medical history, past social history, past surgical history and problem list     Review of Systems   Constitutional: Negative for fatigue and fever  HENT: Negative for congestion, ear discharge, ear pain, postnasal drip, sinus pressure, sore throat, tinnitus and trouble swallowing  Eyes: Negative for discharge, itching and visual disturbance  Respiratory: Negative for cough and shortness of breath  Cardiovascular: Positive for leg swelling  Negative for chest pain and palpitations  Gastrointestinal: Negative for abdominal pain, diarrhea, nausea and vomiting  Endocrine: Negative for cold intolerance and polyuria  Genitourinary: Negative for difficulty urinating, dysuria and urgency  Musculoskeletal: Positive for arthralgias and gait problem  Negative for neck pain  Skin: Negative for rash  Allergic/Immunologic: Negative for environmental allergies  Neurological: Negative for dizziness, weakness and headaches  Psychiatric/Behavioral: Negative for agitation and behavioral problems  The patient is not nervous/anxious            Past Medical History:   Diagnosis Date    Allergic     Anemia     Arthritis     Cancer ([facility] )     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[facility] )     Depression 7/15/2020    Disease of thyroid gland     Fatigue     last assessed 8/5/15; resolved 9/30/16    Gastric cancer ([place] [address] ) 2013    GERD (gastroesophageal reflux disease)     Hyperlipidemia     Hypertension     Osteoporosis     Senile cataract     last assessed 10/18/13; resolved 2/6/15    Situational depression     last assessed 2/11/15; resolved 7/20/15    Visual impairment          Current Outpatient Medications:     acetaminophen (TYLENOL) 500 mg tablet, Take 1 tablet by mouth 2 (two) times a day , Disp: , Rfl:     amLODIPine (NORVASC) 5 mg tablet, Take 1 tablet (5 mg total) by mouth 2 (two) times a day, Disp: 180 tablet, Rfl: 1    BABY ASPIRIN PO, Take 81 mg by mouth in the morning, Disp: , Rfl:     bisoprolol (ZEBETA) 5 mg tablet, Take 1 tablet (5 mg total) by mouth daily Hold for heart rate < 50/min , Disp: 90 tablet, Rfl: 1   calcium carbonate (TUMS EX) 750 mg chewable tablet, Chew 750 mg Three times daily as needed, Disp: , Rfl:     carboxymethylcellulose 0 5 % SOLN, Apply 1 drop to eye every other day , Disp: , Rfl:     Cholecalciferol (VITAMIN D3) 1000 units CAPS, Take 1 tablet by mouth daily, Disp: , Rfl:     ciprofloxacin (CILOXAN) 0 3 % ophthalmic solution, Administer 1 drop into the left eye every other day  , Disp: , Rfl:     ferrous sulfate (FeroSul) 325 (65 Fe) mg tablet, Take 1 tablet (325 mg total) by mouth 2 (two) times a day with meals (Patient taking differently: Take 325 mg by mouth daily with breakfast ), Disp: , Rfl:     furosemide (LASIX) 40 mg tablet, Take 1 5 tablets (60 mg total) by mouth daily (Patient taking differently: Take 80 mg by mouth daily  ), Disp: 45 tablet, Rfl: 6    levothyroxine 25 mcg tablet, Take 1 tablet (25 mcg total) by mouth daily, Disp: 90 tablet, Rfl: 1    mirtazapine (REMERON) 7 5 MG tablet, Take 1 tablet (7 5 mg total) by mouth daily at bedtime, Disp: 90 tablet, Rfl: 1    omeprazole (PriLOSEC) 40 MG capsule, Take 1 capsule (40 mg total) by mouth daily, Disp: 90 capsule, Rfl: 1    polyethylene glycol-propylene glycol (SYSTANE) 0 4-0 3 %, Administer 2 drops to both eyes as needed  , Disp: , Rfl:     potassium chloride (MICRO-K) 10 MEQ CR capsule, Take 2 capsules (20 mEq total) by mouth 2 (two) times a day, Disp: 60 capsule, Rfl: 3    prednisoLONE acetate (PRED FORTE) 1 % ophthalmic suspension, Administer 1 drop into the left eye every other day  , Disp: , Rfl:     pyridoxine (VITAMIN B6) 100 mg tablet, Take 100 mg by mouth daily  , Disp: , Rfl:     Aspirin 325 MG CAPS, Take 325 mg by mouth daily   (Patient not taking: Reported on 2/3/2022 ), Disp: , Rfl:     Multiple Vitamins-Minerals (OCUVITE PRESERVISION PO), Take 1 tablet by mouth daily (Patient not taking: Reported on 2/23/2022 ), Disp: , Rfl:     Allergies   Allergen Reactions    Latex Rash    Oxycodone Nausea Only, Hallucinations and Other (See Comments)     Hallucinations  Hallucinations  Other reaction(s): Hallucinations  Hallucinations  Hallucinations  Other reaction(s): Hallucinations  Hallucinations  Other reaction(s): Hallucinations, Other (See Comments)  Hallucinations  Hallucinations  Other reaction(s): Hallucinations  Hallucinations    Oxycodone-Acetaminophen Nausea Only and Hallucinations    Pollen Extract Allergic Rhinitis       Social History   Past Surgical History:   Procedure Laterality Date    COLONOSCOPY      DENTAL SURGERY      EYE SURGERY      GASTRIC RESTRICTION SURGERY      for cancer    JOINT REPLACEMENT      KNEE SURGERY Right 01/2017    TKR    LYMPH NODE BIOPSY      STOMACH SURGERY       Family History   Problem Relation Age of Onset    Heart attack Mother     Diabetes Mother     Heart disease Mother     Prostate cancer Father        Objective:  /58 (BP Location: Left arm, Patient Position: Sitting, Cuff Size: Adult)   Pulse (!) 54   Temp 98 2 °F (36 8 °C) (Temporal)   Ht 5' 1" (1 549 m)   Wt 54 4 kg (120 lb)   SpO2 95% Comment: ra  BMI 22 67 kg/m²   Body mass index is 22 67 kg/m²  Physical Exam  Constitutional:       Appearance: She is well-developed  HENT:      Head: Normocephalic  Right Ear: External ear normal       Left Ear: External ear normal       Nose: No rhinorrhea  Eyes:      General: No scleral icterus  Pupils: Pupils are equal, round, and reactive to light  Neck:      Thyroid: No thyromegaly  Trachea: No tracheal deviation  Cardiovascular:      Rate and Rhythm: Normal rate and regular rhythm  Heart sounds: Murmur heard  Comments: No rales on lung examination  Pulmonary:      Effort: Pulmonary effort is normal  No respiratory distress  Breath sounds: Normal breath sounds  Chest:      Chest wall: No tenderness  Abdominal:      General: Bowel sounds are normal       Palpations: Abdomen is soft  There is no mass  Tenderness: There is no abdominal tenderness  Musculoskeletal:         General: Normal range of motion  Cervical back: Normal range of motion and neck supple  Right lower leg: Edema present  Left lower leg: Edema present  Lymphadenopathy:      Cervical: No cervical adenopathy  Skin:     General: Skin is warm  Comments: Right lower extremity wound with dressing  Dressing not removed  Neurological:      Mental Status: She is alert and oriented to person, place, and time  Cranial Nerves: No cranial nerve deficit     Psychiatric:         Mood and Affect: Mood normal          Behavior: Behavior normal

## 2022-05-02 ENCOUNTER — TRANSITIONAL CARE MANAGEMENT (OUTPATIENT)
Dept: INTERNAL MEDICINE CLINIC | Facility: OTHER | Age: 87
End: 2022-05-02

## 2022-05-02 ENCOUNTER — TELEPHONE (OUTPATIENT)
Dept: INTERNAL MEDICINE CLINIC | Facility: OTHER | Age: 87
End: 2022-05-02

## 2022-05-02 NOTE — TELEPHONE ENCOUNTER
Patients daughter calling because patient was in hospital for congestive heart failure and they are now requesting a referral for home health care and home wound care

## 2022-05-04 ENCOUNTER — OFFICE VISIT (OUTPATIENT)
Dept: INTERNAL MEDICINE CLINIC | Facility: OTHER | Age: 87
End: 2022-05-04
Payer: COMMERCIAL

## 2022-05-04 VITALS
DIASTOLIC BLOOD PRESSURE: 58 MMHG | HEIGHT: 61 IN | SYSTOLIC BLOOD PRESSURE: 120 MMHG | OXYGEN SATURATION: 97 % | TEMPERATURE: 98 F | WEIGHT: 118 LBS | HEART RATE: 56 BPM | BODY MASS INDEX: 22.28 KG/M2

## 2022-05-04 DIAGNOSIS — R60.0 BILATERAL EDEMA OF LOWER EXTREMITY: ICD-10-CM

## 2022-05-04 DIAGNOSIS — N18.31 STAGE 3A CHRONIC KIDNEY DISEASE (HCC): ICD-10-CM

## 2022-05-04 DIAGNOSIS — I50.30 DIASTOLIC CHF WITH PRESERVED LEFT VENTRICULAR FUNCTION, NYHA CLASS 2 (HCC): ICD-10-CM

## 2022-05-04 DIAGNOSIS — D50.9 IRON DEFICIENCY ANEMIA, UNSPECIFIED IRON DEFICIENCY ANEMIA TYPE: ICD-10-CM

## 2022-05-04 DIAGNOSIS — I50.9 CHRONIC CONGESTIVE HEART FAILURE, UNSPECIFIED HEART FAILURE TYPE (HCC): ICD-10-CM

## 2022-05-04 DIAGNOSIS — E03.9 HYPOTHYROIDISM, UNSPECIFIED TYPE: ICD-10-CM

## 2022-05-04 DIAGNOSIS — K21.9 GASTROESOPHAGEAL REFLUX DISEASE WITHOUT ESOPHAGITIS: Primary | ICD-10-CM

## 2022-05-04 DIAGNOSIS — E78.2 MIXED HYPERLIPIDEMIA: ICD-10-CM

## 2022-05-04 DIAGNOSIS — I10 ESSENTIAL HYPERTENSION: ICD-10-CM

## 2022-05-04 PROCEDURE — 99496 TRANSJ CARE MGMT HIGH F2F 7D: CPT | Performed by: INTERNAL MEDICINE

## 2022-05-04 NOTE — PROGRESS NOTES
Assessment/Plan:    1  Chronic diastolic congestive heart failure status post pleural effusion  Patient was admitted to [facility] more over underwent thoracentesis about 1 L of pleural fluid was removed  Dose of furosemide was increased from 40 mg to 60 mg daily  Will continue with present regimen  Will follow up on electrolytes    Patient is also being followed by cardiologist tomorrow  2  Hypothyroidism  Continue with present dose of levothyroxine  Will check thyroid function test before next visit      3  Anxiety  Doing well on present regimen    4  Essential hypertension  Blood pressure is stable on present regimen    5  Bilateral lower extremity wounds  Being managed by wound care center  Continue with present dose of diuretics  6  Chronic anemia  Hemoglobin is relatively stable  Will continue to monitor       Diagnoses and all orders for this visit:    Gastroesophageal reflux disease without esophagitis    Hypothyroidism, unspecified type  -     TSH, 3rd generation with Free T4 reflex; Future    Essential hypertension    Chronic congestive heart failure, unspecified [facility] ([facility] )    Diastolic CHF with preserved left ventricular function, NYHA class 2 ([facility])    Stage 3a chronic kidney disease ([facility] [address] )  -     Basic metabolic panel; Future    Iron deficiency anemia, unspecified iron deficiency anemia type  -     CBC; Future    Bilateral edema of lower extremity    Mixed hyperlipidemia          Subjective:          Patient ID: Mona Lancaster is a 80 y o  female      TCM Call (since 4/3/2022)     Date and time call was made  5/2/2022  9:26 AM    Hospital care reviewed  Records reviewed        Patient was hospitialized at  [facility]        Date of Admission  04/27/22    Date of discharge  04/29/22    Diagnosis  acute on chronic diastolic CHF, plueral effusion bilateral    Disposition  Home    Current Symptoms  None      TCM Call (since 4/3/2022)     Post hospital issues None    Scheduled for follow up? Yes    Did you obtain your prescribed medications  Yes    Do you need help managing your prescriptions or medications  Yes    Why type of assitance do you need  Maria Alejandra Hollow and caregiver    Is transportation to your appointment needed  No    I have advised the patient to call PCP with any new or worsening symptoms  Ketan Saul CMA          This is follow-up visit after hospitalization to [facility]   She was admitted with respiratory failure and found to have pleural effusion with congestive heart failure  Underwent thoracentesis  Dose of diuretics was increased  Now she was feeling better  The following portions of the patient's history were reviewed and updated as appropriate: allergies, current medications, past family history, past medical history, past social history, past surgical history and problem list     Review of Systems   Constitutional: Negative for fatigue and fever  HENT: Negative for congestion, ear discharge, ear pain, postnasal drip, sinus pressure, sore throat, tinnitus and trouble swallowing  Eyes: Negative for discharge, itching and visual disturbance  Respiratory: Positive for shortness of breath  Negative for cough  Cardiovascular: Negative for chest pain and palpitations  Gastrointestinal: Negative for abdominal pain, diarrhea, nausea and vomiting  Endocrine: Negative for cold intolerance and polyuria  Genitourinary: Negative for difficulty urinating, dysuria and urgency  Musculoskeletal: Positive for gait problem  Negative for arthralgias and neck pain  Skin: Negative for rash  Allergic/Immunologic: Negative for environmental allergies  Neurological: Negative for dizziness, weakness and headaches  Psychiatric/Behavioral: The patient is not nervous/anxious            Past Medical History:   Diagnosis Date    Allergic     Anemia     Arthritis     [facility] ([facility] )     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     Depression 7/15/2020    Disease of thyroid gland     Fatigue     last assessed 8/5/15; resolved 9/30/16    Gastric cancer ([facility] ) 2013    GERD (gastroesophageal reflux disease)     Hyperlipidemia     Hypertension     Osteoporosis     Senile cataract     last assessed 10/18/13; resolved 2/6/15    Situational depression     last assessed 2/11/15; resolved 7/20/15    Visual impairment          Current Outpatient Medications:     acetaminophen (TYLENOL) 500 mg tablet, Take 1 tablet by mouth 2 (two) times a day , Disp: , Rfl:     amLODIPine (NORVASC) 5 mg tablet, Take 1 tablet (5 mg total) by mouth 2 (two) times a day, Disp: 180 tablet, Rfl: 1    BABY ASPIRIN PO, Take 81 mg by mouth in the morning, Disp: , Rfl:     bisoprolol (ZEBETA) 5 mg tablet, Take 1 tablet (5 mg total) by mouth daily Hold for heart rate < 50/min , Disp: 90 tablet, Rfl: 1    calcium carbonate (TUMS EX) 750 mg chewable tablet, Chew 750 mg Three times daily as needed, Disp: , Rfl:     carboxymethylcellulose 0 5 % SOLN, Apply 1 drop to eye every other day , Disp: , Rfl:     ciprofloxacin (CILOXAN) 0 3 % ophthalmic solution, Administer 1 drop into the left eye every other day  , Disp: , Rfl:     ferrous sulfate (FeroSul) 325 (65 Fe) mg tablet, Take 1 tablet (325 mg total) by mouth 2 (two) times a day with meals (Patient taking differently: Take 325 mg by mouth daily with breakfast ), Disp: , Rfl:     furosemide (LASIX) 40 mg tablet, Take 1 5 tablets (60 mg total) by mouth daily, Disp: 45 tablet, Rfl: 6    levothyroxine 25 mcg tablet, Take 1 tablet (25 mcg total) by mouth daily, Disp: 90 tablet, Rfl: 1    mirtazapine (REMERON) 7 5 MG tablet, Take 1 tablet (7 5 mg total) by mouth daily at bedtime, Disp: 90 tablet, Rfl: 1    Multiple Vitamins-Minerals (OCUVITE PRESERVISION PO), Take 1 tablet by mouth daily  , Disp: , Rfl:     omeprazole (PriLOSEC) 40 MG capsule, Take 1 capsule (40 mg total) by mouth daily, Disp: 90 capsule, Rfl: 1    polyethylene glycol-propylene glycol (SYSTANE) 0 4-0 3 %, Administer 2 drops to both eyes as needed  , Disp: , Rfl:     potassium chloride (MICRO-K) 10 MEQ CR capsule, Take 2 capsules (20 mEq total) by mouth 2 (two) times a day, Disp: 60 capsule, Rfl: 3    prednisoLONE acetate (PRED FORTE) 1 % ophthalmic suspension, Administer 1 drop into the left eye every other day  , Disp: , Rfl:     pyridoxine (VITAMIN B6) 100 mg tablet, Take 100 mg by mouth daily  , Disp: , Rfl:     Aspirin 325 MG CAPS, Take 325 mg by mouth daily   (Patient not taking: Reported on 2/3/2022 ), Disp: , Rfl:     Cholecalciferol (VITAMIN D3) 1000 units CAPS, Take 1 tablet by mouth daily, Disp: , Rfl:     Allergies   Allergen Reactions    Latex Rash    Oxycodone Nausea Only, Hallucinations and Other (See Comments)     Hallucinations  Hallucinations  Other reaction(s): Hallucinations  Hallucinations  Hallucinations  Other reaction(s): Hallucinations  Hallucinations  Other reaction(s): Hallucinations, Other (See Comments)  Hallucinations  Hallucinations  Other reaction(s): Hallucinations  Hallucinations    Oxycodone-Acetaminophen Nausea Only and Hallucinations    Pollen Extract Allergic Rhinitis       Social History   Past Surgical History:   Procedure Laterality Date    COLONOSCOPY      DENTAL SURGERY      EYE SURGERY      GASTRIC RESTRICTION SURGERY      for cancer    JOINT REPLACEMENT      KNEE SURGERY Right 01/2017    TKR    LYMPH NODE BIOPSY      STOMACH SURGERY       Family History   Problem Relation Age of Onset    Heart attack Mother     Diabetes Mother     Heart disease Mother     Prostate cancer Father        Objective:  /58 (BP Location: Left arm, Patient Position: Sitting, Cuff Size: Adult)   Pulse 56   Temp 98 °F (36 7 °C) (Temporal)   Ht 5' 1" (1 549 m)   Wt 53 5 kg (118 lb)   SpO2 97%   BMI 22 30 kg/m²   Body mass index is 22 3 kg/m²  Physical Exam  Constitutional:       Appearance: She is well-developed     HENT: Head: Normocephalic  Right Ear: External ear normal       Left Ear: External ear normal       Nose: No rhinorrhea  Mouth/Throat:      Pharynx: No posterior oropharyngeal erythema  Eyes:      General: No scleral icterus  Pupils: Pupils are equal, round, and reactive to light  Neck:      Thyroid: No thyromegaly  Trachea: No tracheal deviation  Cardiovascular:      Rate and Rhythm: Normal rate and regular rhythm  Heart sounds: Murmur heard  Comments: 2+ bilateral lower extremity edema present  Pulmonary:      Effort: Pulmonary effort is normal  No respiratory distress  Breath sounds: Normal breath sounds  Chest:      Chest wall: No tenderness  Abdominal:      General: Bowel sounds are normal       Palpations: Abdomen is soft  There is no mass  Tenderness: There is no abdominal tenderness  Musculoskeletal:         General: Normal range of motion  Cervical back: Normal range of motion and neck supple  Right lower leg: Edema present  Left lower leg: Edema present  Lymphadenopathy:      Cervical: No cervical adenopathy  Skin:     General: Skin is warm  Neurological:      Mental Status: She is alert and oriented to person, place, and time  Cranial Nerves: No cranial nerve deficit     Psychiatric:         Mood and Affect: Mood normal          Behavior: Behavior normal

## 2022-06-02 ENCOUNTER — RA CDI HCC (OUTPATIENT)
Dept: OTHER | Facility: HOSPITAL | Age: 87
End: 2022-06-02

## 2022-06-02 NOTE — PROGRESS NOTES
[facility]  coding opportunities       Chart reviewed, no opportunity found: CHART REVIEWED, NO OPPORTUNITY FOUND        Patients Insurance     Medicare Insurance: [employer] [facility] [employer]

## 2022-06-06 DIAGNOSIS — E03.9 HYPOTHYROIDISM, UNSPECIFIED TYPE: ICD-10-CM

## 2022-06-06 RX ORDER — LEVOTHYROXINE SODIUM 0.03 MG/1
25 TABLET ORAL DAILY
Qty: 90 TABLET | Refills: 1 | Status: SHIPPED | OUTPATIENT
Start: 2022-06-06

## 2022-06-07 ENCOUNTER — OFFICE VISIT (OUTPATIENT)
Dept: INTERNAL MEDICINE CLINIC | Facility: CLINIC | Age: 87
End: 2022-06-07
Payer: COMMERCIAL

## 2022-06-07 VITALS
BODY MASS INDEX: 22.28 KG/M2 | SYSTOLIC BLOOD PRESSURE: 136 MMHG | OXYGEN SATURATION: 97 % | TEMPERATURE: 98.8 F | HEART RATE: 52 BPM | HEIGHT: 61 IN | WEIGHT: 118 LBS | DIASTOLIC BLOOD PRESSURE: 62 MMHG

## 2022-06-07 DIAGNOSIS — U07.1 LAB TEST POSITIVE FOR DETECTION OF COVID-19 VIRUS: Primary | ICD-10-CM

## 2022-06-07 DIAGNOSIS — R05.9 COUGH: ICD-10-CM

## 2022-06-07 PROBLEM — S91.001A WOUND OF RIGHT ANKLE: Status: ACTIVE | Noted: 2022-04-27

## 2022-06-07 PROBLEM — S22.050D CLOSED WEDGE COMPRESSION FRACTURE OF T6 VERTEBRA WITH ROUTINE HEALING: Status: ACTIVE | Noted: 2021-09-13

## 2022-06-07 PROBLEM — S22.040D CLOSED WEDGE COMPRESSION FRACTURE OF T4 VERTEBRA WITH ROUTINE HEALING: Status: ACTIVE | Noted: 2021-09-13

## 2022-06-07 LAB
SARS-COV-2 AG UPPER RESP QL IA: POSITIVE
VALID CONTROL: ABNORMAL

## 2022-06-07 PROCEDURE — 87811 SARS-COV-2 COVID19 W/OPTIC: CPT | Performed by: INTERNAL MEDICINE

## 2022-06-07 PROCEDURE — 1036F TOBACCO NON-USER: CPT | Performed by: INTERNAL MEDICINE

## 2022-06-07 PROCEDURE — 99213 OFFICE O/P EST LOW 20 MIN: CPT | Performed by: INTERNAL MEDICINE

## 2022-06-07 PROCEDURE — 1160F RVW MEDS BY RX/DR IN RCRD: CPT | Performed by: INTERNAL MEDICINE

## 2022-06-07 RX ORDER — FUROSEMIDE 20 MG/1
20 TABLET ORAL DAILY
COMMUNITY
Start: 2022-05-05

## 2022-06-07 NOTE — PROGRESS NOTES
Assessment/Plan:    Cough  Symptomatic treatment with over-the-counter cough suppressant, Cepacol/Cepastat lozenges    Lab test positive for detection of COVID-19 virus  Patient is high risk for hospitalization given her age and underlying cardiac issues  Patient was given a prescription for Paxlovid, adjusted for her EGFR of47  Two tablets twice daily for 5 days       Diagnoses and all orders for this visit:    Lab test positive for detection of COVID-19 virus  -     nirmatrelvir & ritonavir (Paxlovid) tablet therapy pack; Take 2 tablets by mouth 2 (two) times a day for 5 days Take 1 nirmatrelvir tablet + 1 ritonavir tablet together per dose    Cough    Other orders  -     furosemide (LASIX) 20 mg tablet; Take 20 mg by mouth daily Total 60 mg daily as per cardiology          Subjective:      Patient ID: Elaine Kohler is a 80 y o  female  70-year-old female is brought to the office as of not feeling well, associated with a new onset of cough and sore throat  She has had no fever  Her home health aide was recently hospitalized with pneumonia and supposedly tested COVID negative  A rapid COVID test was performed in the office and was strongly positive  Patient reports no other symptoms  She does not appear ill  She denies any dyspnea  Cough is nonproductive  She has not experienced any chills or night sweats  She has not experienced any gastrointestinal symptoms  She denies headache  Family History   Problem Relation Age of Onset    Heart attack Mother     Diabetes Mother     Heart disease Mother     Prostate cancer Father      Social History     Socioeconomic History    Marital status:       Spouse name: Not on file    Number of children: Not on file    Years of education: Not on file    Highest education level: Not on file   Occupational History    Not on file   Tobacco Use    Smoking status: Never Smoker    Smokeless tobacco: Never Used   Vaping Use    Vaping Use: Never used Substance and Sexual Activity    Alcohol use: No    Drug use: No    Sexual activity: Not Currently   Other Topics Concern    Not on file   Social History Narrative    DAILY COFFEE CONSUMPTION 1 CUPS      Social Determinants of Health     Financial Resource Strain: Not on file   Food Insecurity: Not on file   Transportation Needs: Not on file   Physical Activity: Not on file   Stress: Not on file   Social Connections: Not on file   Intimate Partner Violence: Not on file   Housing Stability: Not on file     Past Medical History:   Diagnosis Date    Allergic     Anemia     Arthritis     Cancer ([facility] )     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[facility] )     Depression 7/15/2020    Disease of thyroid gland     Fatigue     last assessed 8/5/15; resolved 9/30/16    Gastric cancer ([facility] ) 2013    GERD (gastroesophageal reflux disease)     Hyperlipidemia     Hypertension     Osteoporosis     Senile cataract     last assessed 10/18/13; resolved 2/6/15    Situational depression     last assessed 2/11/15; resolved 7/20/15    Visual impairment        Current Outpatient Medications:     acetaminophen (TYLENOL) 500 mg tablet, Take 1 tablet by mouth 2 (two) times a day , Disp: , Rfl:     amLODIPine (NORVASC) 5 mg tablet, Take 1 tablet (5 mg total) by mouth 2 (two) times a day, Disp: 180 tablet, Rfl: 1    BABY ASPIRIN PO, Take 81 mg by mouth in the morning, Disp: , Rfl:     bisoprolol (ZEBETA) 5 mg tablet, Take 1 tablet (5 mg total) by mouth daily Hold for heart rate < 50/min , Disp: 90 tablet, Rfl: 1    calcium carbonate (TUMS EX) 750 mg chewable tablet, Chew 750 mg Three times daily as needed, Disp: , Rfl:     carboxymethylcellulose 0 5 % SOLN, Apply 1 drop to eye every other day , Disp: , Rfl:     Cholecalciferol (VITAMIN D3) 1000 units CAPS, Take 1 tablet by mouth daily, Disp: , Rfl:     ciprofloxacin (CILOXAN) 0 3 % ophthalmic solution, Administer 1 drop into the left eye every other day  , Disp: , Rfl:     ferrous sulfate (FeroSul) 325 (65 Fe) mg tablet, Take 1 tablet (325 mg total) by mouth 2 (two) times a day with meals (Patient taking differently: Take 325 mg by mouth daily with breakfast), Disp: , Rfl:     furosemide (LASIX) 20 mg tablet, Take 20 mg by mouth daily Total 60 mg daily as per cardiology, Disp: , Rfl:     furosemide (LASIX) 40 mg tablet, Take 1 5 tablets (60 mg total) by mouth daily, Disp: 45 tablet, Rfl: 6    levothyroxine 25 mcg tablet, Take 1 tablet (25 mcg total) by mouth daily, Disp: 90 tablet, Rfl: 1    mirtazapine (REMERON) 7 5 MG tablet, Take 1 tablet (7 5 mg total) by mouth daily at bedtime, Disp: 90 tablet, Rfl: 1    nirmatrelvir & ritonavir (Paxlovid) tablet therapy pack, Take 2 tablets by mouth 2 (two) times a day for 5 days Take 1 nirmatrelvir tablet + 1 ritonavir tablet together per dose, Disp: 20 tablet, Rfl: 0    omeprazole (PriLOSEC) 40 MG capsule, Take 1 capsule (40 mg total) by mouth daily, Disp: 90 capsule, Rfl: 1    polyethylene glycol-propylene glycol (SYSTANE) 0 4-0 3 %, Administer 2 drops to both eyes as needed  , Disp: , Rfl:     potassium chloride (MICRO-K) 10 MEQ CR capsule, Take 2 capsules (20 mEq total) by mouth 2 (two) times a day, Disp: 60 capsule, Rfl: 3    prednisoLONE acetate (PRED FORTE) 1 % ophthalmic suspension, Administer 1 drop into the left eye every other day  , Disp: , Rfl:     pyridoxine (VITAMIN B6) 100 mg tablet, Take 100 mg by mouth daily  , Disp: , Rfl:     Aspirin 325 MG CAPS, Take 325 mg by mouth daily, Disp: , Rfl:     Multiple Vitamins-Minerals (OCUVITE PRESERVISION PO), Take 1 tablet by mouth daily   (Patient not taking: Reported on 6/7/2022), Disp: , Rfl:   Allergies   Allergen Reactions    Latex Rash    Oxycodone Nausea Only, Hallucinations and Other (See Comments)     Hallucinations  Hallucinations  Other reaction(s): Hallucinations  Hallucinations  Hallucinations  Other reaction(s): Hallucinations  Hallucinations  Other reaction(s): Hallucinations, Other (See Comments)  Hallucinations  Hallucinations  Other reaction(s): Hallucinations  Hallucinations    Oxycodone-Acetaminophen Nausea Only and Hallucinations    Pollen Extract Allergic Rhinitis     Past Surgical History:   Procedure Laterality Date    COLONOSCOPY      DENTAL SURGERY      EYE SURGERY      GASTRIC RESTRICTION SURGERY      for cancer    JOINT REPLACEMENT      KNEE SURGERY Right 01/2017    TKR    LYMPH NODE BIOPSY      STOMACH SURGERY           Review of Systems   Constitutional: Negative  HENT: Positive for sore throat  Eyes: Negative  Respiratory: Positive for cough  Negative for shortness of breath, wheezing and stridor  Cardiovascular: Negative  Gastrointestinal: Negative  Genitourinary: Negative  Musculoskeletal: Negative  Skin: Negative  Neurological: Negative  Psychiatric/Behavioral: Negative  Objective:      /62 (BP Location: Left arm, Patient Position: Sitting, Cuff Size: Standard)   Pulse (!) 52   Temp 98 8 °F (37 1 °C) (Tympanic)   Ht 5' 1" (1 549 m)   Wt 53 5 kg (118 lb)   SpO2 97%   BMI 22 30 kg/m²          Physical Exam  Vitals reviewed  Constitutional:       General: She is not in acute distress  Appearance: Normal appearance  She is normal weight  She is not ill-appearing, toxic-appearing or diaphoretic  HENT:      Head: Normocephalic and atraumatic  Right Ear: External ear normal       Left Ear: External ear normal       Nose: Nose normal    Eyes:      Conjunctiva/sclera: Conjunctivae normal       Pupils: Pupils are equal, round, and reactive to light  Cardiovascular:      Rate and Rhythm: Normal rate and regular rhythm  Pulses: Normal pulses  Heart sounds: Normal heart sounds  No murmur heard  Pulmonary:      Effort: Pulmonary effort is normal  No respiratory distress  Breath sounds: No stridor  Rales (Scant bibasilar rales) present  No wheezing or rhonchi        Comments: Decreased breath sounds in both lung fields  Abdominal:      General: Abdomen is flat  Tenderness: There is no abdominal tenderness  Musculoskeletal:         General: No swelling  Cervical back: Neck supple  No rigidity  Right lower leg: No edema  Left lower leg: No edema  Skin:     General: Skin is warm  Capillary Refill: Capillary refill takes less than 2 seconds  Coloration: Skin is not jaundiced  Findings: No bruising, erythema or rash  Neurological:      General: No focal deficit present  Mental Status: She is alert and oriented to person, place, and time  Mental status is at baseline     Psychiatric:         Mood and Affect: Mood normal          Behavior: Behavior normal

## 2022-06-07 NOTE — ASSESSMENT & PLAN NOTE
Patient is high risk for hospitalization given her age and underlying cardiac issues  Patient was given a prescription for Paxlovid, adjusted for her EGFR of47   Two tablets twice daily for 5 days

## 2022-06-11 ENCOUNTER — APPOINTMENT (OUTPATIENT)
Dept: LAB | Facility: IMAGING CENTER | Age: 87
End: 2022-06-11
Payer: COMMERCIAL

## 2022-06-11 DIAGNOSIS — D50.9 IRON DEFICIENCY ANEMIA, UNSPECIFIED IRON DEFICIENCY ANEMIA TYPE: ICD-10-CM

## 2022-06-11 DIAGNOSIS — N18.31 STAGE 3A CHRONIC KIDNEY DISEASE (HCC): ICD-10-CM

## 2022-06-11 DIAGNOSIS — E03.9 HYPOTHYROIDISM, UNSPECIFIED TYPE: ICD-10-CM

## 2022-06-11 LAB
ANION GAP SERPL CALCULATED.3IONS-SCNC: 2 MMOL/L (ref 4–13)
BUN SERPL-MCNC: 24 MG/DL (ref 5–25)
CALCIUM SERPL-MCNC: 9.7 MG/DL (ref 8.3–10.1)
CHLORIDE SERPL-SCNC: 96 MMOL/L (ref 100–108)
CO2 SERPL-SCNC: 32 MMOL/L (ref 21–32)
CREAT SERPL-MCNC: 1.35 MG/DL (ref 0.6–1.3)
ERYTHROCYTE [DISTWIDTH] IN BLOOD BY AUTOMATED COUNT: 14.5 % (ref 11.6–15.1)
GFR SERPL CREATININE-BSD FRML MDRD: 34 ML/MIN/1.73SQ M
GLUCOSE P FAST SERPL-MCNC: 91 MG/DL (ref 65–99)
HCT VFR BLD AUTO: 34.8 % (ref 34.8–46.1)
HGB BLD-MCNC: 11 G/DL (ref 11.5–15.4)
MCH RBC QN AUTO: 29.5 PG (ref 26.8–34.3)
MCHC RBC AUTO-ENTMCNC: 31.6 G/DL (ref 31.4–37.4)
MCV RBC AUTO: 93 FL (ref 82–98)
PLATELET # BLD AUTO: 310 THOUSANDS/UL (ref 149–390)
PMV BLD AUTO: 9.5 FL (ref 8.9–12.7)
POTASSIUM SERPL-SCNC: 4.5 MMOL/L (ref 3.5–5.3)
RBC # BLD AUTO: 3.73 MILLION/UL (ref 3.81–5.12)
SODIUM SERPL-SCNC: 130 MMOL/L (ref 136–145)
T4 FREE SERPL-MCNC: 0.94 NG/DL (ref 0.76–1.46)
TSH SERPL DL<=0.05 MIU/L-ACNC: 4.52 UIU/ML (ref 0.45–4.5)
WBC # BLD AUTO: 5.18 THOUSAND/UL (ref 4.31–10.16)

## 2022-06-11 PROCEDURE — 84439 ASSAY OF FREE THYROXINE: CPT

## 2022-06-11 PROCEDURE — 85027 COMPLETE CBC AUTOMATED: CPT

## 2022-06-11 PROCEDURE — 80048 BASIC METABOLIC PNL TOTAL CA: CPT

## 2022-06-11 PROCEDURE — 84443 ASSAY THYROID STIM HORMONE: CPT

## 2022-06-11 PROCEDURE — 36415 COLL VENOUS BLD VENIPUNCTURE: CPT

## 2022-06-14 ENCOUNTER — TELEPHONE (OUTPATIENT)
Dept: INTERNAL MEDICINE CLINIC | Facility: OTHER | Age: 87
End: 2022-06-14

## 2022-06-15 ENCOUNTER — TELEPHONE (OUTPATIENT)
Dept: INTERNAL MEDICINE CLINIC | Facility: OTHER | Age: 87
End: 2022-06-15

## 2022-06-15 NOTE — TELEPHONE ENCOUNTER
Patient being discharged from [facility] today for congestive heart failure and will need a TCM scheduled  Please call daughter Russ Aburto to get that scheduled

## 2022-06-16 ENCOUNTER — TELEPHONE (OUTPATIENT)
Dept: INTERNAL MEDICINE CLINIC | Facility: OTHER | Age: 87
End: 2022-06-16

## 2022-06-16 ENCOUNTER — TRANSITIONAL CARE MANAGEMENT (OUTPATIENT)
Dept: INTERNAL MEDICINE CLINIC | Facility: OTHER | Age: 87
End: 2022-06-16

## 2022-06-16 DIAGNOSIS — I10 ESSENTIAL HYPERTENSION: ICD-10-CM

## 2022-06-16 RX ORDER — POTASSIUM CHLORIDE 750 MG/1
20 CAPSULE, EXTENDED RELEASE ORAL 2 TIMES DAILY
Qty: 360 CAPSULE | Refills: 0 | Status: SHIPPED | OUTPATIENT
Start: 2022-06-16 | End: 2022-09-14

## 2022-06-17 DIAGNOSIS — I10 ESSENTIAL HYPERTENSION: ICD-10-CM

## 2022-06-17 DIAGNOSIS — F33.9 DEPRESSION, RECURRENT (HCC): ICD-10-CM

## 2022-06-17 DIAGNOSIS — Z85.028 HISTORY OF GASTRIC CANCER: ICD-10-CM

## 2022-06-17 RX ORDER — OMEPRAZOLE 40 MG/1
40 CAPSULE, DELAYED RELEASE ORAL DAILY
Qty: 90 CAPSULE | Refills: 1 | Status: SHIPPED | OUTPATIENT
Start: 2022-06-17 | End: 2022-01-01 | Stop reason: SDUPTHER

## 2022-06-17 RX ORDER — MIRTAZAPINE 7.5 MG/1
7.5 TABLET, FILM COATED ORAL
Qty: 90 TABLET | Refills: 1 | Status: SHIPPED | OUTPATIENT
Start: 2022-06-17 | End: 2022-01-01 | Stop reason: SDUPTHER

## 2022-06-17 RX ORDER — BISOPROLOL FUMARATE 5 MG/1
5 TABLET, FILM COATED ORAL DAILY
Qty: 90 TABLET | Refills: 1 | Status: SHIPPED | OUTPATIENT
Start: 2022-06-17 | End: 2022-01-01 | Stop reason: SDUPTHER

## 2022-06-21 ENCOUNTER — HOSPITAL ENCOUNTER (OUTPATIENT)
Dept: RADIOLOGY | Facility: IMAGING CENTER | Age: 87
Discharge: HOME | End: 2022-06-21
Payer: COMMERCIAL

## 2022-06-21 ENCOUNTER — OFFICE VISIT (OUTPATIENT)
Dept: INTERNAL MEDICINE CLINIC | Facility: OTHER | Age: 87
End: 2022-06-21
Payer: COMMERCIAL

## 2022-06-21 VITALS
BODY MASS INDEX: 21.9 KG/M2 | HEART RATE: 60 BPM | DIASTOLIC BLOOD PRESSURE: 70 MMHG | TEMPERATURE: 97.8 F | HEIGHT: 61 IN | SYSTOLIC BLOOD PRESSURE: 128 MMHG | WEIGHT: 116 LBS | OXYGEN SATURATION: 97 %

## 2022-06-21 DIAGNOSIS — I50.31 ACUTE DIASTOLIC HEART FAILURE (HCC): ICD-10-CM

## 2022-06-21 DIAGNOSIS — E03.9 HYPOTHYROIDISM, UNSPECIFIED TYPE: ICD-10-CM

## 2022-06-21 DIAGNOSIS — U07.1 COVID-19 VIRUS INFECTION: ICD-10-CM

## 2022-06-21 DIAGNOSIS — I10 ESSENTIAL HYPERTENSION: ICD-10-CM

## 2022-06-21 DIAGNOSIS — I87.321 STASIS DERMATITIS OF RIGHT LOWER EXTREMITY DUE TO PERIPHERAL VENOUS HYPERTENSION: ICD-10-CM

## 2022-06-21 DIAGNOSIS — I50.32 CHRONIC DIASTOLIC HEART FAILURE (HCC): ICD-10-CM

## 2022-06-21 DIAGNOSIS — K21.9 GASTROESOPHAGEAL REFLUX DISEASE WITHOUT ESOPHAGITIS: Primary | ICD-10-CM

## 2022-06-21 DIAGNOSIS — I50.30 DIASTOLIC CHF WITH PRESERVED LEFT VENTRICULAR FUNCTION, NYHA CLASS 2 (HCC): ICD-10-CM

## 2022-06-21 PROCEDURE — 99496 TRANSJ CARE MGMT HIGH F2F 7D: CPT | Performed by: INTERNAL MEDICINE

## 2022-06-21 PROCEDURE — 71046 X-RAY EXAM CHEST 2 VIEWS: CPT

## 2022-06-21 RX ORDER — ERYTHROMYCIN 5 MG/G
OINTMENT OPHTHALMIC
COMMUNITY
Start: 2022-06-14

## 2022-06-21 NOTE — PROGRESS NOTES
Assessment/Plan:    1  COVID-19 infection  She is doing well  No residual symptoms  No oxygen required  Continue with vitamin-D supplementation    2  Chronic diastolic congestive heart failure  Continue with the furosemide 40 mg daily along with KCl 10 mEq, 4 daily  She is due for labs tomorrow    3  Hypothyroidism  Continue with present dose of levothyroxine  4  Essential hypertension  Blood pressure is stable on present regimen  5  Stasis dermatitis both lower extremity  Being managed by wound care center     Diagnoses and all orders for this visit:    Gastroesophageal reflux disease without esophagitis  -     CBC; Future    Hypothyroidism, unspecified type    Essential hypertension    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    Diastolic CHF with preserved left ventricular function, NYHA class 2 ([facility])  -     Basic metabolic panel; Future    COVID-19 virus infection    Other orders  -     erythromycin (ILOTYCIN) ophthalmic ointment; APPLY TO BOTH EYES AT BEDTIME          Subjective:          Patient ID: Charanjit Mishra is a 80 y o  female  TCM Call (since 5/21/2022)     Date and time call was made  6/16/2022  8:37 AM    Hospital care reviewed  Records reviewed    Patient was hospitialized at  [facility]    Date of Admission  06/13/22    Date of discharge  06/15/22    Diagnosis  FARIAS, pleural effusion, hypoxia, acute decompensated heart failure, acute on chronic CHF    Disposition  Home    Current Symptoms  Weakness      TCM Call (since 5/21/2022)     Post hospital issues  None    Scheduled for follow up?   Yes    Did you obtain your prescribed medications  Yes    Do you need help managing your prescriptions or medications  Yes    Why type of assitance do you need  daughter    Is transportation to your appointment needed  No    I have advised the patient to call PCP with any new or worsening symptoms  Ketan Saul CMA          About a week ago she was admitted to [facility] with COVID-19 infection  She will discharge in a stable condition  Now patient is feeling better  No oxygen supplementation required  No fever chills  She does have a repeat chest x-ray done today results are still pending  The following portions of the patient's history were reviewed and updated as appropriate: allergies, current medications, past family history, past medical history, past social history, past surgical history and problem list     Review of Systems   Constitutional: Positive for fatigue  Negative for fever  HENT: Negative for congestion, ear discharge, ear pain, postnasal drip, sinus pressure, sore throat, tinnitus and trouble swallowing  Eyes: Negative for discharge, itching and visual disturbance  Respiratory: Negative for cough and shortness of breath  Cardiovascular: Positive for leg swelling  Negative for chest pain and palpitations  Gastrointestinal: Negative for abdominal pain, diarrhea, nausea and vomiting  Endocrine: Negative for cold intolerance and polyuria  Genitourinary: Negative for difficulty urinating, dysuria and urgency  Musculoskeletal: Positive for gait problem  Negative for arthralgias and neck pain  Skin: Negative for rash  Allergic/Immunologic: Negative for environmental allergies  Neurological: Negative for dizziness, weakness and headaches  Psychiatric/Behavioral: Negative for agitation and behavioral problems  The patient is not nervous/anxious            Past Medical History:   Diagnosis Date    Allergic     Anemia     Arthritis     [facility]     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     Depression 7/15/2020    Disease of thyroid gland     Fatigue     last assessed 8/5/15; resolved 9/30/16    Gastric [facility] ([facility] ) 2013    GERD (gastroesophageal reflux disease)     Hyperlipidemia     Hypertension     Osteoporosis     Senile cataract     last assessed 10/18/13; resolved 2/6/15    Situational depression     last assessed 2/11/15; resolved 7/20/15    Visual impairment          Current Outpatient Medications:     acetaminophen (TYLENOL) 500 mg tablet, Take 1 tablet by mouth if needed, Disp: , Rfl:     amLODIPine (NORVASC) 5 mg tablet, Take 1 tablet (5 mg total) by mouth 2 (two) times a day, Disp: 180 tablet, Rfl: 1    BABY ASPIRIN PO, Take 81 mg by mouth in the morning, Disp: , Rfl:     bisoprolol (ZEBETA) 5 mg tablet, Take 1 tablet (5 mg total) by mouth daily Hold for heart rate < 50/min , Disp: 90 tablet, Rfl: 1    calcium carbonate (TUMS EX) 750 mg chewable tablet, Chew 750 mg Three times daily as needed, Disp: , Rfl:     carboxymethylcellulose 0 5 % SOLN, Apply 1 drop to eye every other day , Disp: , Rfl:     Cholecalciferol (VITAMIN D3) 1000 units CAPS, Take 1 tablet by mouth daily, Disp: , Rfl:     ciprofloxacin (CILOXAN) 0 3 % ophthalmic solution, Administer 1 drop into the left eye every other day  , Disp: , Rfl:     ferrous sulfate (FeroSul) 325 (65 Fe) mg tablet, Take 1 tablet (325 mg total) by mouth 2 (two) times a day with meals (Patient taking differently: Take 325 mg by mouth daily with breakfast), Disp: , Rfl:     furosemide (LASIX) 20 mg tablet, Take 20 mg by mouth daily Total 60 mg daily as per cardiology, Disp: , Rfl:     furosemide (LASIX) 40 mg tablet, Take 1 5 tablets (60 mg total) by mouth daily, Disp: 45 tablet, Rfl: 6    levothyroxine 25 mcg tablet, Take 1 tablet (25 mcg total) by mouth daily, Disp: 90 tablet, Rfl: 1    mirtazapine (REMERON) 7 5 MG tablet, Take 1 tablet (7 5 mg total) by mouth daily at bedtime, Disp: 90 tablet, Rfl: 1    omeprazole (PriLOSEC) 40 MG capsule, Take 1 capsule (40 mg total) by mouth daily, Disp: 90 capsule, Rfl: 1    polyethylene glycol-propylene glycol (SYSTANE) 0 4-0 3 %, Administer 2 drops to both eyes as needed  , Disp: , Rfl:     potassium chloride (MICRO-K) 10 MEQ CR capsule, Take 2 capsules (20 mEq total) by mouth 2 (two) times a day, Disp: 360 capsule, Rfl: 0    prednisoLONE acetate (PRED FORTE) 1 % ophthalmic suspension, Administer 1 drop into the left eye every other day  , Disp: , Rfl:     pyridoxine (VITAMIN B6) 100 mg tablet, Take 100 mg by mouth daily  , Disp: , Rfl:     Aspirin 325 MG CAPS, Take 325 mg by mouth daily (Patient not taking: Reported on 6/21/2022), Disp: , Rfl:     erythromycin (ILOTYCIN) ophthalmic ointment, APPLY TO BOTH EYES AT BEDTIME, Disp: , Rfl:     Multiple Vitamins-Minerals (OCUVITE PRESERVISION PO), Take 1 tablet by mouth daily   (Patient not taking: No sig reported), Disp: , Rfl:     nirmatrelvir & ritonavir (Paxlovid) tablet therapy pack, Take 2 nirmatrelvir tablet + 1 ritonavir tablet together per dose, twice daily for 5 days (Patient not taking: No sig reported), Disp: 30 tablet, Rfl: 0    Allergies   Allergen Reactions    Latex Rash    Oxycodone Nausea Only, Hallucinations and Other (See Comments)     Hallucinations  Hallucinations  Other reaction(s): Hallucinations  Hallucinations  Hallucinations  Other reaction(s): Hallucinations  Hallucinations  Other reaction(s): Hallucinations, Other (See Comments)  Hallucinations  Hallucinations  Other reaction(s): Hallucinations  Hallucinations    Oxycodone-Acetaminophen Nausea Only and Hallucinations    Pollen Extract Allergic Rhinitis       Social History   Past Surgical History:   Procedure Laterality Date    COLONOSCOPY      DENTAL SURGERY      EYE SURGERY      GASTRIC RESTRICTION SURGERY      for cancer    JOINT REPLACEMENT      KNEE SURGERY Right 01/2017    TKR    LYMPH NODE BIOPSY      STOMACH SURGERY       Family History   Problem Relation Age of Onset    Heart attack Mother     Diabetes Mother     Heart disease Mother     Prostate cancer Father        Objective:  /70 (BP Location: Left arm, Patient Position: Sitting, Cuff Size: Adult)   Pulse 60   Temp 97 8 °F (36 6 °C) (Temporal)   Ht 5' 1" (1 549 m)   Wt 52 6 kg (116 lb) SpO2 97%   BMI 21 92 kg/m²   Body mass index is 21 92 kg/m²  Physical Exam  Constitutional:       Appearance: She is well-developed  HENT:      Head: Normocephalic  Right Ear: External ear normal       Left Ear: External ear normal       Nose: No rhinorrhea  Mouth/Throat:      Pharynx: No posterior oropharyngeal erythema  Eyes:      General: No scleral icterus  Pupils: Pupils are equal, round, and reactive to light  Neck:      Thyroid: No thyromegaly  Trachea: No tracheal deviation  Cardiovascular:      Rate and Rhythm: Normal rate and regular rhythm  Heart sounds: Murmur heard  Pulmonary:      Effort: Pulmonary effort is normal  No respiratory distress  Breath sounds: Normal breath sounds  No rales  Chest:      Chest wall: No tenderness  Abdominal:      General: Bowel sounds are normal       Palpations: Abdomen is soft  There is no mass  Tenderness: There is no abdominal tenderness  Musculoskeletal:         General: Normal range of motion  Cervical back: Normal range of motion and neck supple  Right lower leg: Edema present  Left lower leg: Edema present  Lymphadenopathy:      Cervical: No cervical adenopathy  Skin:     General: Skin is warm  Neurological:      Mental Status: She is alert and oriented to person, place, and time  Cranial Nerves: No cranial nerve deficit     Psychiatric:         Mood and Affect: Mood normal          Behavior: Behavior normal

## 2022-06-22 ENCOUNTER — APPOINTMENT (OUTPATIENT)
Dept: LAB | Facility: IMAGING CENTER | Age: 87
End: 2022-06-22
Payer: COMMERCIAL

## 2022-06-22 DIAGNOSIS — I50.32 CHRONIC DIASTOLIC (CONGESTIVE) HEART FAILURE (HCC): ICD-10-CM

## 2022-06-22 DIAGNOSIS — N18.31 STAGE 3A CHRONIC KIDNEY DISEASE (HCC): ICD-10-CM

## 2022-06-22 LAB
ANION GAP SERPL CALCULATED.3IONS-SCNC: 2 MMOL/L (ref 4–13)
BUN SERPL-MCNC: 28 MG/DL (ref 5–25)
CALCIUM SERPL-MCNC: 9.9 MG/DL (ref 8.3–10.1)
CHLORIDE SERPL-SCNC: 97 MMOL/L (ref 100–108)
CO2 SERPL-SCNC: 31 MMOL/L (ref 21–32)
CREAT SERPL-MCNC: 1.2 MG/DL (ref 0.6–1.3)
ERYTHROCYTE [DISTWIDTH] IN BLOOD BY AUTOMATED COUNT: 13.8 % (ref 11.6–15.1)
GFR SERPL CREATININE-BSD FRML MDRD: 39 ML/MIN/1.73SQ M
GLUCOSE P FAST SERPL-MCNC: 92 MG/DL (ref 65–99)
HCT VFR BLD AUTO: 34.2 % (ref 34.8–46.1)
HGB BLD-MCNC: 10.5 G/DL (ref 11.5–15.4)
MCH RBC QN AUTO: 28.7 PG (ref 26.8–34.3)
MCHC RBC AUTO-ENTMCNC: 30.7 G/DL (ref 31.4–37.4)
MCV RBC AUTO: 93 FL (ref 82–98)
PLATELET # BLD AUTO: 270 THOUSANDS/UL (ref 149–390)
PMV BLD AUTO: 9.6 FL (ref 8.9–12.7)
POTASSIUM SERPL-SCNC: 4.9 MMOL/L (ref 3.5–5.3)
RBC # BLD AUTO: 3.66 MILLION/UL (ref 3.81–5.12)
SODIUM SERPL-SCNC: 130 MMOL/L (ref 136–145)
WBC # BLD AUTO: 5.42 THOUSAND/UL (ref 4.31–10.16)

## 2022-06-22 PROCEDURE — 36415 COLL VENOUS BLD VENIPUNCTURE: CPT

## 2022-06-22 PROCEDURE — 85027 COMPLETE CBC AUTOMATED: CPT

## 2022-06-22 PROCEDURE — 80048 BASIC METABOLIC PNL TOTAL CA: CPT

## 2022-07-23 ENCOUNTER — APPOINTMENT (OUTPATIENT)
Dept: LAB | Facility: IMAGING CENTER | Age: 87
End: 2022-07-23
Payer: COMMERCIAL

## 2022-07-23 DIAGNOSIS — K21.9 GASTROESOPHAGEAL REFLUX DISEASE WITHOUT ESOPHAGITIS: ICD-10-CM

## 2022-07-23 DIAGNOSIS — I50.30 DIASTOLIC CHF WITH PRESERVED LEFT VENTRICULAR FUNCTION, NYHA CLASS 2 (HCC): ICD-10-CM

## 2022-07-23 PROCEDURE — 36415 COLL VENOUS BLD VENIPUNCTURE: CPT

## 2022-07-23 PROCEDURE — 85027 COMPLETE CBC AUTOMATED: CPT

## 2022-07-24 LAB
ERYTHROCYTE [DISTWIDTH] IN BLOOD BY AUTOMATED COUNT: 14 % (ref 11.6–15.1)
HCT VFR BLD AUTO: 37.8 % (ref 34.8–46.1)
HGB BLD-MCNC: 11.2 G/DL (ref 11.5–15.4)
MCH RBC QN AUTO: 27.9 PG (ref 26.8–34.3)
MCHC RBC AUTO-ENTMCNC: 29.6 G/DL (ref 31.4–37.4)
MCV RBC AUTO: 94 FL (ref 82–98)
PLATELET # BLD AUTO: 254 THOUSANDS/UL (ref 149–390)
PMV BLD AUTO: 10 FL (ref 8.9–12.7)
RBC # BLD AUTO: 4.01 MILLION/UL (ref 3.81–5.12)
WBC # BLD AUTO: 5.23 THOUSAND/UL (ref 4.31–10.16)

## 2022-07-25 DIAGNOSIS — I10 ESSENTIAL HYPERTENSION: ICD-10-CM

## 2022-07-25 RX ORDER — AMLODIPINE BESYLATE 5 MG/1
5 TABLET ORAL 2 TIMES DAILY
Qty: 180 TABLET | Refills: 1 | Status: SHIPPED | OUTPATIENT
Start: 2022-07-25

## 2022-07-26 ENCOUNTER — APPOINTMENT (OUTPATIENT)
Dept: LAB | Facility: IMAGING CENTER | Age: 87
End: 2022-07-26
Payer: COMMERCIAL

## 2022-07-26 DIAGNOSIS — K21.9 GASTROESOPHAGEAL REFLUX DISEASE WITHOUT ESOPHAGITIS: ICD-10-CM

## 2022-07-26 DIAGNOSIS — I50.30 DIASTOLIC CHF WITH PRESERVED LEFT VENTRICULAR FUNCTION, NYHA CLASS 2 (HCC): ICD-10-CM

## 2022-07-26 LAB
ANION GAP SERPL CALCULATED.3IONS-SCNC: 3 MMOL/L (ref 4–13)
BUN SERPL-MCNC: 27 MG/DL (ref 5–25)
CALCIUM SERPL-MCNC: 9.7 MG/DL (ref 8.3–10.1)
CHLORIDE SERPL-SCNC: 97 MMOL/L (ref 96–108)
CO2 SERPL-SCNC: 32 MMOL/L (ref 21–32)
CREAT SERPL-MCNC: 1.25 MG/DL (ref 0.6–1.3)
ERYTHROCYTE [DISTWIDTH] IN BLOOD BY AUTOMATED COUNT: 13.6 % (ref 11.6–15.1)
GFR SERPL CREATININE-BSD FRML MDRD: 37 ML/MIN/1.73SQ M
GLUCOSE P FAST SERPL-MCNC: 89 MG/DL (ref 65–99)
HCT VFR BLD AUTO: 36.2 % (ref 34.8–46.1)
HGB BLD-MCNC: 11.4 G/DL (ref 11.5–15.4)
MCH RBC QN AUTO: 28.9 PG (ref 26.8–34.3)
MCHC RBC AUTO-ENTMCNC: 31.5 G/DL (ref 31.4–37.4)
MCV RBC AUTO: 92 FL (ref 82–98)
PLATELET # BLD AUTO: 236 THOUSANDS/UL (ref 149–390)
PMV BLD AUTO: 9.4 FL (ref 8.9–12.7)
POTASSIUM SERPL-SCNC: 4.1 MMOL/L (ref 3.5–5.3)
RBC # BLD AUTO: 3.95 MILLION/UL (ref 3.81–5.12)
SODIUM SERPL-SCNC: 132 MMOL/L (ref 135–147)
WBC # BLD AUTO: 5.8 THOUSAND/UL (ref 4.31–10.16)

## 2022-07-26 PROCEDURE — 36415 COLL VENOUS BLD VENIPUNCTURE: CPT

## 2022-07-26 PROCEDURE — 85027 COMPLETE CBC AUTOMATED: CPT

## 2022-07-26 PROCEDURE — 80048 BASIC METABOLIC PNL TOTAL CA: CPT

## 2022-08-05 ENCOUNTER — OFFICE VISIT (OUTPATIENT)
Dept: INTERNAL MEDICINE CLINIC | Age: 87
End: 2022-08-05
Payer: COMMERCIAL

## 2022-08-05 VITALS
DIASTOLIC BLOOD PRESSURE: 72 MMHG | BODY MASS INDEX: 21.83 KG/M2 | HEART RATE: 57 BPM | WEIGHT: 115.6 LBS | OXYGEN SATURATION: 97 % | TEMPERATURE: 98 F | HEIGHT: 61 IN | SYSTOLIC BLOOD PRESSURE: 136 MMHG

## 2022-08-05 DIAGNOSIS — N18.31 STAGE 3A CHRONIC KIDNEY DISEASE (HCC): ICD-10-CM

## 2022-08-05 DIAGNOSIS — I50.32 CHRONIC DIASTOLIC (CONGESTIVE) HEART FAILURE (HCC): ICD-10-CM

## 2022-08-05 DIAGNOSIS — R26.2 AMBULATORY DYSFUNCTION: ICD-10-CM

## 2022-08-05 DIAGNOSIS — K21.9 GASTROESOPHAGEAL REFLUX DISEASE WITHOUT ESOPHAGITIS: ICD-10-CM

## 2022-08-05 DIAGNOSIS — E03.9 HYPOTHYROIDISM, UNSPECIFIED TYPE: ICD-10-CM

## 2022-08-05 DIAGNOSIS — I50.9 CHRONIC CONGESTIVE HEART FAILURE, UNSPECIFIED HEART FAILURE TYPE (HCC): ICD-10-CM

## 2022-08-05 DIAGNOSIS — I50.30 DIASTOLIC CHF WITH PRESERVED LEFT VENTRICULAR FUNCTION, NYHA CLASS 2 (HCC): Primary | ICD-10-CM

## 2022-08-05 DIAGNOSIS — E87.1 HYPONATREMIA: ICD-10-CM

## 2022-08-05 DIAGNOSIS — D50.9 IRON DEFICIENCY ANEMIA, UNSPECIFIED IRON DEFICIENCY ANEMIA TYPE: ICD-10-CM

## 2022-08-05 DIAGNOSIS — Z00.00 MEDICARE ANNUAL WELLNESS VISIT, SUBSEQUENT: ICD-10-CM

## 2022-08-05 DIAGNOSIS — I11.0 HYPERTENSIVE HEART DISEASE WITH HEART FAILURE (HCC): ICD-10-CM

## 2022-08-05 DIAGNOSIS — I10 ESSENTIAL HYPERTENSION: ICD-10-CM

## 2022-08-05 DIAGNOSIS — C16.3: ICD-10-CM

## 2022-08-05 DIAGNOSIS — E78.2 MIXED HYPERLIPIDEMIA: ICD-10-CM

## 2022-08-05 DIAGNOSIS — I73.89 OTHER SPECIFIED PERIPHERAL VASCULAR DISEASES (HCC): ICD-10-CM

## 2022-08-05 PROBLEM — J96.01 ACUTE RESPIRATORY FAILURE WITH HYPOXIA (HCC): Status: RESOLVED | Noted: 2022-01-31 | Resolved: 2022-08-05

## 2022-08-05 PROBLEM — T84.50XA INFECTION OF PROSTHETIC JOINT (HCC): Status: RESOLVED | Noted: 2019-08-23 | Resolved: 2022-08-05

## 2022-08-05 PROCEDURE — 1125F AMNT PAIN NOTED PAIN PRSNT: CPT | Performed by: INTERNAL MEDICINE

## 2022-08-05 PROCEDURE — 3725F SCREEN DEPRESSION PERFORMED: CPT | Performed by: INTERNAL MEDICINE

## 2022-08-05 PROCEDURE — 1160F RVW MEDS BY RX/DR IN RCRD: CPT | Performed by: INTERNAL MEDICINE

## 2022-08-05 PROCEDURE — 1170F FXNL STATUS ASSESSED: CPT | Performed by: INTERNAL MEDICINE

## 2022-08-05 PROCEDURE — G0439 PPPS, SUBSEQ VISIT: HCPCS | Performed by: INTERNAL MEDICINE

## 2022-08-05 PROCEDURE — 3288F FALL RISK ASSESSMENT DOCD: CPT | Performed by: INTERNAL MEDICINE

## 2022-08-05 PROCEDURE — 99214 OFFICE O/P EST MOD 30 MIN: CPT | Performed by: INTERNAL MEDICINE

## 2022-08-05 NOTE — ASSESSMENT & PLAN NOTE
Wt Readings from Last 3 Encounters:   08/05/22 52 4 kg (115 lb 9 6 oz)   06/21/22 52 6 kg (116 lb)   06/07/22 53 5 kg (118 lb)       Will continue with present dose of diuretics    Continue with low-salt diet

## 2022-08-05 NOTE — PATIENT INSTRUCTIONS
Medicare Preventive Visit Patient Instructions  Thank you for completing your Welcome to Medicare Visit or Medicare Annual Wellness Visit today  Your next wellness visit will be due in one year (8/6/2023)  The screening/preventive services that you may require over the next 5-10 years are detailed below  Some tests may not apply to you based off risk factors and/or age  Screening tests ordered at today's visit but not completed yet may show as past due  Also, please note that scanned in results may not display below  Preventive Screenings:  Service Recommendations Previous Testing/Comments   Colorectal Cancer Screening  * Colonoscopy    * Fecal Occult Blood Test (FOBT)/Fecal Immunochemical Test (FIT)  * Fecal DNA/Cologuard Test  * Flexible Sigmoidoscopy Age: 54-65 years old   Colonoscopy: every 10 years (may be performed more frequently if at higher risk)  OR  FOBT/FIT: every 1 year  OR  Cologuard: every 3 years  OR  Sigmoidoscopy: every 5 years  Screening may be recommended earlier than age 48 if at higher risk for colorectal cancer  Also, an individualized decision between you and your healthcare provider will decide whether screening between the ages of 74-80 would be appropriate  Colonoscopy: 04/25/2016  FOBT/FIT: Not on file  Cologuard: Not on file  Sigmoidoscopy: Not on file    Screening Not Indicated     Breast Cancer Screening Age: 36 years old  Frequency: every 1-2 years  Not required if history of left and right mastectomy Mammogram: 04/28/2000        Cervical Cancer Screening Between the ages of 21-29, pap smear recommended once every 3 years  Between the ages of 33-67, can perform pap smear with HPV co-testing every 5 years     Recommendations may differ for women with a history of total hysterectomy, cervical cancer, or abnormal pap smears in past  Pap Smear: Not on file    Screening Not Indicated   Hepatitis C Screening Once for adults born between 1945 and 1965  More frequently in patients at high risk for Hepatitis C Hep C Antibody: Not on file        Diabetes Screening 1-2 times per year if you're at risk for diabetes or have pre-diabetes Fasting glucose: 89 mg/dL   A1C: No results in last 5 years    Screening Current   Cholesterol Screening Once every 5 years if you don't have a lipid disorder  May order more often based on risk factors  Lipid panel: 11/02/2018    Screening Not Indicated  History Lipid Disorder     Other Preventive Screenings Covered by Medicare:  1  Abdominal Aortic Aneurysm (AAA) Screening: covered once if your at risk  You're considered to be at risk if you have a family history of AAA  2  Lung Cancer Screening: covers low dose CT scan once per year if you meet all of the following conditions: (1) Age 50-69; (2) No signs or symptoms of lung cancer; (3) Current smoker or have quit smoking within the last 15 years; (4) You have a tobacco smoking history of at least 30 pack years (packs per day multiplied by number of years you smoked); (5) You get a written order from a healthcare provider  3  Glaucoma Screening: covered annually if you're considered high risk: (1) You have diabetes OR (2) Family history of glaucoma OR (3)  aged 48 and older OR (3)  American aged 72 and older  3  Osteoporosis Screening: covered every 2 years if you meet one of the following conditions: (1) You're estrogen deficient and at risk for osteoporosis based off medical history and other findings; (2) Have a vertebral abnormality; (3) On glucocorticoid therapy for more than 3 months; (4) Have primary hyperparathyroidism; (5) On osteoporosis medications and need to assess response to drug therapy  · Last bone density test (DXA Scan): 08/22/2008  5  HIV Screening: covered annually if you're between the age of 12-76  Also covered annually if you are younger than 13 and older than 72 with risk factors for HIV infection   For pregnant patients, it is covered up to 3 times per pregnancy  Immunizations:  Immunization Recommendations   Influenza Vaccine Annual influenza vaccination during flu season is recommended for all persons aged >= 6 months who do not have contraindications   Pneumococcal Vaccine (Prevnar and Pneumovax)  * Prevnar = PCV13  * Pneumovax = PPSV23   Adults 25-60 years old: 1-3 doses may be recommended based on certain risk factors  Adults 72 years old: Prevnar (PCV13) vaccine recommended followed by Pneumovax (PPSV23) vaccine  If already received PPSV23 since turning 65, then PCV13 recommended at least one year after PPSV23 dose  Hepatitis B Vaccine 3 dose series if at intermediate or high risk (ex: diabetes, end stage renal disease, liver disease)   Tetanus (Td) Vaccine - COST NOT COVERED BY MEDICARE PART B Following completion of primary series, a booster dose should be given every 10 years to maintain immunity against tetanus  Td may also be given as tetanus wound prophylaxis  Tdap Vaccine - COST NOT COVERED BY MEDICARE PART B Recommended at least once for all adults  For pregnant patients, recommended with each pregnancy  Shingles Vaccine (Shingrix) - COST NOT COVERED BY MEDICARE PART B  2 shot series recommended in those aged 48 and above     Health Maintenance Due:  There are no preventive care reminders to display for this patient  Immunizations Due:      Topic Date Due    COVID-19 Vaccine (3 - Booster for Moderna series) 08/03/2021    Influenza Vaccine (1) 09/01/2022     Advance Directives   What are advance directives? Advance directives are legal documents that state your wishes and plans for medical care  These plans are made ahead of time in case you lose your ability to make decisions for yourself  Advance directives can apply to any medical decision, such as the treatments you want, and if you want to donate organs  What are the types of advance directives?   There are many types of advance directives, and each state has rules about how to use them  You may choose a combination of any of the following:  · Living will: This is a written record of the treatment you want  You can also choose which treatments you do not want, which to limit, and which to stop at a certain time  This includes surgery, medicine, IV fluid, and tube feedings  · Durable power of  for healthcare [facility]): This is a written record that states who you want to make healthcare choices for you when you are unable to make them for yourself  This person, called a proxy, is usually a family member or a friend  You may choose more than 1 proxy  · Do not resuscitate (DNR) order:  A DNR order is used in case your heart stops beating or you stop breathing  It is a request not to have certain forms of treatment, such as CPR  A DNR order may be included in other types of advance directives  · Medical directive: This covers the care that you want if you are in a coma, near death, or unable to make decisions for yourself  You can list the treatments you want for each condition  Treatment may include pain medicine, surgery, blood transfusions, dialysis, IV or tube feedings, and a ventilator (breathing machine)  · Values history: This document has questions about your views, beliefs, and how you feel and think about life  This information can help others choose the care that you would choose  Why are advance directives important? An advance directive helps you control your care  Although spoken wishes may be used, it is better to have your wishes written down  Spoken wishes can be misunderstood, or not followed  Treatments may be given even if you do not want them  An advance directive may make it easier for your family to make difficult choices about your care  Fall Prevention    Fall prevention  includes ways to make your home and other areas safer  It also includes ways you can move more carefully to prevent a fall   Health conditions that cause changes in your blood pressure, vision, or muscle strength and coordination may increase your risk for falls  Medicines may also increase your risk for falls if they make you dizzy, weak, or sleepy  Fall prevention tips:   · Stand or sit up slowly  · Use assistive devices as directed  · Wear shoes that fit well and have soles that   · Wear a personal alarm  · Stay active  · Manage your medical conditions  Home Safety Tips:  · Add items to prevent falls in the bathroom  · Keep paths clear  · Install bright lights in your home  · Keep items you use often on shelves within reach  · Paint or place reflective tape on the edges of your stairs  © Copyright [employer] 2018 Information is for End User's use only and may not be sold, redistributed or otherwise used for commercial purposes   All illustrations and images included in CareNotes® are the copyrighted property of [employer]  or [address] [place] [address] [facility]

## 2022-08-05 NOTE — PROGRESS NOTES
Assessment and Plan:     Problem List Items Addressed This Visit        Digestive    Gastroesophageal reflux disease     Stable on present regimen  Primary malignant neoplasm of pyloric antrum (HCC)     In remission  Endocrine    Hypothyroidism     Continue with present dose of levothyroxine  Will check thyroid function test before next visit            Cardiovascular and Mediastinum    Essential hypertension     Blood pressure is stable on present regimen         Chronic congestive heart failure (HCC)     Wt Readings from Last 3 Encounters:   08/05/22 52 4 kg (115 lb 9 6 oz)   06/21/22 52 6 kg (116 lb)   06/07/22 53 5 kg (118 lb)       Will continue with present dose of diuretics  Continue with low-salt diet           Diastolic CHF with preserved left ventricular function, NYHA class 2 (HCC) - Primary    Relevant Orders    Comprehensive metabolic panel    TSH, 3rd generation with Free T4 reflex    Other specified peripheral vascular diseases (HCC)    Chronic diastolic (congestive) heart failure (HCC)    Hypertensive heart disease with heart failure (HCC)       Genitourinary    Stage 3a chronic kidney disease (HCC)       Other    Anemia     Hemoglobin is stable  Will continue to monitor         Relevant Orders    CBC    Ambulatory dysfunction    Hyperlipidemia    Hyponatremia    Medicare annual wellness visit, subsequent          Falls Plan of Care: balance, strength, and gait training instructions were provided  Preventive health issues were discussed with patient, and age appropriate screening tests were ordered as noted in patient's After Visit Summary  Personalized health advice and appropriate referrals for health education or preventive services given if needed, as noted in patient's After Visit Summary  History of Present Illness:     Patient presents for a Medicare Wellness Visit    Patient is here for regular follow-up    Also have blood work done would like to discuss results  Patient Care Team:  Tamara Johnson MD as PCP - Elizabeth Leyva MD as PCP - PCP-[facility] Attributed-Roster     Review of Systems:     Review of Systems   Constitutional: Negative for fatigue and fever  HENT: Negative for congestion, ear discharge, ear pain, postnasal drip, sinus pressure, sore throat, tinnitus and trouble swallowing  Eyes: Negative for discharge, itching and visual disturbance  Respiratory: Negative for cough and shortness of breath  Cardiovascular: Positive for leg swelling  Negative for chest pain and palpitations  Gastrointestinal: Negative for abdominal pain, diarrhea, nausea and vomiting  Endocrine: Negative for cold intolerance and polyuria  Genitourinary: Negative for difficulty urinating, dysuria and urgency  Musculoskeletal: Positive for arthralgias  Negative for neck pain  Skin: Negative for rash  Allergic/Immunologic: Negative for environmental allergies  Neurological: Negative for dizziness, weakness and headaches  Psychiatric/Behavioral: Negative for agitation and behavioral problems  The patient is not nervous/anxious           Problem List:     Patient Active Problem List   Diagnosis    Anemia    Essential hypertension    Gastroesophageal reflux disease    Hypothyroidism    Osteoarthritis, hand    Osteoarthritis of right shoulder    Ambulatory dysfunction    Bilateral edema of lower extremity    Bundle-branch block    Hyperglycemia    Hyperlipidemia    Hyponatremia    Labile blood pressure    Lung nodule    Neoplasm of uncertain behavior of lung    Osteoarthrosis of knee    Primary malignant neoplasm of pyloric antrum ([address] )    S/P total knee replacement    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    Venous (peripheral) insufficiency    Wound infection    Acute blood loss anemia    Chronic congestive heart failure (HCC)    Diastolic CHF with preserved left ventricular function, NYHA class 2 (HCC)    Nonrheumatic aortic valve stenosis    Depression, recurrent (HCC)    Hypercalcemia    Other specified peripheral vascular diseases ([facility] )    Chronic diastolic (congestive) heart failure (HCC)    Hypertensive heart disease with heart failure (HCC)    Moderate protein-energy malnutrition (HCC)    Stage 3a chronic kidney disease (HCC)    Edema of right lower extremity    Wound of right ankle    Closed wedge compression fracture of T6 vertebra with routine healing    Closed wedge compression fracture of T4 vertebra with routine healing    COVID-19 virus infection    Cough    Medicare annual wellness visit, subsequent      Past Medical and Surgical History:     Past Medical History:   Diagnosis Date    Allergic     Anemia     Arthritis     Cancer ([place] [address] )     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[address] )     Depression 7/15/2020    Disease of thyroid gland     Fatigue     last assessed 8/5/15; resolved 9/30/16    Gastric cancer ([place] [address] ) 2013    GERD (gastroesophageal reflux disease)     Hyperlipidemia     Hypertension     Osteoporosis     Senile cataract     last assessed 10/18/13; resolved 2/6/15    Situational depression     last assessed 2/11/15; resolved 7/20/15    Visual impairment      Past Surgical History:   Procedure Laterality Date    COLONOSCOPY      DENTAL SURGERY      EYE SURGERY      GASTRIC RESTRICTION SURGERY      for cancer    JOINT REPLACEMENT      KNEE SURGERY Right 01/2017    TKR    LYMPH NODE BIOPSY      STOMACH SURGERY        Family History:     Family History   Problem Relation Age of Onset    Heart attack Mother     Diabetes Mother     Heart disease Mother     Prostate cancer Father       Social History:     Social History     Socioeconomic History    Marital status:       Spouse name: None    Number of children: None    Years of education: None    Highest education level: None   Occupational History    None   Tobacco Use    Smoking status: Never Smoker    Smokeless tobacco: Never Used   Vaping Use    Vaping Use: Never used   Substance and Sexual Activity    Alcohol use: No    Drug use: No    Sexual activity: Not Currently   Other Topics Concern    None   Social History Narrative    DAILY COFFEE CONSUMPTION 1 CUPS      Social Determinants of Health     Financial Resource Strain: Not on file   Food Insecurity: Not on file   Transportation Needs: Not on file   Physical Activity: Not on file   Stress: Not on file   Social Connections: Not on file   Intimate Partner Violence: Not on file   Housing Stability: Not on file      Medications and Allergies:     Current Outpatient Medications   Medication Sig Dispense Refill    acetaminophen (TYLENOL) 500 mg tablet Take 1 tablet by mouth if needed      amLODIPine (NORVASC) 5 mg tablet Take 1 tablet (5 mg total) by mouth 2 (two) times a day 180 tablet 1    BABY ASPIRIN PO Take 81 mg by mouth in the morning      bisoprolol (ZEBETA) 5 mg tablet Take 1 tablet (5 mg total) by mouth daily Hold for heart rate < 50/min   90 tablet 1    calcium carbonate (TUMS EX) 750 mg chewable tablet Chew 750 mg Three times daily as needed      carboxymethylcellulose 0 5 % SOLN Apply 1 drop to eye every other day       Cholecalciferol (VITAMIN D3) 1000 units CAPS Take 1 tablet by mouth daily      ciprofloxacin (CILOXAN) 0 3 % ophthalmic solution Administer 1 drop into the left eye every other day        erythromycin (ILOTYCIN) ophthalmic ointment APPLY TO BOTH EYES AT BEDTIME      ferrous sulfate (FeroSul) 325 (65 Fe) mg tablet Take 1 tablet (325 mg total) by mouth 2 (two) times a day with meals (Patient taking differently: Take 325 mg by mouth daily with breakfast)      furosemide (LASIX) 20 mg tablet Take 20 mg by mouth daily Total 60 mg daily as per cardiology      furosemide (LASIX) 40 mg tablet Take 1 5 tablets (60 mg total) by mouth daily (Patient taking differently: Take 40 mg by mouth daily) 45 tablet 6    levothyroxine 25 mcg tablet Take 1 tablet (25 mcg total) by mouth daily 90 tablet 1    mirtazapine (REMERON) 7 5 MG tablet Take 1 tablet (7 5 mg total) by mouth daily at bedtime 90 tablet 1    omeprazole (PriLOSEC) 40 MG capsule Take 1 capsule (40 mg total) by mouth daily 90 capsule 1    polyethylene glycol-propylene glycol (SYSTANE) 0 4-0 3 % Administer 2 drops to both eyes as needed        potassium chloride (MICRO-K) 10 MEQ CR capsule Take 2 capsules (20 mEq total) by mouth 2 (two) times a day (Patient taking differently: Take 20 mEq by mouth 3 (three) times a day) 360 capsule 0    prednisoLONE acetate (PRED FORTE) 1 % ophthalmic suspension Administer 1 drop into the left eye every other day        pyridoxine (VITAMIN B6) 100 mg tablet Take 100 mg by mouth daily        Aspirin 325 MG CAPS Take 325 mg by mouth daily      Multiple Vitamins-Minerals (OCUVITE PRESERVISION PO) Take 1 tablet by mouth daily   (Patient not taking: Reported on 8/5/2022)      nirmatrelvir & ritonavir (Paxlovid) tablet therapy pack Take 2 nirmatrelvir tablet + 1 ritonavir tablet together per dose, twice daily for 5 days 30 tablet 0     No current facility-administered medications for this visit       Allergies   Allergen Reactions    Latex Rash    Oxycodone Nausea Only, Hallucinations and Other (See Comments)     Hallucinations  Hallucinations  Other reaction(s): Hallucinations  Hallucinations  Hallucinations  Other reaction(s): Hallucinations  Hallucinations  Other reaction(s): Hallucinations, Other (See Comments)  Hallucinations  Hallucinations  Other reaction(s): Hallucinations  Hallucinations    Oxycodone-Acetaminophen Nausea Only and Hallucinations    Pollen Extract Allergic Rhinitis      Immunizations:     Immunization History   Administered Date(s) Administered    COVID-19 MODERNA VACC 0 5 ML IM 02/03/2021, 03/03/2021    INFLUENZA 11/18/2005, 10/20/2006, 11/02/2007, 10/24/2008, 10/15/2010    Influenza Split High Dose Preservative Free IM 11/06/2013, 10/14/2014, 11/18/2015, 12/12/2016, 12/19/2017    Influenza, high dose seasonal 0 7 mL 11/07/2018, 10/07/2019, 12/01/2020, 10/20/2021    Influenza, seasonal, injectable 12/09/2011, 11/16/2012    Pneumococcal Conjugate 13-Valent 07/21/2015, 12/15/2016    Pneumococcal Polysaccharide PPV23 04/22/2005    Td (adult), adsorbed 04/16/2008      Health Maintenance: There are no preventive care reminders to display for this patient  Topic Date Due    COVID-19 Vaccine (3 - Booster for Moderna series) 08/03/2021    Influenza Vaccine (1) 09/01/2022      Medicare Screening Tests and Risk Assessments: Ming Frost is here for her Subsequent Wellness visit  Last Medicare Wellness visit information reviewed, patient interviewed and updates made to the record today  Historian  Patient cannot answer questions due to cognitive impairment, intelluctual disability, or expressive limitations  Information provided by: family  Health Risk Assessment:   Patient rates overall health as good  Patient feels that their physical health rating is slightly better  Patient is satisfied with their life  Eyesight was rated as slightly worse  Hearing was rated as slightly worse  Patient feels that their emotional and mental health rating is same  Patients states they are never, rarely angry  Patient states they are sometimes unusually tired/fatigued  Pain experienced in the last 7 days has been some  Patient's pain rating has been 4/10  Patient states that she has experienced no weight loss or gain in last 6 months  Depression Screening:   PHQ-9 Score: 3      Fall Risk Screening:    In the past year, patient has experienced: history of falling in past year    Number of falls: 2 or more  Injured during fall?: Yes    Feels unsteady when standing or walking?: Yes    Worried about falling?: Yes      Urinary Incontinence Screening:   Patient has not leaked urine accidently in the last six months  Home Safety:  Patient has trouble with stairs inside or outside of their home  Patient has working smoke alarms and has working carbon monoxide detector  Home safety hazards include: none  Nutrition:   Current diet is No Added Salt  Medications:   Patient is currently taking over-the-counter supplements  OTC medications include: see medication list  Patient is able to manage medications  Activities of Daily Living (ADLs)/Instrumental Activities of Daily Living (IADLs):   Walk and transfer into and out of bed and chair?: Yes  Dress and groom yourself?: Yes    Bathe or shower yourself?: Yes    Feed yourself?  Yes  Do your laundry/housekeeping?: No  Manage your money, pay your bills and track your expenses?: No  Make your own meals?: No    Do your own shopping?: No    Previous Hospitalizations:   Any hospitalizations or ED visits within the last 12 months?: Yes    How many hospitalizations have you had in the last year?: 1-2    Advance Care Planning:   Living will: Yes    Advanced directive: Yes      Cognitive Screening:   Provider or family/friend/caregiver concerned regarding cognition?: No    PREVENTIVE SCREENINGS      Cardiovascular Screening:    General: Screening Not Indicated and History Lipid Disorder      Diabetes Screening:     General: Screening Current      Colorectal Cancer Screening:     General: Screening Not Indicated      Breast Cancer Screening:     General: Patient Declines      Cervical Cancer Screening:    General: Screening Not Indicated      Osteoporosis Screening:    General: Patient Declines      Abdominal Aortic Aneurysm (AAA) Screening:        General: Screening Not Indicated      Lung Cancer Screening:     General: Screening Not Indicated and History Lung Cancer      Hepatitis C Screening:    General: Screening Not Indicated    Screening, Brief Intervention, and Referral to Treatment (SBIRT)    Screening  Typical number of drinks in a day: 0  Typical number of drinks in a week: 0  Interpretation: Low risk drinking behavior  Single Item Drug Screening:  How often have you used an illegal drug (including marijuana) or a prescription medication for non-medical reasons in the past year? never    Single Item Drug Screen Score: 0  Interpretation: Negative screen for possible drug use disorder    Brief Intervention  Alcohol & drug use screenings were reviewed  No concerns regarding substance use disorder identified  Other Counseling Topics:   Car/seat belt/driving safety, skin self-exam, sunscreen and calcium and vitamin D intake and regular weightbearing exercise  No exam data present     Physical Exam:     /72 (BP Location: Left arm, Patient Position: Sitting, Cuff Size: Standard)   Pulse 57   Temp 98 °F (36 7 °C) (Temporal)   Ht 5' 1" (1 549 m)   Wt 52 4 kg (115 lb 9 6 oz)   SpO2 97% Comment: room air  BMI 21 84 kg/m²     Physical Exam  Vitals and nursing note reviewed  Constitutional:       General: She is not in acute distress  Appearance: She is well-developed  HENT:      Head: Normocephalic and atraumatic  Right Ear: External ear normal       Left Ear: External ear normal       Nose: No rhinorrhea  Mouth/Throat:      Pharynx: No posterior oropharyngeal erythema  Eyes:      Conjunctiva/sclera: Conjunctivae normal    Cardiovascular:      Rate and Rhythm: Normal rate and regular rhythm  Heart sounds: Murmur heard  Comments: 1+ bilateral lower extremity edema present  Pulmonary:      Effort: Pulmonary effort is normal  No respiratory distress  Breath sounds: Normal breath sounds  Abdominal:      Palpations: Abdomen is soft  Tenderness: There is no abdominal tenderness  Musculoskeletal:         General: Normal range of motion  Cervical back: Neck supple  Right lower leg: Edema present  Left lower leg: Edema present        Comments: Right lower extremity with dressing   Skin:     General: Skin is warm and dry  Neurological:      Mental Status: She is alert and oriented to person, place, and time  Cranial Nerves: No cranial nerve deficit     Psychiatric:         Mood and Affect: Mood normal          Behavior: Behavior normal           Katrin Grewal MD

## 2022-09-29 ENCOUNTER — TELEPHONE (OUTPATIENT)
Dept: LAB | Facility: HOSPITAL | Age: 87
End: 2022-09-29

## 2022-10-06 ENCOUNTER — APPOINTMENT (OUTPATIENT)
Dept: LAB | Facility: HOSPITAL | Age: 87
End: 2022-10-06
Attending: INTERNAL MEDICINE
Payer: COMMERCIAL

## 2022-10-06 DIAGNOSIS — D50.9 IRON DEFICIENCY ANEMIA, UNSPECIFIED IRON DEFICIENCY ANEMIA TYPE: ICD-10-CM

## 2022-10-06 DIAGNOSIS — E03.9 HYPOTHYROIDISM, UNSPECIFIED TYPE: ICD-10-CM

## 2022-10-06 DIAGNOSIS — I50.30 DIASTOLIC CHF WITH PRESERVED LEFT VENTRICULAR FUNCTION, NYHA CLASS 2 (HCC): ICD-10-CM

## 2022-10-06 LAB
ALBUMIN SERPL BCP-MCNC: 3 G/DL (ref 3.5–5)
ALP SERPL-CCNC: 81 U/L (ref 46–116)
ALT SERPL W P-5'-P-CCNC: 15 U/L (ref 12–78)
ANION GAP SERPL CALCULATED.3IONS-SCNC: 7 MMOL/L (ref 4–13)
AST SERPL W P-5'-P-CCNC: 21 U/L (ref 5–45)
BILIRUB SERPL-MCNC: 0.52 MG/DL (ref 0.2–1)
BUN SERPL-MCNC: 17 MG/DL (ref 5–25)
CALCIUM ALBUM COR SERPL-MCNC: 10.3 MG/DL (ref 8.3–10.1)
CALCIUM SERPL-MCNC: 9.5 MG/DL (ref 8.3–10.1)
CHLORIDE SERPL-SCNC: 96 MMOL/L (ref 96–108)
CO2 SERPL-SCNC: 28 MMOL/L (ref 21–32)
CREAT SERPL-MCNC: 0.82 MG/DL (ref 0.6–1.3)
ERYTHROCYTE [DISTWIDTH] IN BLOOD BY AUTOMATED COUNT: 14.8 % (ref 11.6–15.1)
GFR SERPL CREATININE-BSD FRML MDRD: 62 ML/MIN/1.73SQ M
GLUCOSE P FAST SERPL-MCNC: 81 MG/DL (ref 65–99)
HCT VFR BLD AUTO: 36.4 % (ref 34.8–46.1)
HGB BLD-MCNC: 11.4 G/DL (ref 11.5–15.4)
MCH RBC QN AUTO: 28 PG (ref 26.8–34.3)
MCHC RBC AUTO-ENTMCNC: 31.3 G/DL (ref 31.4–37.4)
MCV RBC AUTO: 89 FL (ref 82–98)
PLATELET # BLD AUTO: 284 THOUSANDS/UL (ref 149–390)
PMV BLD AUTO: 9 FL (ref 8.9–12.7)
POTASSIUM SERPL-SCNC: 4.2 MMOL/L (ref 3.5–5.3)
PROT SERPL-MCNC: 8.2 G/DL (ref 6.4–8.4)
RBC # BLD AUTO: 4.07 MILLION/UL (ref 3.81–5.12)
SODIUM SERPL-SCNC: 131 MMOL/L (ref 135–147)
T4 FREE SERPL-MCNC: 1.09 NG/DL (ref 0.76–1.46)
TSH SERPL DL<=0.05 MIU/L-ACNC: 4.69 UIU/ML (ref 0.45–4.5)
WBC # BLD AUTO: 6.32 THOUSAND/UL (ref 4.31–10.16)

## 2022-10-06 PROCEDURE — 80053 COMPREHEN METABOLIC PANEL: CPT

## 2022-10-06 PROCEDURE — 85027 COMPLETE CBC AUTOMATED: CPT

## 2022-10-06 PROCEDURE — 84443 ASSAY THYROID STIM HORMONE: CPT

## 2022-10-06 PROCEDURE — 84439 ASSAY OF FREE THYROXINE: CPT

## 2022-10-06 PROCEDURE — 36415 COLL VENOUS BLD VENIPUNCTURE: CPT

## 2022-10-11 ENCOUNTER — HOME HEALTH ADMISSION (OUTPATIENT)
Dept: HOME HEALTH SERVICES | Facility: HOME HEALTHCARE | Age: 87
End: 2022-10-11

## 2022-10-11 ENCOUNTER — OFFICE VISIT (OUTPATIENT)
Dept: INTERNAL MEDICINE CLINIC | Facility: OTHER | Age: 87
End: 2022-10-11
Payer: COMMERCIAL

## 2022-10-11 ENCOUNTER — HOME CARE VISIT (OUTPATIENT)
Dept: HOME HEALTH SERVICES | Facility: HOME HEALTHCARE | Age: 87
End: 2022-10-11

## 2022-10-11 VITALS
RESPIRATION RATE: 18 BRPM | WEIGHT: 116.8 LBS | HEIGHT: 61 IN | TEMPERATURE: 98.2 F | HEART RATE: 55 BPM | DIASTOLIC BLOOD PRESSURE: 54 MMHG | OXYGEN SATURATION: 99 % | SYSTOLIC BLOOD PRESSURE: 118 MMHG | BODY MASS INDEX: 22.05 KG/M2

## 2022-10-11 DIAGNOSIS — I73.89 OTHER SPECIFIED PERIPHERAL VASCULAR DISEASES (HCC): ICD-10-CM

## 2022-10-11 DIAGNOSIS — I87.333 CHRONIC VENOUS HYPERTENSION WITH ULCER AND INFLAMMATION INVOLVING BOTH SIDES (HCC): Primary | ICD-10-CM

## 2022-10-11 DIAGNOSIS — K21.9 GASTROESOPHAGEAL REFLUX DISEASE WITHOUT ESOPHAGITIS: ICD-10-CM

## 2022-10-11 DIAGNOSIS — D50.9 IRON DEFICIENCY ANEMIA, UNSPECIFIED IRON DEFICIENCY ANEMIA TYPE: ICD-10-CM

## 2022-10-11 DIAGNOSIS — N18.31 STAGE 3A CHRONIC KIDNEY DISEASE (HCC): ICD-10-CM

## 2022-10-11 DIAGNOSIS — I50.9 CHRONIC CONGESTIVE HEART FAILURE, UNSPECIFIED HEART FAILURE TYPE (HCC): ICD-10-CM

## 2022-10-11 DIAGNOSIS — R26.2 AMBULATORY DYSFUNCTION: ICD-10-CM

## 2022-10-11 DIAGNOSIS — I50.32 CHRONIC DIASTOLIC (CONGESTIVE) HEART FAILURE (HCC): ICD-10-CM

## 2022-10-11 DIAGNOSIS — C16.3: ICD-10-CM

## 2022-10-11 DIAGNOSIS — L97.919 CHRONIC VENOUS HYPERTENSION WITH ULCER AND INFLAMMATION INVOLVING BOTH SIDES (HCC): Primary | ICD-10-CM

## 2022-10-11 DIAGNOSIS — Z23 NEEDS FLU SHOT: ICD-10-CM

## 2022-10-11 DIAGNOSIS — L97.929 CHRONIC VENOUS HYPERTENSION WITH ULCER AND INFLAMMATION INVOLVING BOTH SIDES (HCC): Primary | ICD-10-CM

## 2022-10-11 DIAGNOSIS — I10 ESSENTIAL HYPERTENSION: ICD-10-CM

## 2022-10-11 DIAGNOSIS — E03.9 HYPOTHYROIDISM, UNSPECIFIED TYPE: ICD-10-CM

## 2022-10-11 DIAGNOSIS — I50.30 DIASTOLIC CHF WITH PRESERVED LEFT VENTRICULAR FUNCTION, NYHA CLASS 2 (HCC): ICD-10-CM

## 2022-10-11 PROBLEM — Z00.00 MEDICARE ANNUAL WELLNESS VISIT, SUBSEQUENT: Status: RESOLVED | Noted: 2022-08-05 | Resolved: 2022-10-11

## 2022-10-11 PROBLEM — R05.9 COUGH: Status: RESOLVED | Noted: 2022-06-07 | Resolved: 2022-10-11

## 2022-10-11 PROCEDURE — G0008 ADMIN INFLUENZA VIRUS VAC: HCPCS

## 2022-10-11 PROCEDURE — 99214 OFFICE O/P EST MOD 30 MIN: CPT | Performed by: INTERNAL MEDICINE

## 2022-10-11 PROCEDURE — 90662 IIV NO PRSV INCREASED AG IM: CPT

## 2022-10-11 RX ORDER — POTASSIUM CHLORIDE 750 MG/1
20 CAPSULE, EXTENDED RELEASE ORAL 2 TIMES DAILY
Qty: 360 CAPSULE | Refills: 0 | Status: SHIPPED | OUTPATIENT
Start: 2022-10-11 | End: 2023-01-09

## 2022-10-11 RX ORDER — CLOTRIMAZOLE AND BETAMETHASONE DIPROPIONATE 10; .64 MG/G; MG/G
1 CREAM TOPICAL DAILY
COMMUNITY
Start: 2022-10-01

## 2022-10-11 NOTE — ASSESSMENT & PLAN NOTE
Wt Readings from Last 3 Encounters:   10/11/22 53 kg (116 lb 12 8 oz)   08/05/22 52 4 kg (115 lb 9 6 oz)   06/21/22 52 6 kg (116 lb)     Doing well on present dose of diuretics    Also being followed by cardiologist

## 2022-10-11 NOTE — ASSESSMENT & PLAN NOTE
TSH is slightly elevated  Free T4 is normal   Advised to take 2 tablet of 25 mcg of levothyroxine on Sunday and rest of the list 25 mcg daily

## 2022-10-11 NOTE — PROGRESS NOTES
Assessment/Plan:    Gastroesophageal reflux disease  Doing well on present regimen  Primary malignant neoplasm of pyloric antrum (HCC)  In remission  And family is not interested for further follow-up    Hypothyroidism  TSH is slightly elevated  Free T4 is normal   Advised to take 2 tablet of 25 mcg of levothyroxine on Sunday and rest of the list 25 mcg daily  Essential hypertension  Blood pressure is stable on present regimen    Chronic congestive heart failure (HCC)  Wt Readings from Last 3 Encounters:   10/11/22 53 kg (116 lb 12 8 oz)   08/05/22 52 4 kg (115 lb 9 6 oz)   06/21/22 52 6 kg (116 lb)     Doing well on present dose of diuretics  Also being followed by cardiologist         Diastolic CHF with preserved left ventricular function, NYHA class 2 ([facility] )  Wt Readings from Last 3 Encounters:   10/11/22 53 kg (116 lb 12 8 oz)   08/05/22 52 4 kg (115 lb 9 6 oz)   06/21/22 52 6 kg (116 lb)     Stable on present regimen of diuretics  Chronic venous hypertension with ulcer and inflammation involving both sides (HCC)  Chronic venous ulcer of both lower extremities are almost healed  Stage 3a chronic kidney disease [facility])  Lab Results   Component Value Date    EGFR 62 10/06/2022    EGFR 37 07/26/2022    EGFR 39 06/22/2022    CREATININE 0 82 10/06/2022    CREATININE 1 25 07/26/2022    CREATININE 1 20 06/22/2022   Renal function is stable  Will continue to monitor    Anemia  Hemoglobin stable  Will continue to monitor    Ambulatory dysfunction  Will request the in-home physical therapy for ambulatory dysfunction and gait training       Diagnoses and all orders for this visit:    Chronic venous hypertension with ulcer and inflammation involving both sides (HCC)    Essential hypertension  -     potassium chloride (MICRO-K) 10 MEQ CR capsule;  Take 2 capsules (20 mEq total) by mouth 2 (two) times a day    Hypothyroidism, unspecified type  -     TSH, 3rd generation with Free T4 reflex; Future    Gastroesophageal reflux disease without esophagitis    Primary malignant neoplasm of pyloric antrum (HCC)    Diastolic CHF with preserved left ventricular function, NYHA class 2 ([facility])  -     Comprehensive metabolic panel; Future    Chronic congestive heart failure, unspecified heart failure type ([place] [id number] )    Other specified peripheral vascular diseases (HCC)    Chronic diastolic (congestive) heart failure ([facility] )  -     Referral to [address] [place] [address]; Future    Stage 3a chronic kidney disease (HCC)  -     CBC; Future  -     Comprehensive metabolic panel; Future    Ambulatory dysfunction  -     Referral to [address] [place] [address]; Future    Iron deficiency anemia, unspecified iron deficiency anemia type    Other orders  -     clotrimazole-betamethasone (LOTRISONE) 1-0 05 % cream; Apply 1 application topically in the morning To affected area               Subjective:          Patient ID: Marisol Irving is a 80 y o  female  Patient is here for follow-up  Also blood work done last week would like to discuss results  Still complaining of generalized weakness and difficulty with ambulation  Daughter is requesting home physical therapy  Cough  Pertinent negatives include no chest pain, ear pain, fever, headaches, postnasal drip, rash, sore throat or shortness of breath  There is no history of environmental allergies  The following portions of the patient's history were reviewed and updated as appropriate: allergies, current medications, past family history, past medical history, past social history, past surgical history and problem list     Review of Systems   Constitutional: Positive for fatigue  Negative for fever  HENT: Negative for congestion, ear discharge, ear pain, postnasal drip, sinus pressure, sore throat, tinnitus and trouble swallowing  Eyes: Negative for discharge, itching and visual disturbance  Respiratory: Negative for shortness of breath      Cardiovascular: Negative for chest pain and palpitations  Gastrointestinal: Negative for abdominal pain, diarrhea, nausea and vomiting  Endocrine: Negative for cold intolerance and polyuria  Genitourinary: Negative for difficulty urinating, dysuria and urgency  Musculoskeletal: Positive for gait problem  Negative for arthralgias and neck pain  Skin: Negative for rash  Allergic/Immunologic: Negative for environmental allergies  Neurological: Negative for dizziness, weakness and headaches  Psychiatric/Behavioral: Negative for behavioral problems  The patient is not nervous/anxious            Past Medical History:   Diagnosis Date   • Allergic    • Anemia    • Arthritis    • Cancer [facility])    • 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[facility] )    • Depression 7/15/2020   • Disease of thyroid gland    • Fatigue     last assessed 8/5/15; resolved 9/30/16   • Gastric cancer ([facility] ) 2013   • GERD (gastroesophageal reflux disease)    • Hyperlipidemia    • Hypertension    • Osteoporosis    • Senile cataract     last assessed 10/18/13; resolved 2/6/15   • Situational depression     last assessed 2/11/15; resolved 7/20/15   • Visual impairment          Current Outpatient Medications:   •  acetaminophen (TYLENOL) 500 mg tablet, Take 1 tablet by mouth if needed, Disp: , Rfl:   •  amLODIPine (NORVASC) 5 mg tablet, Take 1 tablet (5 mg total) by mouth 2 (two) times a day, Disp: 180 tablet, Rfl: 1  •  BABY ASPIRIN PO, Take 81 mg by mouth in the morning, Disp: , Rfl:   •  bisoprolol (ZEBETA) 5 mg tablet, Take 1 tablet (5 mg total) by mouth daily Hold for heart rate < 50/min , Disp: 90 tablet, Rfl: 1  •  calcium carbonate (TUMS EX) 750 mg chewable tablet, Chew 750 mg Three times daily as needed, Disp: , Rfl:   •  carboxymethylcellulose 0 5 % SOLN, Apply 1 drop to eye every other day , Disp: , Rfl:   •  Cholecalciferol (VITAMIN D3) 1000 units CAPS, Take 1 tablet by mouth daily, Disp: , Rfl:   •  ciprofloxacin (CILOXAN) 0 3 % ophthalmic solution, Administer 1 drop into the left eye every other day  , Disp: , Rfl:   •  clotrimazole-betamethasone (LOTRISONE) 1-0 05 % cream, Apply 1 application topically in the morning To affected area, Disp: , Rfl:   •  erythromycin (ILOTYCIN) ophthalmic ointment, APPLY TO BOTH EYES AT BEDTIME, Disp: , Rfl:   •  ferrous sulfate (FeroSul) 325 (65 Fe) mg tablet, Take 1 tablet (325 mg total) by mouth 2 (two) times a day with meals (Patient taking differently: Take 325 mg by mouth daily with breakfast), Disp: , Rfl:   •  furosemide (LASIX) 20 mg tablet, Take 20 mg by mouth if needed Total 60 mg daily as per cardiology, Disp: , Rfl:   •  furosemide (LASIX) 40 mg tablet, Take 1 5 tablets (60 mg total) by mouth daily (Patient taking differently: Take 40 mg by mouth daily), Disp: 45 tablet, Rfl: 6  •  levothyroxine 25 mcg tablet, Take 1 tablet (25 mcg total) by mouth daily, Disp: 90 tablet, Rfl: 1  •  mirtazapine (REMERON) 7 5 MG tablet, Take 1 tablet (7 5 mg total) by mouth daily at bedtime, Disp: 90 tablet, Rfl: 1  •  Multiple Vitamins-Minerals (OCUVITE PRESERVISION PO), Take 1 tablet by mouth daily, Disp: , Rfl:   •  omeprazole (PriLOSEC) 40 MG capsule, Take 1 capsule (40 mg total) by mouth daily, Disp: 90 capsule, Rfl: 1  •  polyethylene glycol-propylene glycol (SYSTANE) 0 4-0 3 %, Administer 2 drops to both eyes as needed  , Disp: , Rfl:   •  potassium chloride (MICRO-K) 10 MEQ CR capsule, Take 2 capsules (20 mEq total) by mouth 2 (two) times a day, Disp: 360 capsule, Rfl: 0  •  prednisoLONE acetate (PRED FORTE) 1 % ophthalmic suspension, Administer 1 drop into the left eye every other day  , Disp: , Rfl:   •  pyridoxine (VITAMIN B6) 100 mg tablet, Take 100 mg by mouth daily  , Disp: , Rfl:   •  Aspirin 325 MG CAPS, Take 325 mg by mouth daily, Disp: , Rfl:   •  nirmatrelvir & ritonavir (Paxlovid) tablet therapy pack, Take 2 nirmatrelvir tablet + 1 ritonavir tablet together per dose, twice daily for 5 days, Disp: 30 tablet, Rfl: 0    Allergies Allergen Reactions   • Latex Rash   • Oxycodone Nausea Only, Hallucinations and Other (See Comments)     Hallucinations  Hallucinations  Other reaction(s): Hallucinations  Hallucinations  Hallucinations  Other reaction(s): Hallucinations  Hallucinations  Other reaction(s): Hallucinations, Other (See Comments)  Hallucinations  Hallucinations  Other reaction(s): Hallucinations  Hallucinations   • Oxycodone-Acetaminophen Nausea Only and Hallucinations   • Pollen Extract Allergic Rhinitis       Social History   Past Surgical History:   Procedure Laterality Date   • COLONOSCOPY     • DENTAL SURGERY     • EYE SURGERY     • GASTRIC RESTRICTION SURGERY      for cancer   • JOINT REPLACEMENT     • KNEE SURGERY Right 01/2017    TKR   • LYMPH NODE BIOPSY     • STOMACH SURGERY       Family History   Problem Relation Age of Onset   • Heart attack Mother    • Diabetes Mother    • Heart disease Mother    • Prostate cancer Father        Objective:  /54 (BP Location: Left arm, Patient Position: Sitting, Cuff Size: Standard)   Pulse 55   Temp 98 2 °F (36 8 °C) (Temporal)   Resp 18   Ht 5' 1" (1 549 m)   Wt 53 kg (116 lb 12 8 oz)   SpO2 99%   BMI 22 07 kg/m²   Body mass index is 22 07 kg/m²  Physical Exam  Constitutional:       Appearance: She is well-developed  She is not ill-appearing or diaphoretic  HENT:      Head: Normocephalic  Right Ear: External ear normal       Left Ear: External ear normal       Nose: No rhinorrhea  Mouth/Throat:      Pharynx: No posterior oropharyngeal erythema  Eyes:      General: No scleral icterus  Pupils: Pupils are equal, round, and reactive to light  Neck:      Thyroid: No thyromegaly  Trachea: No tracheal deviation  Cardiovascular:      Rate and Rhythm: Normal rate and regular rhythm  Heart sounds: Murmur heard  Comments: Trace bilateral lower extremity edema present  Pulmonary:      Effort: Pulmonary effort is normal  No respiratory distress  Breath sounds: Rales present  Comments: Bilateral rales at both bases noted  Chest:      Chest wall: No tenderness  Abdominal:      General: Bowel sounds are normal       Palpations: Abdomen is soft  There is no mass  Tenderness: There is no abdominal tenderness  Musculoskeletal:         General: Normal range of motion  Cervical back: Normal range of motion and neck supple  Right lower leg: Edema present  Left lower leg: Edema present  Lymphadenopathy:      Cervical: No cervical adenopathy  Skin:     General: Skin is warm  Neurological:      Mental Status: She is alert and oriented to person, place, and time  Cranial Nerves: No cranial nerve deficit  Gait: Gait abnormal       Comments: Patient is on wheelchair   Psychiatric:         Mood and Affect: Mood normal          Behavior: Behavior normal          Thought Content:  Thought content normal

## 2022-10-11 NOTE — ASSESSMENT & PLAN NOTE
Wt Readings from Last 3 Encounters:   10/11/22 53 kg (116 lb 12 8 oz)   08/05/22 52 4 kg (115 lb 9 6 oz)   06/21/22 52 6 kg (116 lb)     Stable on present regimen of diuretics

## 2022-10-11 NOTE — ASSESSMENT & PLAN NOTE
Lab Results   Component Value Date    EGFR 62 10/06/2022    EGFR 37 07/26/2022    EGFR 39 06/22/2022    CREATININE 0 82 10/06/2022    CREATININE 1 25 07/26/2022    CREATININE 1 20 06/22/2022   Renal function is stable    Will continue to monitor

## 2022-10-12 ENCOUNTER — TELEPHONE (OUTPATIENT)
Dept: LAB | Facility: HOSPITAL | Age: 87
End: 2022-10-12

## 2022-10-12 ENCOUNTER — HOME CARE VISIT (OUTPATIENT)
Dept: HOME HEALTH SERVICES | Facility: HOME HEALTHCARE | Age: 87
End: 2022-10-12
Payer: COMMERCIAL

## 2022-10-12 VITALS — HEART RATE: 56 BPM | DIASTOLIC BLOOD PRESSURE: 63 MMHG | OXYGEN SATURATION: 95 % | SYSTOLIC BLOOD PRESSURE: 134 MMHG

## 2022-10-12 PROCEDURE — 400013 VN SOC

## 2022-10-12 PROCEDURE — G0151 HHCP-SERV OF PT,EA 15 MIN: HCPCS

## 2022-10-14 ENCOUNTER — HOME CARE VISIT (OUTPATIENT)
Dept: HOME HEALTH SERVICES | Facility: HOME HEALTHCARE | Age: 87
End: 2022-10-14
Payer: COMMERCIAL

## 2022-10-15 NOTE — CASE COMMUNICATION
[facility] has admitted your patient to [address] [place] [address] service with the following disciplines:  Physical  Therapy     Primary focus of home health care  P T   for  transfer  training  G T   and  strengthening  ex  to  BLEs  with  isntruction  in  HEP  home  safety  and  energy  conservation  Patient stated goals of care  To  get  stronger  and  to  walk  farther  Anticipated visit pattern and next visit date  2  x  per  week  x  4  wee ks  See medication list - meds in home   Significant clinical findings  Decreased  BLe  strength  and  ambulation  Potential barriers to goal achievement  Patient  hearing    Other pertinent information  NA    Thank you for allowing us to participate in the care of your patient        Gamaliel Dulce

## 2022-10-17 ENCOUNTER — HOME CARE VISIT (OUTPATIENT)
Dept: HOME HEALTH SERVICES | Facility: HOME HEALTHCARE | Age: 87
End: 2022-10-17
Payer: COMMERCIAL

## 2022-10-17 VITALS
HEART RATE: 54 BPM | WEIGHT: 117.8 LBS | DIASTOLIC BLOOD PRESSURE: 55 MMHG | OXYGEN SATURATION: 95 % | BODY MASS INDEX: 22.26 KG/M2 | SYSTOLIC BLOOD PRESSURE: 130 MMHG

## 2022-10-17 PROCEDURE — G0151 HHCP-SERV OF PT,EA 15 MIN: HCPCS

## 2022-10-20 PROCEDURE — G0180 MD CERTIFICATION HHA PATIENT: HCPCS | Performed by: INTERNAL MEDICINE

## 2022-10-21 ENCOUNTER — HOME CARE VISIT (OUTPATIENT)
Dept: HOME HEALTH SERVICES | Facility: HOME HEALTHCARE | Age: 87
End: 2022-10-21
Payer: COMMERCIAL

## 2022-10-21 NOTE — CASE COMMUNICATION
Patient  declined  visit  for  today  with re visit  scheduled  for  Wednesday  10/26    Patient  seen  only  1  X  this  week

## 2022-10-26 ENCOUNTER — HOME CARE VISIT (OUTPATIENT)
Dept: HOME HEALTH SERVICES | Facility: HOME HEALTHCARE | Age: 87
End: 2022-10-26
Payer: COMMERCIAL

## 2022-10-28 ENCOUNTER — PROCEDURE VISIT (OUTPATIENT)
Dept: INTERNAL MEDICINE CLINIC | Age: 87
End: 2022-10-28

## 2022-10-28 ENCOUNTER — HOME CARE VISIT (OUTPATIENT)
Dept: HOME HEALTH SERVICES | Facility: HOME HEALTHCARE | Age: 87
End: 2022-10-28
Payer: COMMERCIAL

## 2022-10-28 VITALS
HEIGHT: 61 IN | HEART RATE: 58 BPM | DIASTOLIC BLOOD PRESSURE: 70 MMHG | OXYGEN SATURATION: 95 % | BODY MASS INDEX: 22.54 KG/M2 | WEIGHT: 119.4 LBS | SYSTOLIC BLOOD PRESSURE: 138 MMHG | TEMPERATURE: 97.8 F

## 2022-10-28 DIAGNOSIS — L81.9 ATYPICAL PIGMENTED SKIN LESION: ICD-10-CM

## 2022-10-28 DIAGNOSIS — L30.9 DERMATITIS: Primary | ICD-10-CM

## 2022-10-28 DIAGNOSIS — C44.92 VERRUCOUS SQUAMOUS CELL CARCINOMA: ICD-10-CM

## 2022-10-28 DIAGNOSIS — L82.1 VERRUCOUS KERATOSIS: ICD-10-CM

## 2022-10-28 RX ORDER — METHYLPREDNISOLONE 4 MG/1
TABLET ORAL
Qty: 21 EACH | Refills: 0 | Status: SHIPPED | OUTPATIENT
Start: 2022-10-28

## 2022-10-28 NOTE — PROGRESS NOTES
Shave lesion    Date/Time: 10/28/2022 4:17 PM  Performed by: Nguyen Bennett DO  Authorized by: Nguyen Bennett DO   Universal Protocol:  Consent: Written consent obtained    Consent given by: patient and guardian  Patient understanding: patient states understanding of the procedure being performed  Patient identity confirmed: verbally with patient      Number of Lesions: 2  Lesion 1:     Body area: trunk    Trunk location: back    Initial size (mm): 14    Final defect size (mm): 14    Malignancy: malignancy unknown      Destruction method: shave removal    Lesion 2:     Body area: lower extremity    Lower extremity location: L hip    Initial size (mm): 14    Final defect size (mm): 14    Malignancy: malignancy unknown      Destruction method: shave removal      Comments:  Post care instructions given, both samples sent for pathology

## 2022-10-29 NOTE — CASE COMMUNICATION
Visit  for  today cancelled  secondary  to  patient  having  MD  appointment    Patient  not  seen  this  week  at  her  request   Follow  up  visit  scheduled  for  10/31

## 2022-10-31 ENCOUNTER — HOME CARE VISIT (OUTPATIENT)
Dept: HOME HEALTH SERVICES | Facility: HOME HEALTHCARE | Age: 87
End: 2022-10-31

## 2022-11-01 NOTE — CASE COMMUNICATION
Patient  declining  further  home  P T   visits  at  this  time  and  Home  Health  episode  will  be  discharged

## 2022-11-14 PROBLEM — L82.1 VERRUCOUS KERATOSIS: Status: ACTIVE | Noted: 2022-11-14

## 2022-11-22 DIAGNOSIS — E03.9 HYPOTHYROIDISM, UNSPECIFIED TYPE: ICD-10-CM

## 2022-11-22 RX ORDER — LEVOTHYROXINE SODIUM 0.03 MG/1
25 TABLET ORAL DAILY
Qty: 90 TABLET | Refills: 1 | Status: SHIPPED | OUTPATIENT
Start: 2022-11-22

## 2022-11-28 ENCOUNTER — TELEPHONE (OUTPATIENT)
Dept: LAB | Facility: HOSPITAL | Age: 87
End: 2022-11-28

## 2022-11-28 NOTE — TELEPHONE ENCOUNTER
11/28 - pt wanted labs done before her appt on 12/8 - unable to accommodate - pt will try to get to the lab - she is keeping appt for next week just in case - will call to cancel if she gets to the lab before then

## 2022-11-29 ENCOUNTER — APPOINTMENT (OUTPATIENT)
Dept: LAB | Facility: IMAGING CENTER | Age: 87
End: 2022-11-29

## 2022-11-29 DIAGNOSIS — N18.31 STAGE 3A CHRONIC KIDNEY DISEASE (HCC): ICD-10-CM

## 2022-11-29 DIAGNOSIS — E03.9 HYPOTHYROIDISM, UNSPECIFIED TYPE: ICD-10-CM

## 2022-11-29 DIAGNOSIS — I50.30 DIASTOLIC CHF WITH PRESERVED LEFT VENTRICULAR FUNCTION, NYHA CLASS 2 (HCC): ICD-10-CM

## 2022-11-29 LAB
ALBUMIN SERPL BCP-MCNC: 2.9 G/DL (ref 3.5–5)
ALP SERPL-CCNC: 85 U/L (ref 46–116)
ALT SERPL W P-5'-P-CCNC: 14 U/L (ref 12–78)
ANION GAP SERPL CALCULATED.3IONS-SCNC: 4 MMOL/L (ref 4–13)
AST SERPL W P-5'-P-CCNC: 20 U/L (ref 5–45)
BILIRUB SERPL-MCNC: 0.53 MG/DL (ref 0.2–1)
BUN SERPL-MCNC: 39 MG/DL (ref 5–25)
CALCIUM ALBUM COR SERPL-MCNC: 10.4 MG/DL (ref 8.3–10.1)
CALCIUM SERPL-MCNC: 9.5 MG/DL (ref 8.3–10.1)
CHLORIDE SERPL-SCNC: 96 MMOL/L (ref 96–108)
CO2 SERPL-SCNC: 30 MMOL/L (ref 21–32)
CREAT SERPL-MCNC: 1.21 MG/DL (ref 0.6–1.3)
ERYTHROCYTE [DISTWIDTH] IN BLOOD BY AUTOMATED COUNT: 14.5 % (ref 11.6–15.1)
GFR SERPL CREATININE-BSD FRML MDRD: 38 ML/MIN/1.73SQ M
GLUCOSE P FAST SERPL-MCNC: 88 MG/DL (ref 65–99)
HCT VFR BLD AUTO: 36.5 % (ref 34.8–46.1)
HGB BLD-MCNC: 11.1 G/DL (ref 11.5–15.4)
MCH RBC QN AUTO: 27.1 PG (ref 26.8–34.3)
MCHC RBC AUTO-ENTMCNC: 30.4 G/DL (ref 31.4–37.4)
MCV RBC AUTO: 89 FL (ref 82–98)
PLATELET # BLD AUTO: 394 THOUSANDS/UL (ref 149–390)
PMV BLD AUTO: 9.3 FL (ref 8.9–12.7)
POTASSIUM SERPL-SCNC: 4.9 MMOL/L (ref 3.5–5.3)
PROT SERPL-MCNC: 7.9 G/DL (ref 6.4–8.4)
RBC # BLD AUTO: 4.1 MILLION/UL (ref 3.81–5.12)
SODIUM SERPL-SCNC: 130 MMOL/L (ref 135–147)
TSH SERPL DL<=0.05 MIU/L-ACNC: 3.71 UIU/ML (ref 0.45–4.5)
WBC # BLD AUTO: 7.13 THOUSAND/UL (ref 4.31–10.16)

## 2022-11-30 ENCOUNTER — TELEMEDICINE (OUTPATIENT)
Dept: INTERNAL MEDICINE CLINIC | Age: 87
End: 2022-11-30

## 2022-11-30 VITALS
SYSTOLIC BLOOD PRESSURE: 145 MMHG | WEIGHT: 110 LBS | HEART RATE: 54 BPM | DIASTOLIC BLOOD PRESSURE: 63 MMHG | BODY MASS INDEX: 20.78 KG/M2 | OXYGEN SATURATION: 92 %

## 2022-11-30 DIAGNOSIS — I50.30 DIASTOLIC CHF WITH PRESERVED LEFT VENTRICULAR FUNCTION, NYHA CLASS 2 (HCC): ICD-10-CM

## 2022-11-30 DIAGNOSIS — I50.32 CHRONIC DIASTOLIC (CONGESTIVE) HEART FAILURE (HCC): ICD-10-CM

## 2022-11-30 DIAGNOSIS — D50.9 IRON DEFICIENCY ANEMIA, UNSPECIFIED IRON DEFICIENCY ANEMIA TYPE: ICD-10-CM

## 2022-11-30 DIAGNOSIS — C16.3: ICD-10-CM

## 2022-11-30 DIAGNOSIS — F33.9 DEPRESSION, RECURRENT (HCC): ICD-10-CM

## 2022-11-30 DIAGNOSIS — E03.9 HYPOTHYROIDISM, UNSPECIFIED TYPE: ICD-10-CM

## 2022-11-30 DIAGNOSIS — D38.1 NEOPLASM OF UNCERTAIN BEHAVIOR OF LUNG: ICD-10-CM

## 2022-11-30 DIAGNOSIS — K21.9 GASTROESOPHAGEAL REFLUX DISEASE WITHOUT ESOPHAGITIS: Primary | ICD-10-CM

## 2022-11-30 DIAGNOSIS — I10 ESSENTIAL HYPERTENSION: ICD-10-CM

## 2022-11-30 DIAGNOSIS — I11.0 HYPERTENSIVE HEART DISEASE WITH HEART FAILURE (HCC): ICD-10-CM

## 2022-11-30 DIAGNOSIS — N18.31 STAGE 3A CHRONIC KIDNEY DISEASE (HCC): ICD-10-CM

## 2022-11-30 DIAGNOSIS — I73.89 OTHER SPECIFIED PERIPHERAL VASCULAR DISEASES (HCC): ICD-10-CM

## 2022-11-30 RX ORDER — LOSARTAN POTASSIUM 50 MG/1
25 TABLET ORAL DAILY
COMMUNITY
Start: 2022-11-19

## 2022-11-30 NOTE — ASSESSMENT & PLAN NOTE
Wt Readings from Last 3 Encounters:   11/30/22 49 9 kg (110 lb)   10/28/22 54 2 kg (119 lb 6 4 oz)   10/17/22 53 4 kg (117 lb 12 8 oz)     Weight is stable  Will continue with present dose of diuretics and potassium supplement  Renal function is stable    Also being followed by cardiologist

## 2022-11-30 NOTE — ASSESSMENT & PLAN NOTE
Lab Results   Component Value Date    EGFR 38 11/29/2022    EGFR 62 10/06/2022    EGFR 37 07/26/2022    CREATININE 1 21 11/29/2022    CREATININE 0 82 10/06/2022    CREATININE 1 25 07/26/2022   Renal function is stable    Will continue to monitor

## 2022-11-30 NOTE — ASSESSMENT & PLAN NOTE
Will continue with present dose of levothyroxine    Will check thyroid function test before next visit

## 2022-11-30 NOTE — PROGRESS NOTES
Virtual Regular Visit    Verification of patient location:    Patient is located in the following state in which I hold an active license [place]      Assessment/Plan:    Problem List Items Addressed This Visit        Digestive    Gastroesophageal reflux disease - Primary     Stable with the omeprazole 40 mg daily  Primary malignant neoplasm of pyloric antrum (HCC)     In remission  Endocrine    Hypothyroidism     Will continue with present dose of levothyroxine  Will check thyroid function test before next visit            Respiratory    Neoplasm of uncertain behavior of lung       Cardiovascular and Mediastinum    Essential hypertension     Stable on present regimen  Relevant Medications    losartan (COZAAR) 50 mg tablet    Diastolic CHF with preserved left ventricular function, NYHA class 2 (HCC)     Wt Readings from Last 3 Encounters:   11/30/22 49 9 kg (110 lb)   10/28/22 54 2 kg (119 lb 6 4 oz)   10/17/22 53 4 kg (117 lb 12 8 oz)     Weight is stable  Will continue with present dose of diuretics and potassium supplement  Renal function is stable  Also being followed by cardiologist             Other specified peripheral vascular diseases ([facility] )    Chronic diastolic (congestive) heart failure ([facility] )    Hypertensive heart disease with heart failure ([facility] )       Genitourinary    Stage 3a chronic kidney disease ([facility] )     Lab Results   Component Value Date    EGFR 38 11/29/2022    EGFR 62 10/06/2022    EGFR 37 07/26/2022    CREATININE 1 21 11/29/2022    CREATININE 0 82 10/06/2022    CREATININE 1 25 07/26/2022   Renal function is stable  Will continue to monitor         Relevant Orders    Comprehensive metabolic panel       Other    Anemia     Hemoglobin is stable    Will continue to monitor         Relevant Orders    CBC    Depression, recurrent ([facility] )            Reason for visit is   Chief Complaint   Patient presents with   • Virtual Tcm     virtual TCM text message [phone]- d/c 11/18 lvm- Acute congestive heart failure (HCC)  Dyspnea on exertion  Hypoxia  - Pt states sh eis feeling good        Encounter provider Trent Fuentes MD    Provider located at [address] [id number]      Recent Visits  No visits were found meeting these conditions  Showing recent visits within past 7 days and meeting all other requirements  Today's Visits  Date Type Provider Dept   11/30/22 Telemedicine Trent Fuentes MD [facility]   Showing today's visits and meeting all other requirements  Future Appointments  No visits were found meeting these conditions  Showing future appointments within next 150 days and meeting all other requirements       The patient was identified by name and date of birth  Adeline Dubin was informed that this is a telemedicine visit and that the visit is being conducted through the [employer]  She agrees to proceed     My office door was closed  No one else was in the room  She acknowledged consent and understanding of privacy and security of the video platform  The patient has agreed to participate and understands they can discontinue the visit at any time  Patient is aware this is a billable service  Subjective  Adeline Dubin is a 80 y o  female occasional mild shortness of breath with physical exertion otherwise no chest pain   Still complaining of generalized fatigue  Occasional shortness of breath with physical exertion  Home oxygen saturation with exertion is in the range of 91-92 percent  Was admitted to hospital with worsening of congestive heart failure which is better now         Past Medical History:   Diagnosis Date   • Allergic    • Anemia    • Arthritis    • Cancer [facility])    • 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[facility] )    • Depression 7/15/2020   • Disease of thyroid gland    • Fatigue     last assessed 8/5/15; resolved 9/30/16   • Gastric cancer ([facility] ) 2013   • GERD (gastroesophageal reflux disease)    • Hyperlipidemia    • Hypertension    • Osteoporosis    • Senile cataract     last assessed 10/18/13; resolved 2/6/15   • Situational depression     last assessed 2/11/15; resolved 7/20/15   • Visual impairment        Past Surgical History:   Procedure Laterality Date   • COLONOSCOPY     • DENTAL SURGERY     • EYE SURGERY     • GASTRIC RESTRICTION SURGERY      for cancer   • JOINT REPLACEMENT     • KNEE SURGERY Right 01/2017    TKR   • LYMPH NODE BIOPSY     • STOMACH SURGERY         Current Outpatient Medications   Medication Sig Dispense Refill   • acetaminophen (TYLENOL) 500 mg tablet Take 1 tablet by mouth if needed     • amLODIPine (NORVASC) 5 mg tablet Take 1 tablet (5 mg total) by mouth 2 (two) times a day 180 tablet 1   • BABY ASPIRIN PO Take 81 mg by mouth in the morning     • bisoprolol (ZEBETA) 5 mg tablet Take 1 tablet (5 mg total) by mouth daily Hold for heart rate < 50/min   90 tablet 1   • calcium carbonate (TUMS EX) 750 mg chewable tablet Chew 750 mg Three times daily as needed     • carboxymethylcellulose 0 5 % SOLN Apply 1 drop to eye every other day      • Cholecalciferol (VITAMIN D3) 1000 units CAPS Take 1 tablet by mouth daily     • ciprofloxacin (CILOXAN) 0 3 % ophthalmic solution Administer 1 drop into the left eye every other day       • clotrimazole-betamethasone (LOTRISONE) 1-0 05 % cream Apply 1 application topically in the morning To affected area     • erythromycin (ILOTYCIN) ophthalmic ointment APPLY TO BOTH EYES AT BEDTIME     • ferrous sulfate (FeroSul) 325 (65 Fe) mg tablet Take 1 tablet (325 mg total) by mouth 2 (two) times a day with meals (Patient taking differently: Take 325 mg by mouth daily with breakfast)     • furosemide (LASIX) 20 mg tablet Take 20 mg by mouth if needed (edema) Total 60 mg daily as per cardiology  per daughter pt takes 40 mg daily and an extra 20 mg daily as needed for swelling      • furosemide (LASIX) 40 mg tablet Take 1 5 tablets (60 mg total) by mouth daily (Patient taking differently: Take 40 mg by mouth daily) 45 tablet 6   • levothyroxine 25 mcg tablet Take 1 tablet (25 mcg total) by mouth daily 90 tablet 1   • losartan (COZAAR) 50 mg tablet Take 25 mg by mouth daily     • mirtazapine (REMERON) 7 5 MG tablet Take 1 tablet (7 5 mg total) by mouth daily at bedtime 90 tablet 1   • Multiple Vitamins-Minerals (OCUVITE PRESERVISION PO) Take 1 tablet by mouth daily     • omeprazole (PriLOSEC) 40 MG capsule Take 1 capsule (40 mg total) by mouth daily 90 capsule 1   • polyethylene glycol-propylene glycol (SYSTANE) 0 4-0 3 % Administer 2 drops to both eyes as needed       • potassium chloride (MICRO-K) 10 MEQ CR capsule Take 2 capsules (20 mEq total) by mouth 2 (two) times a day (Patient taking differently: Take 20 mEq by mouth 2 (two) times a day daughter reports patient takes this 3 times per day) 360 capsule 0   • prednisoLONE acetate (PRED FORTE) 1 % ophthalmic suspension Administer 1 drop into the left eye every other day       • pyridoxine (VITAMIN B6) 100 mg tablet Take 100 mg by mouth daily       • Aspirin 325 MG CAPS Take 325 mg by mouth daily (Patient not taking: Reported on 10/28/2022)     • methylPREDNISolone 4 MG tablet therapy pack Use as directed on package 21 each 0     No current facility-administered medications for this visit          Allergies   Allergen Reactions   • Latex Rash   • Oxycodone Nausea Only, Hallucinations and Other (See Comments)     Hallucinations  Hallucinations  Other reaction(s): Hallucinations  Hallucinations  Hallucinations  Other reaction(s): Hallucinations  Hallucinations  Other reaction(s): Hallucinations, Other (See Comments)  Hallucinations  Hallucinations  Other reaction(s): Hallucinations  Hallucinations   • Oxycodone-Acetaminophen Nausea Only and Hallucinations   • Pollen Extract Allergic Rhinitis       Review of Systems   Constitutional: Positive for fatigue  Negative for fever  HENT: Negative for congestion, ear discharge, ear pain, postnasal drip, sinus pressure, sore throat, tinnitus and trouble swallowing  Eyes: Negative for discharge, itching and visual disturbance  Respiratory: Positive for shortness of breath  Negative for cough  Cardiovascular: Positive for leg swelling  Negative for chest pain and palpitations  Gastrointestinal: Negative for abdominal pain, diarrhea, nausea and vomiting  Endocrine: Negative for cold intolerance and polyuria  Genitourinary: Negative for difficulty urinating, dysuria and urgency  Musculoskeletal: Negative for arthralgias and neck pain  Skin: Negative for rash  Allergic/Immunologic: Negative for environmental allergies  Neurological: Negative for dizziness, weakness and headaches  Psychiatric/Behavioral: Negative for agitation and behavioral problems  The patient is not nervous/anxious  Video Exam    Vitals:    11/30/22 1634   BP: 145/63   BP Location: Left arm   Patient Position: Sitting   Cuff Size: Standard   Pulse: (!) 54   SpO2: 92%   Weight: 49 9 kg (110 lb)       Physical Exam  Constitutional:       Appearance: Normal appearance  HENT:      Right Ear: External ear normal       Left Ear: External ear normal       Nose: No rhinorrhea  Mouth/Throat:      Pharynx: No posterior oropharyngeal erythema  Eyes:      Conjunctiva/sclera: Conjunctivae normal    Cardiovascular:      Comments: Trace bilateral lower extremity edema present  Pulmonary:      Effort: Pulmonary effort is normal  No respiratory distress  Musculoskeletal:      Right lower leg: Edema present  Left lower leg: Edema present  Neurological:      Mental Status: She is oriented to person, place, and time     Psychiatric:         Mood and Affect: Mood normal          Behavior: Behavior normal           I spent 15 minutes directly with the patient during this visit

## 2022-12-07 DIAGNOSIS — F33.9 DEPRESSION, RECURRENT (HCC): ICD-10-CM

## 2022-12-07 DIAGNOSIS — Z85.028 HISTORY OF GASTRIC CANCER: ICD-10-CM

## 2022-12-07 RX ORDER — OMEPRAZOLE 40 MG/1
40 CAPSULE, DELAYED RELEASE ORAL DAILY
Qty: 90 CAPSULE | Refills: 1 | Status: SHIPPED | OUTPATIENT
Start: 2022-12-07

## 2022-12-07 RX ORDER — MIRTAZAPINE 7.5 MG/1
7.5 TABLET, FILM COATED ORAL
Qty: 90 TABLET | Refills: 1 | Status: SHIPPED | OUTPATIENT
Start: 2022-12-07 | End: 2023-01-06

## 2022-12-09 DIAGNOSIS — I10 ESSENTIAL HYPERTENSION: ICD-10-CM

## 2022-12-09 RX ORDER — BISOPROLOL FUMARATE 5 MG/1
5 TABLET, FILM COATED ORAL DAILY
Qty: 90 TABLET | Refills: 1 | Status: SHIPPED | OUTPATIENT
Start: 2022-12-09

## 2022-12-13 ENCOUNTER — OFFICE VISIT (OUTPATIENT)
Dept: INTERNAL MEDICINE CLINIC | Facility: OTHER | Age: 87
End: 2022-12-13

## 2022-12-13 VITALS
HEART RATE: 55 BPM | HEIGHT: 61 IN | OXYGEN SATURATION: 95 % | TEMPERATURE: 98 F | SYSTOLIC BLOOD PRESSURE: 144 MMHG | WEIGHT: 110 LBS | BODY MASS INDEX: 20.77 KG/M2 | DIASTOLIC BLOOD PRESSURE: 68 MMHG

## 2022-12-13 DIAGNOSIS — I87.321 STASIS DERMATITIS OF RIGHT LOWER EXTREMITY DUE TO PERIPHERAL VENOUS HYPERTENSION: ICD-10-CM

## 2022-12-13 DIAGNOSIS — I50.32 CHRONIC DIASTOLIC (CONGESTIVE) HEART FAILURE (HCC): ICD-10-CM

## 2022-12-13 DIAGNOSIS — E44.0 MODERATE PROTEIN-ENERGY MALNUTRITION (HCC): ICD-10-CM

## 2022-12-13 DIAGNOSIS — C16.3: ICD-10-CM

## 2022-12-13 DIAGNOSIS — I10 ESSENTIAL HYPERTENSION: ICD-10-CM

## 2022-12-13 DIAGNOSIS — I50.30 DIASTOLIC CHF WITH PRESERVED LEFT VENTRICULAR FUNCTION, NYHA CLASS 2 (HCC): ICD-10-CM

## 2022-12-13 DIAGNOSIS — I73.89 OTHER SPECIFIED PERIPHERAL VASCULAR DISEASES (HCC): ICD-10-CM

## 2022-12-13 DIAGNOSIS — F33.9 DEPRESSION, RECURRENT (HCC): ICD-10-CM

## 2022-12-13 DIAGNOSIS — N18.31 STAGE 3A CHRONIC KIDNEY DISEASE (HCC): ICD-10-CM

## 2022-12-13 DIAGNOSIS — I11.0 HYPERTENSIVE HEART DISEASE WITH HEART FAILURE (HCC): ICD-10-CM

## 2022-12-13 DIAGNOSIS — K21.9 GASTROESOPHAGEAL REFLUX DISEASE WITHOUT ESOPHAGITIS: Primary | ICD-10-CM

## 2022-12-13 DIAGNOSIS — E03.9 HYPOTHYROIDISM, UNSPECIFIED TYPE: ICD-10-CM

## 2022-12-13 NOTE — ASSESSMENT & PLAN NOTE
Wt Readings from Last 3 Encounters:   12/13/22 49 9 kg (110 lb)   11/30/22 49 9 kg (110 lb)   10/28/22 54 2 kg (119 lb 6 4 oz)       Stable on present regimen    Weight is stable

## 2022-12-13 NOTE — PROGRESS NOTES
Assessment/Plan:    Gastroesophageal reflux disease  Doing well on present regimen  Primary malignant neoplasm of pyloric antrum (HCC)  In remission  Hypothyroidism  Continue with present dose of levothyroxine  Thyroid function test are in normal range    Essential hypertension  Blood pressure is stable on present regimen    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  Presently being managed by [facility] care center    Diastolic CHF with preserved left ventricular function, NYHA class 2 (HCC)  Wt Readings from Last 3 Encounters:   12/13/22 49 9 kg (110 lb)   11/30/22 49 9 kg (110 lb)   10/28/22 54 2 kg (119 lb 6 4 oz)       Stable on present regimen  Weight is stable      Chronic diastolic (congestive) heart failure (HCC)  Wt Readings from Last 3 Encounters:   12/13/22 49 9 kg (110 lb)   11/30/22 49 9 kg (110 lb)   10/28/22 54 2 kg (119 lb 6 4 oz)         Continue with present dose of diuretics  Weight is stable  Also being followed by cardiologist    Stage 3a chronic kidney disease [facility])  Lab Results   Component Value Date    EGFR 38 11/29/2022    EGFR 62 10/06/2022    EGFR 37 07/26/2022    CREATININE 1 21 11/29/2022    CREATININE 0 82 10/06/2022    CREATININE 1 25 07/26/2022   Renal function is stable  We will continue to monitor    Depression, recurrent ([address] )  Stable on present regimen  Moderate protein-energy malnutrition ([address] )  Continue with protein supplement  BMI Findings: Body mass index is 20 78 kg/m²            Diagnoses and all orders for this visit:    Gastroesophageal reflux disease without esophagitis    Primary malignant neoplasm of pyloric antrum (HCC)    Hypothyroidism, unspecified type    Diastolic CHF with preserved left ventricular function, NYHA class 2 (HCC)    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    Other specified peripheral vascular diseases (HCC)    Chronic diastolic (congestive) heart failure (HCC)    Hypertensive heart disease with heart failure (HCC)    Stage 3a chronic kidney disease (HCC)    Moderate protein-energy malnutrition ([address] )    Essential hypertension    Depression, recurrent (HCC)               Subjective:          Patient ID: Shay Pelletier is a 80 y o  female  Patient is here for follow-up  Also had blood work done would like to discuss results  Otherwise she is doing okay  No significant shortness of breath or chest pain  Lower extremity swelling is better  The following portions of the patient's history were reviewed and updated as appropriate: allergies, current medications, past family history, past medical history, past social history, past surgical history and problem list     Review of Systems   Constitutional: Negative for fatigue and fever  HENT: Negative for congestion, ear discharge, ear pain, postnasal drip, sinus pressure, sore throat, tinnitus and trouble swallowing  Eyes: Negative for discharge, itching and visual disturbance  Respiratory: Negative for cough and shortness of breath  Cardiovascular: Positive for leg swelling  Negative for chest pain and palpitations  Gastrointestinal: Negative for abdominal pain, diarrhea, nausea and vomiting  Endocrine: Negative for cold intolerance and polyuria  Genitourinary: Negative for difficulty urinating, dysuria and urgency  Musculoskeletal: Positive for gait problem  Negative for arthralgias and neck pain  Skin: Negative for rash  Allergic/Immunologic: Negative for environmental allergies  Neurological: Negative for dizziness, weakness and headaches  Psychiatric/Behavioral: Negative for agitation  The patient is not nervous/anxious            Past Medical History:   Diagnosis Date   • Allergic    • Anemia    • Arthritis    • Cancer [facility])    • 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[facility] [address] )    • Depression 7/15/2020   • Disease of thyroid gland    • Fatigue     last assessed 8/5/15; resolved 9/30/16   • Gastric cancer ([facility] [address] ) 2013   • GERD (gastroesophageal reflux disease)    • Hyperlipidemia    • Hypertension    • Osteoporosis    • Senile cataract     last assessed 10/18/13; resolved 2/6/15   • Situational depression     last assessed 2/11/15; resolved 7/20/15   • Visual impairment          Current Outpatient Medications:   •  acetaminophen (TYLENOL) 500 mg tablet, Take 1 tablet by mouth if needed, Disp: , Rfl:   •  BABY ASPIRIN PO, Take 81 mg by mouth in the morning, Disp: , Rfl:   •  bisoprolol (ZEBETA) 5 mg tablet, Take 1 tablet (5 mg total) by mouth daily Hold for heart rate < 50/min , Disp: 90 tablet, Rfl: 1  •  calcium carbonate (TUMS EX) 750 mg chewable tablet, Chew 750 mg Three times daily as needed, Disp: , Rfl:   •  carboxymethylcellulose 0 5 % SOLN, Apply 1 drop to eye every other day , Disp: , Rfl:   •  Cholecalciferol (VITAMIN D3) 1000 units CAPS, Take 1 tablet by mouth daily, Disp: , Rfl:   •  ciprofloxacin (CILOXAN) 0 3 % ophthalmic solution, Administer 1 drop into the left eye every other day  , Disp: , Rfl:   •  erythromycin (ILOTYCIN) ophthalmic ointment, APPLY TO BOTH EYES AT BEDTIME, Disp: , Rfl:   •  ferrous sulfate (FeroSul) 325 (65 Fe) mg tablet, Take 1 tablet (325 mg total) by mouth 2 (two) times a day with meals (Patient taking differently: Take 325 mg by mouth daily with breakfast), Disp: , Rfl:   •  furosemide (LASIX) 20 mg tablet, Take 20 mg by mouth if needed (edema) Total 60 mg daily as per cardiology  per daughter pt takes 40 mg daily and an extra 20 mg daily as needed for swelling , Disp: , Rfl:   •  levothyroxine 25 mcg tablet, Take 1 tablet (25 mcg total) by mouth daily, Disp: 90 tablet, Rfl: 1  •  losartan (COZAAR) 50 mg tablet, Take 25 mg by mouth daily, Disp: , Rfl:   •  mirtazapine (REMERON) 7 5 MG tablet, Take 1 tablet (7 5 mg total) by mouth daily at bedtime, Disp: 90 tablet, Rfl: 1  •  Multiple Vitamins-Minerals (OCUVITE PRESERVISION PO), Take 1 tablet by mouth daily, Disp: , Rfl:   •  omeprazole (PriLOSEC) 40 MG capsule, Take 1 capsule (40 mg total) by mouth daily, Disp: 90 capsule, Rfl: 1  •  polyethylene glycol-propylene glycol (SYSTANE) 0 4-0 3 %, Administer 2 drops to both eyes as needed  , Disp: , Rfl:   •  potassium chloride (MICRO-K) 10 MEQ CR capsule, Take 2 capsules (20 mEq total) by mouth 2 (two) times a day (Patient taking differently: Take 20 mEq by mouth 2 (two) times a day daughter reports patient takes this 3 times per day), Disp: 360 capsule, Rfl: 0  •  prednisoLONE acetate (PRED FORTE) 1 % ophthalmic suspension, Administer 1 drop into the left eye every other day  , Disp: , Rfl:   •  pyridoxine (VITAMIN B6) 100 mg tablet, Take 100 mg by mouth daily  , Disp: , Rfl:   •  amLODIPine (NORVASC) 5 mg tablet, Take 1 tablet (5 mg total) by mouth 2 (two) times a day (Patient not taking: Reported on 12/13/2022), Disp: 180 tablet, Rfl: 1  •  clotrimazole-betamethasone (LOTRISONE) 1-0 05 % cream, Apply 1 application topically in the morning To affected area (Patient not taking: Reported on 12/13/2022), Disp: , Rfl:   •  furosemide (LASIX) 40 mg tablet, Take 1 5 tablets (60 mg total) by mouth daily (Patient taking differently: Take 40 mg by mouth daily), Disp: 45 tablet, Rfl: 6    Allergies   Allergen Reactions   • Latex Rash   • Oxycodone Nausea Only, Hallucinations and Other (See Comments)     Hallucinations  Hallucinations  Other reaction(s): Hallucinations  Hallucinations  Hallucinations  Other reaction(s): Hallucinations  Hallucinations  Other reaction(s): Hallucinations, Other (See Comments)  Hallucinations  Hallucinations  Other reaction(s): Hallucinations  Hallucinations   • Oxycodone-Acetaminophen Nausea Only and Hallucinations   • Pollen Extract Allergic Rhinitis       Social History   Past Surgical History:   Procedure Laterality Date   • COLONOSCOPY     • DENTAL SURGERY     • EYE SURGERY     • GASTRIC RESTRICTION SURGERY      for cancer   • JOINT REPLACEMENT     • KNEE SURGERY Right 01/2017    TKR   • LYMPH NODE BIOPSY     • STOMACH SURGERY       Family History   Problem Relation Age of Onset   • Heart attack Mother    • Diabetes Mother    • Heart disease Mother    • Prostate cancer Father        Objective:  /68 (BP Location: Left arm, Patient Position: Sitting, Cuff Size: Adult)   Pulse 55   Temp 98 °F (36 7 °C) (Temporal)   Ht 5' 1" (1 549 m)   Wt 49 9 kg (110 lb)   SpO2 95%   BMI 20 78 kg/m²   Body mass index is 20 78 kg/m²  Physical Exam  Constitutional:       Appearance: She is well-developed  HENT:      Head: Normocephalic  Right Ear: External ear normal       Left Ear: External ear normal       Nose: No rhinorrhea  Mouth/Throat:      Pharynx: No posterior oropharyngeal erythema  Eyes:      General: No scleral icterus  Pupils: Pupils are equal, round, and reactive to light  Neck:      Thyroid: No thyromegaly  Trachea: No tracheal deviation  Cardiovascular:      Rate and Rhythm: Normal rate  Rhythm irregular  Heart sounds: Normal heart sounds  Pulmonary:      Effort: Pulmonary effort is normal  No respiratory distress  Breath sounds: Normal breath sounds  Chest:      Chest wall: No tenderness  Abdominal:      General: Bowel sounds are normal       Palpations: Abdomen is soft  There is no mass  Tenderness: There is no abdominal tenderness  Musculoskeletal:         General: Normal range of motion  Cervical back: Normal range of motion and neck supple  Right lower leg: Edema present  Left lower leg: Edema present  Lymphadenopathy:      Cervical: No cervical adenopathy  Skin:     General: Skin is warm  Findings: Erythema present  Neurological:      Mental Status: She is alert and oriented to person, place, and time  Cranial Nerves: No cranial nerve deficit     Psychiatric:         Mood and Affect: Mood normal          Behavior: Behavior normal

## 2022-12-13 NOTE — ASSESSMENT & PLAN NOTE
Lab Results   Component Value Date    EGFR 38 11/29/2022    EGFR 62 10/06/2022    EGFR 37 07/26/2022    CREATININE 1 21 11/29/2022    CREATININE 0 82 10/06/2022    CREATININE 1 25 07/26/2022   Renal function is stable    We will continue to monitor

## 2022-12-13 NOTE — ASSESSMENT & PLAN NOTE
Wt Readings from Last 3 Encounters:   12/13/22 49 9 kg (110 lb)   11/30/22 49 9 kg (110 lb)   10/28/22 54 2 kg (119 lb 6 4 oz)         Continue with present dose of diuretics  Weight is stable    Also being followed by cardiologist

## 2022-12-14 ENCOUNTER — TELEPHONE (OUTPATIENT)
Dept: LAB | Facility: HOSPITAL | Age: 87
End: 2022-12-14

## 2023-01-01 ENCOUNTER — TRANSITIONAL CARE MANAGEMENT (OUTPATIENT)
Dept: INTERNAL MEDICINE CLINIC | Facility: OTHER | Age: 88
End: 2023-01-01

## 2023-01-01 ENCOUNTER — TELEPHONE (OUTPATIENT)
Dept: INTERNAL MEDICINE CLINIC | Facility: OTHER | Age: 88
End: 2023-01-01

## 2023-01-01 DIAGNOSIS — N18.31 STAGE 3A CHRONIC KIDNEY DISEASE (HCC): ICD-10-CM

## 2023-01-01 DIAGNOSIS — E44.0 MODERATE PROTEIN-ENERGY MALNUTRITION (HCC): ICD-10-CM

## 2023-01-01 DIAGNOSIS — N18.31 STAGE 3A CHRONIC KIDNEY DISEASE (HCC): Primary | ICD-10-CM

## 2023-01-01 DIAGNOSIS — I50.30 DIASTOLIC CHF WITH PRESERVED LEFT VENTRICULAR FUNCTION, NYHA CLASS 2 (HCC): Primary | ICD-10-CM

## 2023-01-01 DIAGNOSIS — R26.2 AMBULATORY DYSFUNCTION: ICD-10-CM

## 2023-01-01 DIAGNOSIS — R60.0 BILATERAL EDEMA OF LOWER EXTREMITY: ICD-10-CM

## 2023-01-01 LAB
DME PARACHUTE DELIVERY DATE ACTUAL: NORMAL
DME PARACHUTE DELIVERY DATE EXPECTED: NORMAL
DME PARACHUTE DELIVERY DATE REQUESTED: NORMAL
DME PARACHUTE ITEM DESCRIPTION: NORMAL
DME PARACHUTE ORDER STATUS: NORMAL
DME PARACHUTE SUPPLIER NAME: NORMAL
DME PARACHUTE SUPPLIER PHONE: NORMAL

## 2023-01-26 ENCOUNTER — APPOINTMENT (OUTPATIENT)
Dept: LAB | Facility: HOSPITAL | Age: 88
End: 2023-01-26
Attending: INTERNAL MEDICINE

## 2023-01-26 DIAGNOSIS — N18.31 STAGE 3A CHRONIC KIDNEY DISEASE (HCC): ICD-10-CM

## 2023-01-26 DIAGNOSIS — D50.9 IRON DEFICIENCY ANEMIA, UNSPECIFIED IRON DEFICIENCY ANEMIA TYPE: ICD-10-CM

## 2023-01-26 LAB
ALBUMIN SERPL BCP-MCNC: 3.2 G/DL (ref 3.5–5)
ALP SERPL-CCNC: 85 U/L (ref 46–116)
ALT SERPL W P-5'-P-CCNC: 14 U/L (ref 12–78)
ANION GAP SERPL CALCULATED.3IONS-SCNC: 3 MMOL/L (ref 4–13)
AST SERPL W P-5'-P-CCNC: 19 U/L (ref 5–45)
BILIRUB SERPL-MCNC: 0.42 MG/DL (ref 0.2–1)
BUN SERPL-MCNC: 36 MG/DL (ref 5–25)
CALCIUM ALBUM COR SERPL-MCNC: 10 MG/DL (ref 8.3–10.1)
CALCIUM SERPL-MCNC: 9.4 MG/DL (ref 8.3–10.1)
CHLORIDE SERPL-SCNC: 96 MMOL/L (ref 96–108)
CO2 SERPL-SCNC: 31 MMOL/L (ref 21–32)
CREAT SERPL-MCNC: 1.28 MG/DL (ref 0.6–1.3)
ERYTHROCYTE [DISTWIDTH] IN BLOOD BY AUTOMATED COUNT: 15.5 % (ref 11.6–15.1)
GFR SERPL CREATININE-BSD FRML MDRD: 36 ML/MIN/1.73SQ M
GLUCOSE P FAST SERPL-MCNC: 85 MG/DL (ref 65–99)
HCT VFR BLD AUTO: 32.9 % (ref 34.8–46.1)
HGB BLD-MCNC: 10.2 G/DL (ref 11.5–15.4)
MCH RBC QN AUTO: 27.9 PG (ref 26.8–34.3)
MCHC RBC AUTO-ENTMCNC: 31 G/DL (ref 31.4–37.4)
MCV RBC AUTO: 90 FL (ref 82–98)
PLATELET # BLD AUTO: 240 THOUSANDS/UL (ref 149–390)
PMV BLD AUTO: 9.7 FL (ref 8.9–12.7)
POTASSIUM SERPL-SCNC: 4.6 MMOL/L (ref 3.5–5.3)
PROT SERPL-MCNC: 7.6 G/DL (ref 6.4–8.4)
RBC # BLD AUTO: 3.65 MILLION/UL (ref 3.81–5.12)
SODIUM SERPL-SCNC: 130 MMOL/L (ref 135–147)
WBC # BLD AUTO: 5.24 THOUSAND/UL (ref 4.31–10.16)

## 2023-01-31 ENCOUNTER — OFFICE VISIT (OUTPATIENT)
Dept: INTERNAL MEDICINE CLINIC | Facility: OTHER | Age: 88
End: 2023-01-31

## 2023-01-31 VITALS
BODY MASS INDEX: 21.34 KG/M2 | TEMPERATURE: 98.3 F | DIASTOLIC BLOOD PRESSURE: 78 MMHG | OXYGEN SATURATION: 98 % | HEIGHT: 61 IN | WEIGHT: 113 LBS | HEART RATE: 58 BPM | SYSTOLIC BLOOD PRESSURE: 130 MMHG

## 2023-01-31 DIAGNOSIS — I73.89 OTHER SPECIFIED PERIPHERAL VASCULAR DISEASES (HCC): ICD-10-CM

## 2023-01-31 DIAGNOSIS — I50.32 CHRONIC DIASTOLIC (CONGESTIVE) HEART FAILURE (HCC): ICD-10-CM

## 2023-01-31 DIAGNOSIS — I10 ESSENTIAL HYPERTENSION: ICD-10-CM

## 2023-01-31 DIAGNOSIS — F33.9 DEPRESSION, RECURRENT (HCC): ICD-10-CM

## 2023-01-31 DIAGNOSIS — K21.9 GASTROESOPHAGEAL REFLUX DISEASE WITHOUT ESOPHAGITIS: ICD-10-CM

## 2023-01-31 DIAGNOSIS — E03.9 HYPOTHYROIDISM, UNSPECIFIED TYPE: Primary | ICD-10-CM

## 2023-01-31 DIAGNOSIS — E44.0 MODERATE PROTEIN-ENERGY MALNUTRITION (HCC): ICD-10-CM

## 2023-01-31 DIAGNOSIS — C16.3: ICD-10-CM

## 2023-01-31 DIAGNOSIS — N18.31 STAGE 3A CHRONIC KIDNEY DISEASE (HCC): ICD-10-CM

## 2023-01-31 DIAGNOSIS — I11.0 HYPERTENSIVE HEART DISEASE WITH HEART FAILURE (HCC): ICD-10-CM

## 2023-01-31 DIAGNOSIS — E87.1 HYPONATREMIA: ICD-10-CM

## 2023-01-31 RX ORDER — POTASSIUM CHLORIDE 750 MG/1
20 CAPSULE, EXTENDED RELEASE ORAL 3 TIMES DAILY
Qty: 540 CAPSULE | Refills: 0 | Status: SHIPPED | OUTPATIENT
Start: 2023-01-31 | End: 2023-05-01

## 2023-01-31 NOTE — PROGRESS NOTES
Assessment/Plan:    Stage 3a chronic kidney disease [facility])  Lab Results   Component Value Date    EGFR 36 01/26/2023    EGFR 38 11/29/2022    EGFR 62 10/06/2022    CREATININE 1 28 01/26/2023    CREATININE 1 21 11/29/2022    CREATININE 0 82 10/06/2022   Renal function is stable  We will continue to monitor    Hypertensive heart disease with heart failure ([facility] )  Wt Readings from Last 3 Encounters:   01/31/23 51 3 kg (113 lb)   12/13/22 49 9 kg (110 lb)   11/30/22 49 9 kg (110 lb)       Stable on present regimen  Also being followed by cardiology      Other specified peripheral vascular diseases ([facility] )  Stable  No new symptoms    Chronic diastolic (congestive) heart failure (HCC)  Wt Readings from Last 3 Encounters:   01/31/23 51 3 kg (113 lb)   12/13/22 49 9 kg (110 lb)   11/30/22 49 9 kg (110 lb)   Blood pressure and weight at home are stable  Continue with present dose of diuretic regimen          Essential hypertension  Stable on present regimen  Continue to monitor at home    Primary malignant neoplasm of pyloric antrum ([facility] )  In remission  Gastroesophageal reflux disease  Stable on present regimen    Hypothyroidism  With present dose of levothyroxine  We will check thyroid function test before next visit       Diagnoses and all orders for this visit:    Hypothyroidism, unspecified type  -     TSH, 3rd generation with Free T4 reflex; Future    Essential hypertension  -     potassium chloride (MICRO-K) 10 MEQ CR capsule; Take 2 capsules (20 mEq total) by mouth 3 (three) times a day  -     CBC; Future    Primary malignant neoplasm of pyloric antrum (HCC)    Gastroesophageal reflux disease without esophagitis    Other specified peripheral vascular diseases (HCC)    Chronic diastolic (congestive) heart failure (HCC)    Hypertensive heart disease with heart failure (HCC)    Stage 3a chronic kidney disease ([facility] )  -     Comprehensive metabolic panel;  Future    Moderate protein-energy malnutrition ([place] [address] )    Depression, recurrent ([place] [address] )    Hyponatremia            Falls Plan of Care: balance, strength, and gait training instructions were provided  Subjective:          Patient ID: Rowena Fregoso is a 80 y o  female  Patient is here for regular follow-up  Also have blood work done would like to discuss results  Denies any new chest pain  Mild shortness of breath with physical exertion  Patient is accompanied by daughter  The following portions of the patient's history were reviewed and updated as appropriate: allergies, current medications, past family history, past medical history, past social history, past surgical history and problem list     Review of Systems   Constitutional: Negative for fatigue and fever  HENT: Negative for congestion, ear discharge, ear pain, postnasal drip, sinus pressure, sore throat, tinnitus and trouble swallowing  Eyes: Negative for discharge, itching and visual disturbance  Respiratory: Negative for cough and shortness of breath  Cardiovascular: Positive for leg swelling  Negative for chest pain and palpitations  Gastrointestinal: Negative for abdominal pain, diarrhea, nausea and vomiting  Endocrine: Negative for cold intolerance and polyuria  Genitourinary: Negative for difficulty urinating, dysuria and urgency  Musculoskeletal: Positive for gait problem  Negative for arthralgias and neck pain  Skin: Negative for rash  Allergic/Immunologic: Negative for environmental allergies  Neurological: Negative for dizziness, weakness and headaches  Psychiatric/Behavioral: The patient is not nervous/anxious            Past Medical History:   Diagnosis Date   • Allergic    • Anemia    • Arthritis    • Cancer [facility])    • 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[place] [address] )    • Depression 7/15/2020   • Disease of thyroid gland    • Fatigue     last assessed 8/5/15; resolved 9/30/16   • Gastric cancer ([place] [address] ) 2013   • GERD (gastroesophageal reflux disease)    • Hyperlipidemia • Hypertension    • Osteoporosis    • Senile cataract     last assessed 10/18/13; resolved 2/6/15   • Situational depression     last assessed 2/11/15; resolved 7/20/15   • Visual impairment          Current Outpatient Medications:   •  BABY ASPIRIN PO, Take 81 mg by mouth in the morning, Disp: , Rfl:   •  bisoprolol (ZEBETA) 5 mg tablet, Take 1 tablet (5 mg total) by mouth daily Hold for heart rate < 50/min , Disp: 90 tablet, Rfl: 1  •  calcium carbonate (TUMS EX) 750 mg chewable tablet, Chew 750 mg Three times daily as needed, Disp: , Rfl:   •  carboxymethylcellulose 0 5 % SOLN, Apply 1 drop to eye every other day , Disp: , Rfl:   •  Cholecalciferol (VITAMIN D3) 1000 units CAPS, Take 1 tablet by mouth daily, Disp: , Rfl:   •  ciprofloxacin (CILOXAN) 0 3 % ophthalmic solution, Administer 1 drop into the left eye every other day  , Disp: , Rfl:   •  clotrimazole-betamethasone (LOTRISONE) 1-0 05 % cream, Apply 1 application topically in the morning To affected area, Disp: , Rfl:   •  erythromycin (ILOTYCIN) ophthalmic ointment, APPLY TO BOTH EYES AT BEDTIME, Disp: , Rfl:   •  ferrous sulfate (FeroSul) 325 (65 Fe) mg tablet, Take 1 tablet (325 mg total) by mouth 2 (two) times a day with meals (Patient taking differently: Take 325 mg by mouth daily with breakfast), Disp: , Rfl:   •  furosemide (LASIX) 20 mg tablet, Take 20 mg by mouth if needed (edema) Total 60 mg daily as per cardiology  per daughter pt takes 40 mg daily and an extra 20 mg daily as needed for swelling , Disp: , Rfl:   •  levothyroxine 25 mcg tablet, Take 1 tablet (25 mcg total) by mouth daily, Disp: 90 tablet, Rfl: 1  •  losartan (COZAAR) 50 mg tablet, Take 25 mg by mouth daily, Disp: , Rfl:   •  Multiple Vitamins-Minerals (OCUVITE PRESERVISION PO), Take 1 tablet by mouth daily, Disp: , Rfl:   •  omeprazole (PriLOSEC) 40 MG capsule, Take 1 capsule (40 mg total) by mouth daily, Disp: 90 capsule, Rfl: 1  •  polyethylene glycol-propylene glycol (SYSTANE) 0 4-0 3 %, Administer 2 drops to both eyes as needed  , Disp: , Rfl:   •  potassium chloride (MICRO-K) 10 MEQ CR capsule, Take 2 capsules (20 mEq total) by mouth 3 (three) times a day, Disp: 540 capsule, Rfl: 0  •  prednisoLONE acetate (PRED FORTE) 1 % ophthalmic suspension, Administer 1 drop into the left eye every other day  , Disp: , Rfl:   •  pyridoxine (VITAMIN B6) 100 mg tablet, Take 100 mg by mouth daily  , Disp: , Rfl:   •  acetaminophen (TYLENOL) 500 mg tablet, Take 1 tablet by mouth if needed, Disp: , Rfl:   •  amLODIPine (NORVASC) 5 mg tablet, Take 1 tablet (5 mg total) by mouth 2 (two) times a day, Disp: 180 tablet, Rfl: 1  •  furosemide (LASIX) 40 mg tablet, Take 1 5 tablets (60 mg total) by mouth daily (Patient taking differently: Take 40 mg by mouth daily), Disp: 45 tablet, Rfl: 6  •  mirtazapine (REMERON) 7 5 MG tablet, Take 1 tablet (7 5 mg total) by mouth daily at bedtime, Disp: 90 tablet, Rfl: 1    Allergies   Allergen Reactions   • Latex Rash   • Oxycodone Nausea Only, Hallucinations and Other (See Comments)     Hallucinations  Hallucinations  Other reaction(s): Hallucinations  Hallucinations  Hallucinations  Other reaction(s): Hallucinations  Hallucinations  Other reaction(s): Hallucinations, Other (See Comments)  Hallucinations  Hallucinations  Other reaction(s): Hallucinations  Hallucinations   • Oxycodone-Acetaminophen Nausea Only and Hallucinations   • Pollen Extract Allergic Rhinitis       Social History   Past Surgical History:   Procedure Laterality Date   • COLONOSCOPY     • DENTAL SURGERY     • EYE SURGERY     • GASTRIC RESTRICTION SURGERY      for cancer   • JOINT REPLACEMENT     • KNEE SURGERY Right 01/2017    TKR   • LYMPH NODE BIOPSY     • STOMACH SURGERY       Family History   Problem Relation Age of Onset   • Heart attack Mother    • Diabetes Mother    • Heart disease Mother    • Prostate cancer Father        Objective:  /78 (BP Location: Left arm, Patient Position: Sitting, Cuff Size: Adult)   Pulse 58   Temp 98 3 °F (36 8 °C) (Temporal)   Ht 5' 1" (1 549 m)   Wt 51 3 kg (113 lb)   SpO2 98%   BMI 21 35 kg/m²   Body mass index is 21 35 kg/m²  Physical Exam  Constitutional:       Appearance: She is well-developed  She is not ill-appearing or diaphoretic  HENT:      Head: Normocephalic  Right Ear: External ear normal       Left Ear: External ear normal       Mouth/Throat:      Pharynx: No posterior oropharyngeal erythema  Eyes:      General: No scleral icterus  Pupils: Pupils are equal, round, and reactive to light  Neck:      Thyroid: No thyromegaly  Trachea: No tracheal deviation  Cardiovascular:      Rate and Rhythm: Normal rate and regular rhythm  Comments: 1+ bilateral lower extremity edema present  Fine bilateral rails noted  Pulmonary:      Effort: Pulmonary effort is normal  No respiratory distress  Breath sounds: Normal breath sounds  Chest:      Chest wall: No tenderness  Abdominal:      General: Bowel sounds are normal       Palpations: Abdomen is soft  There is no mass  Tenderness: There is no abdominal tenderness  Musculoskeletal:         General: Normal range of motion  Cervical back: Normal range of motion and neck supple  Right lower leg: Edema present  Left lower leg: Edema present  Lymphadenopathy:      Cervical: No cervical adenopathy  Skin:     General: Skin is warm  Neurological:      Mental Status: She is alert and oriented to person, place, and time  Cranial Nerves: No cranial nerve deficit  Psychiatric:         Mood and Affect: Mood normal          Behavior: Behavior normal          Thought Content:  Thought content normal

## 2023-01-31 NOTE — ASSESSMENT & PLAN NOTE
Lab Results   Component Value Date    EGFR 36 01/26/2023    EGFR 38 11/29/2022    EGFR 62 10/06/2022    CREATININE 1 28 01/26/2023    CREATININE 1 21 11/29/2022    CREATININE 0 82 10/06/2022   Renal function is stable    We will continue to monitor

## 2023-01-31 NOTE — ASSESSMENT & PLAN NOTE
Wt Readings from Last 3 Encounters:   01/31/23 51 3 kg (113 lb)   12/13/22 49 9 kg (110 lb)   11/30/22 49 9 kg (110 lb)   Blood pressure and weight at home are stable    Continue with present dose of diuretic regimen

## 2023-01-31 NOTE — ASSESSMENT & PLAN NOTE
Wt Readings from Last 3 Encounters:   01/31/23 51 3 kg (113 lb)   12/13/22 49 9 kg (110 lb)   11/30/22 49 9 kg (110 lb)       Stable on present regimen    Also being followed by cardiology

## 2023-02-16 DIAGNOSIS — E03.9 HYPOTHYROIDISM, UNSPECIFIED TYPE: ICD-10-CM

## 2023-02-16 RX ORDER — LEVOTHYROXINE SODIUM 0.03 MG/1
25 TABLET ORAL DAILY
Qty: 90 TABLET | Refills: 1 | Status: SHIPPED | OUTPATIENT
Start: 2023-02-16

## 2023-02-27 DIAGNOSIS — F05 SUNDOWNING: Primary | ICD-10-CM

## 2023-02-27 RX ORDER — GABAPENTIN 100 MG/1
100 CAPSULE ORAL
Qty: 90 CAPSULE | Refills: 0 | Status: SHIPPED | OUTPATIENT
Start: 2023-02-27

## 2023-03-13 ENCOUNTER — APPOINTMENT (OUTPATIENT)
Dept: LAB | Age: 88
End: 2023-03-13

## 2023-03-14 ENCOUNTER — TELEPHONE (OUTPATIENT)
Dept: OTHER | Facility: OTHER | Age: 88
End: 2023-03-14

## 2023-03-14 DIAGNOSIS — F05 SUNDOWNING: Primary | ICD-10-CM

## 2023-03-14 RX ORDER — OLANZAPINE 2.5 MG/1
2.5 TABLET ORAL AS NEEDED
Qty: 15 TABLET | Refills: 0 | Status: SHIPPED | OUTPATIENT
Start: 2023-03-14

## 2023-03-14 NOTE — TELEPHONE ENCOUNTER
Pt is having a change in mental status which is worsening and provider would like to discuss some things with on call         On call provider paged

## 2023-03-14 NOTE — TELEPHONE ENCOUNTER
Nurse called saying that patient daughter reported to her that the patient is being combative not eating or drinking

## 2023-03-23 NOTE — TELEPHONE ENCOUNTER
LMOM for pt's daughter Bety Sanchez to call me at [facility] office    Please schedule TCM on or before 4/5

## 2023-03-24 ENCOUNTER — TELEPHONE (OUTPATIENT)
Dept: INTERNAL MEDICINE CLINIC | Facility: OTHER | Age: 88
End: 2023-03-24

## 2023-03-24 DIAGNOSIS — F05 SUNDOWNING: ICD-10-CM

## 2023-03-24 RX ORDER — OLANZAPINE 2.5 MG/1
TABLET ORAL
Qty: 15 TABLET | Refills: 0 | OUTPATIENT
Start: 2023-03-24

## 2023-03-24 NOTE — TELEPHONE ENCOUNTER
Nitish Lebron from [address] [facility] called stating that patient has been in and out of the hospital and has been falling pretty frequently  Family is looking for hospice care and Nitish Leborn is asking if we can place an order for home hospice for eval and treatment of patient  They are looking for these services ASAP and with Dr Jason Lamas being out can another provider please advise       Nitish Lebron can be reached at [phone] and the order can be faxed to [phone]    Thank you

## 2023-03-24 NOTE — TELEPHONE ENCOUNTER
There was an issue with patient's olanzepine prescription after being discharged from the hospital  Family and pharmacy are asking for this to be 1x daily at bedtime to help with patient's sundowning symptoms  Family states they have tonight covered but do not have any for the weekend  This medication also requires a PA but family previously used goodrx and paid $11 which they will do again  Can a provider please review and advise with Dr Matthews Kingdom being out   Thank you

## 2023-03-27 ENCOUNTER — TELEMEDICINE (OUTPATIENT)
Dept: INTERNAL MEDICINE CLINIC | Facility: OTHER | Age: 88
End: 2023-03-27

## 2023-03-27 VITALS — BODY MASS INDEX: 21.34 KG/M2 | WEIGHT: 113 LBS | HEIGHT: 61 IN

## 2023-03-27 DIAGNOSIS — I11.0 HYPERTENSIVE HEART DISEASE WITH HEART FAILURE (HCC): ICD-10-CM

## 2023-03-27 DIAGNOSIS — E44.0 MODERATE PROTEIN-ENERGY MALNUTRITION (HCC): ICD-10-CM

## 2023-03-27 DIAGNOSIS — E03.9 HYPOTHYROIDISM, UNSPECIFIED TYPE: ICD-10-CM

## 2023-03-27 DIAGNOSIS — I10 ESSENTIAL HYPERTENSION: ICD-10-CM

## 2023-03-27 DIAGNOSIS — I50.32 CHRONIC DIASTOLIC (CONGESTIVE) HEART FAILURE (HCC): ICD-10-CM

## 2023-03-27 DIAGNOSIS — I50.30 DIASTOLIC CHF WITH PRESERVED LEFT VENTRICULAR FUNCTION, NYHA CLASS 2 (HCC): ICD-10-CM

## 2023-03-27 DIAGNOSIS — I73.89 OTHER SPECIFIED PERIPHERAL VASCULAR DISEASES (HCC): ICD-10-CM

## 2023-03-27 DIAGNOSIS — N18.31 STAGE 3A CHRONIC KIDNEY DISEASE (HCC): ICD-10-CM

## 2023-03-27 DIAGNOSIS — F05 SUNDOWNING: ICD-10-CM

## 2023-03-27 DIAGNOSIS — K21.9 GASTROESOPHAGEAL REFLUX DISEASE WITHOUT ESOPHAGITIS: Primary | ICD-10-CM

## 2023-03-27 DIAGNOSIS — C16.3: ICD-10-CM

## 2023-03-27 RX ORDER — OLANZAPINE 2.5 MG/1
2.5 TABLET ORAL AS NEEDED
Qty: 15 TABLET | Refills: 0 | Status: SHIPPED | OUTPATIENT
Start: 2023-03-27 | End: 2023-04-04 | Stop reason: SDUPTHER

## 2023-03-27 NOTE — ASSESSMENT & PLAN NOTE
Wt Readings from Last 3 Encounters:   03/27/23 51 3 kg (113 lb)   01/31/23 51 3 kg (113 lb)   12/13/22 49 9 kg (110 lb)     Stable  Continue with present regimen    Also being followed by cardiologist

## 2023-03-27 NOTE — PROGRESS NOTES
Virtual TCM Visit:    Verification of patient location:    Patient is located in the following state in which I hold an active license [place]    Assessment/Plan:        Problem List Items Addressed This Visit        Digestive    Gastroesophageal reflux disease - Primary     Continue with present dose of omeprazole         Relevant Orders    CBC    Primary malignant neoplasm of pyloric antrum ([facility] )     In remission            Endocrine    Hypothyroidism     Continue with present dose of levothyroxine  We will check thyroid function test before next visit         Relevant Orders    TSH, 3rd generation with Free T4 reflex       Cardiovascular and Mediastinum    Essential hypertension     Today blood pressure is 116/49, pulse 54 and room air oxygen saturation is 94%  Continue with present regimen         Diastolic CHF with preserved left ventricular function, NYHA class 2 (HCC)     Wt Readings from Last 3 Encounters:   03/27/23 51 3 kg (113 lb)   01/31/23 51 3 kg (113 lb)   12/13/22 49 9 kg (110 lb)       Stable on present regimen  Other specified peripheral vascular diseases ([facility] )    Chronic diastolic (congestive) heart failure (HCC)     Wt Readings from Last 3 Encounters:   03/27/23 51 3 kg (113 lb)   01/31/23 51 3 kg (113 lb)   12/13/22 49 9 kg (110 lb)       Stable on present regimen  As per daughter if patient's condition deteriorates she would prefer hospice care  Hypertensive heart disease with heart failure ([facility] )     Wt Readings from Last 3 Encounters:   03/27/23 51 3 kg (113 lb)   01/31/23 51 3 kg (113 lb)   12/13/22 49 9 kg (110 lb)     Stable  Continue with present regimen    Also being followed by cardiologist                Genitourinary    Stage 3a chronic kidney disease [facility])     Lab Results   Component Value Date    EGFR 36 01/26/2023    EGFR 38 11/29/2022    EGFR 62 10/06/2022    CREATININE 1 28 01/26/2023    CREATININE 1 21 11/29/2022    CREATININE 0 82 10/06/2022   Renal function is stable  We will continue to monitor         Relevant Orders    Comprehensive metabolic panel       Other    Moderate protein-energy malnutrition ([address] )     Continue with protein supplement                                BMI Findings: Body mass index is 21 35 kg/m²  Other Visit Diagnoses     Sundowning        Relevant Medications    OLANZapine (ZyPREXA) 2 5 mg tablet           Reason for visit is TCM    Encounter provider Ricarda Zapien MD     Provider located at [address] [place] [id number]    Recent Visits  Date Type Provider Dept   03/24/23 Telephone Mone Marr RN  [employer] [facility]   03/23/23 Telephone [address]  [facility] [place]   Showing recent visits within past 7 days and meeting all other requirements  Today's Visits  Date Type Provider Dept   03/27/23 Telemedicine Ricarda Zapien MD  [email or web address]   Showing today's visits and meeting all other requirements  Future Appointments  No visits were found meeting these conditions  Showing future appointments within next 150 days and meeting all other requirements       After connecting through televideo, the patient was identified by name and date of birth  Ivonne Aguirre was informed that this is a telemedicine visit and that the visit is being conducted through the [address] [place] [address] Now platform  She agrees to proceed     My office door was closed  No one else was in the room  She acknowledged consent and understanding of privacy and security of the video platform  The patient has agreed to participate and understands they can discontinue the visit at any time  Patient is aware this is a billable service  Transitional Care Management Review: Ivonne Aguirre is a 80 y o  female here for TCM follow up       During the TCM phone call patient stated:    TCM Call     Date and time call was made  3/23/2023 12:31 PM    Hospital care reviewed  Records reviewed    Patient was hospitialized at  [facility]  [address] [place] [address] [address] [facility]    Date of Admission  03/15/23    Date of discharge  03/22/23    Diagnosis  altered mental status, generalized weakness, decreased oral intake    Disposition  Home    Were the patients medications reviewed and updated  Yes    Current Symptoms  Fatigue; Cough; Constipation  not eating much    Cough Severity  Mild    Loose stool severity  Mild    Shortness of breath severity  Mild    Weakness severity  Mild    Fatigue severity  Severe      TCM Call     Post hospital issues  Reduced activity    Should patient be enrolled in anticoag monitoring? Yes    Scheduled for follow up? Yes    Not clinically warranted   inpatient rehab @ [facility]    Patients specialists  Other (comment)    Other specialists names  Alok Ghotra MD 9/19/19 [facility] orthopedics    Other specialists contcat #  [phone]    Did you obtain your prescribed medications  Yes    Do you need help managing your prescriptions or medications  Yes    Why type of assitance do you need  daughter    Is transportation to your appointment needed  No    I have advised the patient to call PCP with any new or worsening symptoms  Ketan Saul [facility]        Subjective:     Patient ID: Garcia Loja is a 80 y o  female  Patient was admitted to [facility] with delirium  She was treated with Zyprexa and responded well  Now she is home on Zyprexa  And comfortable    Review of Systems   Constitutional: Positive for fatigue  Negative for fever  HENT: Negative for congestion, ear discharge, ear pain, postnasal drip, sinus pressure, sore throat, tinnitus and trouble swallowing  Eyes: Negative for discharge, itching and visual disturbance  Respiratory: Negative for cough and shortness of breath      Cardiovascular: Negative for chest pain "and palpitations  Gastrointestinal: Negative for abdominal pain, diarrhea, nausea and vomiting  Endocrine: Negative for cold intolerance and polyuria  Genitourinary: Negative for difficulty urinating, dysuria and urgency  Musculoskeletal: Negative for arthralgias and neck pain  Skin: Negative for rash  Allergic/Immunologic: Negative for environmental allergies  Neurological: Negative for dizziness, weakness and headaches  Psychiatric/Behavioral: Positive for agitation and behavioral problems  The patient is not nervous/anxious  Objective:    Vitals:    03/27/23 1357   Weight: 51 3 kg (113 lb)   Height: 5' 1\" (1 549 m)       Physical Exam  Constitutional:       Appearance: She is ill-appearing  HENT:      Right Ear: External ear normal       Left Ear: External ear normal       Nose: No rhinorrhea  Mouth/Throat:      Pharynx: No posterior oropharyngeal erythema  Eyes:      Conjunctiva/sclera: Conjunctivae normal    Pulmonary:      Effort: No respiratory distress  Abdominal:      General: There is no distension  Musculoskeletal:      Right lower leg: No edema  Left lower leg: No edema  Neurological:      Mental Status: She is disoriented  Psychiatric:      Comments: At present patient appears comfortable         Medications have been reviewed by provider in current encounter    I spent 20 minutes with the patient during this visit  Irasema Henry MD      VIRTUAL VISIT DISCLAIMER    Jono Bennett verbally agrees to participate in [employer]  Pt is aware that [employer] could be limited without vital signs or the ability to perform a full hands-on physical Evelina Rodrigez understands she or the provider may request at any time to terminate the video visit and request the patient to seek care or treatment in person        "

## 2023-03-27 NOTE — ASSESSMENT & PLAN NOTE
Wt Readings from Last 3 Encounters:   03/27/23 51 3 kg (113 lb)   01/31/23 51 3 kg (113 lb)   12/13/22 49 9 kg (110 lb)       Stable on present regimen  As per daughter if patient's condition deteriorates she would prefer hospice care

## 2023-03-27 NOTE — ASSESSMENT & PLAN NOTE
Today blood pressure is 116/49, pulse 54 and room air oxygen saturation is 94%    Continue with present regimen

## 2023-03-27 NOTE — ASSESSMENT & PLAN NOTE
Wt Readings from Last 3 Encounters:   03/27/23 51 3 kg (113 lb)   01/31/23 51 3 kg (113 lb)   12/13/22 49 9 kg (110 lb)       Stable on present regimen

## 2023-03-27 NOTE — ASSESSMENT & PLAN NOTE
Continue with protein supplement                                BMI Findings: Body mass index is 21 35 kg/m²

## 2023-03-28 ENCOUNTER — TELEPHONE (OUTPATIENT)
Dept: INTERNAL MEDICINE CLINIC | Facility: OTHER | Age: 88
End: 2023-03-28

## 2023-03-28 NOTE — TELEPHONE ENCOUNTER
Ana Laura Carter from [facility] calling to make sure it was ok to call to set up home care because they had called a few weeks ago and daughter said that they were doing hospice care  I read her your note and she asked for the office visit notes and said she would process the order and give daughter a call   Faxed the note to [phone]

## 2023-03-28 NOTE — TELEPHONE ENCOUNTER
Called and spoke to daughter  Informed her that [facility], [facility] and a hospital bed via [employer] through [facility] was ordered  Faxed [facility] orders and facesheet to [phone]  Cristi Malik to call me if she did not hear from [facility] or [facility] about a delivery date for bed

## 2023-04-04 ENCOUNTER — OFFICE VISIT (OUTPATIENT)
Dept: INTERNAL MEDICINE CLINIC | Facility: OTHER | Age: 88
End: 2023-04-04

## 2023-04-04 VITALS
WEIGHT: 114 LBS | BODY MASS INDEX: 21.52 KG/M2 | HEIGHT: 61 IN | DIASTOLIC BLOOD PRESSURE: 58 MMHG | HEART RATE: 61 BPM | OXYGEN SATURATION: 95 % | SYSTOLIC BLOOD PRESSURE: 124 MMHG | TEMPERATURE: 98 F

## 2023-04-04 DIAGNOSIS — K21.9 GASTROESOPHAGEAL REFLUX DISEASE WITHOUT ESOPHAGITIS: Primary | ICD-10-CM

## 2023-04-04 DIAGNOSIS — E03.9 HYPOTHYROIDISM, UNSPECIFIED TYPE: ICD-10-CM

## 2023-04-04 DIAGNOSIS — I50.30 DIASTOLIC CHF WITH PRESERVED LEFT VENTRICULAR FUNCTION, NYHA CLASS 2 (HCC): ICD-10-CM

## 2023-04-04 DIAGNOSIS — F05 SUNDOWNING: ICD-10-CM

## 2023-04-04 DIAGNOSIS — D62 ACUTE BLOOD LOSS ANEMIA: ICD-10-CM

## 2023-04-04 DIAGNOSIS — I10 ESSENTIAL HYPERTENSION: ICD-10-CM

## 2023-04-04 DIAGNOSIS — C16.3: ICD-10-CM

## 2023-04-04 DIAGNOSIS — I11.0 HYPERTENSIVE HEART DISEASE WITH HEART FAILURE (HCC): ICD-10-CM

## 2023-04-04 DIAGNOSIS — I50.32 CHRONIC DIASTOLIC (CONGESTIVE) HEART FAILURE (HCC): ICD-10-CM

## 2023-04-04 DIAGNOSIS — E44.0 MODERATE PROTEIN-ENERGY MALNUTRITION (HCC): ICD-10-CM

## 2023-04-04 DIAGNOSIS — I73.89 OTHER SPECIFIED PERIPHERAL VASCULAR DISEASES (HCC): ICD-10-CM

## 2023-04-04 DIAGNOSIS — F03.911 AGITATION DUE TO DEMENTIA (HCC): ICD-10-CM

## 2023-04-04 DIAGNOSIS — N18.31 STAGE 3A CHRONIC KIDNEY DISEASE (HCC): ICD-10-CM

## 2023-04-04 RX ORDER — LOSARTAN POTASSIUM 25 MG/1
25 TABLET ORAL DAILY
Qty: 90 TABLET | Refills: 1 | Status: SHIPPED | OUTPATIENT
Start: 2023-04-04

## 2023-04-04 RX ORDER — FUROSEMIDE 40 MG/1
40 TABLET ORAL DAILY
Qty: 90 TABLET | Refills: 1 | Status: SHIPPED | OUTPATIENT
Start: 2023-04-04 | End: 2023-05-04

## 2023-04-04 RX ORDER — OLANZAPINE 5 MG/1
2.5 TABLET ORAL 2 TIMES DAILY
Qty: 30 TABLET | Refills: 0 | Status: SHIPPED | OUTPATIENT
Start: 2023-04-04

## 2023-04-04 RX ORDER — OLANZAPINE 5 MG/1
TABLET, ORALLY DISINTEGRATING ORAL
COMMUNITY
Start: 2023-03-27

## 2023-04-04 NOTE — ASSESSMENT & PLAN NOTE
Lab Results   Component Value Date    EGFR 36 01/26/2023    EGFR 38 11/29/2022    EGFR 62 10/06/2022    CREATININE 1 28 01/26/2023    CREATININE 1 21 11/29/2022    CREATININE 0 82 10/06/2022   Renal function is stable  We will continue with present regimen

## 2023-04-04 NOTE — ASSESSMENT & PLAN NOTE
Wt Readings from Last 3 Encounters:   04/04/23 51 7 kg (114 lb)   03/27/23 51 3 kg (113 lb)   01/31/23 51 3 kg (113 lb)     Fairly compensated  Lower extremity edema is worsening secondary to hanging the legs most of the time does not use recliner as suggested  Continue with compression stocking

## 2023-04-04 NOTE — ASSESSMENT & PLAN NOTE
Thyroid function test reviewed  TSH is elevated  Presently she is taking her levothyroxine 25 mcg every day except Sunday 50 mcg  Advised to take 50 mcg twice a week like Monday and Friday the rest of the days 25 mcg daily

## 2023-04-04 NOTE — PROGRESS NOTES
Assessment/Plan:    Gastroesophageal reflux disease  Continue with present regimen  Primary malignant neoplasm of pyloric antrum (HCC)  In remission    Hypothyroidism  Thyroid function test reviewed  TSH is elevated  Presently she is taking her levothyroxine 25 mcg every day except Sunday 50 mcg  Advised to take 50 mcg twice a week like Monday and Friday the rest of the days 25 mcg daily  Diastolic CHF with preserved left ventricular function, NYHA class 2 (HCC)  Wt Readings from Last 3 Encounters:   04/04/23 51 7 kg (114 lb)   03/27/23 51 3 kg (113 lb)   01/31/23 51 3 kg (113 lb)     Fairly compensated  Lower extremity edema is worsening secondary to hanging the legs most of the time does not use recliner as suggested  Continue with compression stocking  Stage 3a chronic kidney disease [facility])  Lab Results   Component Value Date    EGFR 36 01/26/2023    EGFR 38 11/29/2022    EGFR 62 10/06/2022    CREATININE 1 28 01/26/2023    CREATININE 1 21 11/29/2022    CREATININE 0 82 10/06/2022   Renal function is stable  We will continue with present regimen  Agitation due to dementia [facility])  Presently she is on Zyprexa 5 mg p o  twice daily still ineffective and agitated  During hospitalization she was seen by psychiatrist and a neurologist also  Discussed with daughter regarding hospice care and she is in agreement  She will contact [facility] and will be managed by their hospice director  Diagnoses and all orders for this visit:    Gastroesophageal reflux disease without esophagitis    Essential hypertension  -     furosemide (LASIX) 40 mg tablet; Take 1 tablet (40 mg total) by mouth daily  -     losartan (COZAAR) 25 mg tablet; Take 1 tablet (25 mg total) by mouth daily    Sundowning  -     OLANZapine (ZyPREXA) 5 mg tablet;  Take 0 5 tablets (2 5 mg total) by mouth 2 (two) times a day    Acute blood loss anemia    Primary malignant neoplasm of pyloric antrum ([facility] )    Hypothyroidism, unspecified type    Diastolic CHF with preserved left ventricular function, NYHA class 2 ([facility] )    Other specified peripheral vascular diseases ([facility] )    Chronic diastolic (congestive) heart failure (HCC)    Hypertensive heart disease with heart failure (HCC)    Stage 3a chronic kidney disease (HCC)    Moderate protein-energy malnutrition (HCC)    Agitation due to dementia ([facility] )    Other orders  -     OLANZapine (ZyPREXA ZYDIS) 5 mg dispersible tablet; DISSOLVE ONE HALF TABLET IN CHEEK NIGHTLY               Subjective:          Patient ID: Liza Rausch is a 80 y o  female  Patient is here for follow-up  Couple of weeks ago she was admitted to the hospital with agitation and congestive heart failure  Still she is having sundowning and most of the time agitated with poor sleep  The following portions of the patient's history were reviewed and updated as appropriate: allergies, current medications, past family history, past medical history, past social history, past surgical history and problem list     Review of Systems   Constitutional: Negative for fatigue and fever  HENT: Negative for congestion, ear discharge, ear pain, postnasal drip, sinus pressure, sore throat, tinnitus and trouble swallowing  Eyes: Negative for discharge, itching and visual disturbance  Respiratory: Positive for shortness of breath  Negative for cough  Cardiovascular: Positive for leg swelling  Negative for chest pain and palpitations  Gastrointestinal: Negative for abdominal pain, diarrhea, nausea and vomiting  Endocrine: Negative for cold intolerance and polyuria  Genitourinary: Negative for difficulty urinating, dysuria and urgency  Musculoskeletal: Negative for arthralgias and neck pain  Skin: Negative for rash  Allergic/Immunologic: Negative for environmental allergies  Neurological: Negative for dizziness, weakness and headaches     Psychiatric/Behavioral: Positive for agitation and confusion  The patient is not nervous/anxious  Past Medical History:   Diagnosis Date   • Allergic    • Anemia    • Arthritis    • Cancer [facility])    • 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[facility] )    • Depression 7/15/2020   • Disease of thyroid gland    • Fatigue     last assessed 8/5/15; resolved 9/30/16   • Gastric cancer ([facility] ) 2013   • GERD (gastroesophageal reflux disease)    • Hyperlipidemia    • Hypertension    • Osteoporosis    • Senile cataract     last assessed 10/18/13; resolved 2/6/15   • Situational depression     last assessed 2/11/15; resolved 7/20/15   • Visual impairment          Current Outpatient Medications:   •  acetaminophen (TYLENOL) 500 mg tablet, Take 1 tablet by mouth if needed, Disp: , Rfl:   •  BABY ASPIRIN PO, Take 81 mg by mouth in the morning, Disp: , Rfl:   •  bisoprolol (ZEBETA) 5 mg tablet, Take 1 tablet (5 mg total) by mouth daily Hold for heart rate < 50/min , Disp: 90 tablet, Rfl: 1  •  calcium carbonate (TUMS EX) 750 mg chewable tablet, Chew 750 mg Three times daily as needed, Disp: , Rfl:   •  carboxymethylcellulose 0 5 % SOLN, Apply 1 drop to eye every other day , Disp: , Rfl:   •  Cholecalciferol (VITAMIN D3) 1000 units CAPS, Take 1 tablet by mouth daily, Disp: , Rfl:   •  ciprofloxacin (CILOXAN) 0 3 % ophthalmic solution, Administer 1 drop into the left eye every other day  , Disp: , Rfl:   •  clotrimazole-betamethasone (LOTRISONE) 1-0 05 % cream, Apply 1 application   topically in the morning To affected area, Disp: , Rfl:   •  erythromycin (ILOTYCIN) ophthalmic ointment, APPLY TO BOTH EYES AT BEDTIME, Disp: , Rfl:   •  ferrous sulfate (FeroSul) 325 (65 Fe) mg tablet, Take 1 tablet (325 mg total) by mouth 2 (two) times a day with meals (Patient taking differently: Take 325 mg by mouth daily with breakfast), Disp: , Rfl:   •  furosemide (LASIX) 20 mg tablet, Take 20 mg by mouth if needed (edema) Total 60 mg daily as per cardiology  per daughter pt takes 40 mg daily and an extra 20 mg daily as needed for swelling , Disp: , Rfl:   •  furosemide (LASIX) 40 mg tablet, Take 1 tablet (40 mg total) by mouth daily, Disp: 90 tablet, Rfl: 1  •  levothyroxine 25 mcg tablet, Take 1 tablet (25 mcg total) by mouth daily, Disp: 90 tablet, Rfl: 1  •  losartan (COZAAR) 25 mg tablet, Take 1 tablet (25 mg total) by mouth daily, Disp: 90 tablet, Rfl: 1  •  mirtazapine (REMERON) 7 5 MG tablet, Take 1 tablet (7 5 mg total) by mouth daily at bedtime, Disp: 90 tablet, Rfl: 1  •  Multiple Vitamins-Minerals (OCUVITE PRESERVISION PO), Take 1 tablet by mouth daily, Disp: , Rfl:   •  OLANZapine (ZyPREXA ZYDIS) 5 mg dispersible tablet, DISSOLVE ONE HALF TABLET IN CHEEK NIGHTLY, Disp: , Rfl:   •  OLANZapine (ZyPREXA) 5 mg tablet, Take 0 5 tablets (2 5 mg total) by mouth 2 (two) times a day, Disp: 30 tablet, Rfl: 0  •  omeprazole (PriLOSEC) 40 MG capsule, Take 1 capsule (40 mg total) by mouth daily, Disp: 90 capsule, Rfl: 1  •  polyethylene glycol-propylene glycol (SYSTANE) 0 4-0 3 %, Administer 2 drops to both eyes as needed  , Disp: , Rfl:   •  potassium chloride (MICRO-K) 10 MEQ CR capsule, Take 2 capsules (20 mEq total) by mouth 3 (three) times a day, Disp: 540 capsule, Rfl: 0  •  prednisoLONE acetate (PRED FORTE) 1 % ophthalmic suspension, Administer 1 drop into the left eye every other day  , Disp: , Rfl:   •  pyridoxine (VITAMIN B6) 100 mg tablet, Take 100 mg by mouth daily  , Disp: , Rfl:   •  zinc oxide 20 % ointment, Apply topically 2 (two) times a day Or as needed, Disp: 85 g, Rfl: 2    Allergies   Allergen Reactions   • Gabapentin Hallucinations   • Latex Rash   • Oxycodone Nausea Only, Hallucinations and Other (See Comments)     Hallucinations  Hallucinations  Other reaction(s): Hallucinations  Hallucinations  Hallucinations  Other reaction(s): Hallucinations  Hallucinations  Other reaction(s): Hallucinations, Other (See Comments)  Hallucinations  Hallucinations  Other reaction(s): Hallucinations  Hallucinations "  • Oxycodone-Acetaminophen Nausea Only and Hallucinations   • Pollen Extract Allergic Rhinitis       Social History   Past Surgical History:   Procedure Laterality Date   • COLONOSCOPY     • DENTAL SURGERY     • EYE SURGERY     • GASTRIC RESTRICTION SURGERY      for cancer   • JOINT REPLACEMENT     • KNEE SURGERY Right 01/2017    TKR   • LYMPH NODE BIOPSY     • STOMACH SURGERY       Family History   Problem Relation Age of Onset   • Heart attack Mother    • Diabetes Mother    • Heart disease Mother    • Prostate cancer Father        Objective:  /58 (BP Location: Left arm, Patient Position: Sitting, Cuff Size: Adult)   Pulse 61   Temp 98 °F (36 7 °C) (Temporal)   Ht 5' 1\" (1 549 m)   Wt 51 7 kg (114 lb)   SpO2 95%   BMI 21 54 kg/m²   Body mass index is 21 54 kg/m²  Physical Exam  Constitutional:       Appearance: She is well-developed  HENT:      Head: Normocephalic  Right Ear: Ear canal and external ear normal  There is no impacted cerumen  Left Ear: Ear canal and external ear normal  There is no impacted cerumen  Nose: No rhinorrhea  Mouth/Throat:      Pharynx: No posterior oropharyngeal erythema  Eyes:      General: No scleral icterus  Pupils: Pupils are equal, round, and reactive to light  Neck:      Thyroid: No thyromegaly  Trachea: No tracheal deviation  Cardiovascular:      Rate and Rhythm: Normal rate  Rhythm irregular  Heart sounds: Murmur heard  Comments: 2+ bilateral lower extremity edema present  Pulmonary:      Effort: Pulmonary effort is normal  No respiratory distress  Breath sounds: Normal breath sounds  Chest:      Chest wall: No tenderness  Abdominal:      General: Bowel sounds are normal       Palpations: Abdomen is soft  There is no mass  Tenderness: There is no abdominal tenderness  Musculoskeletal:         General: Normal range of motion  Cervical back: Normal range of motion and neck supple        Right lower " leg: Edema present  Left lower leg: Edema present  Lymphadenopathy:      Cervical: No cervical adenopathy  Skin:     General: Skin is warm  Neurological:      Mental Status: She is alert  She is disoriented  Cranial Nerves: No cranial nerve deficit

## 2023-04-04 NOTE — ASSESSMENT & PLAN NOTE
Presently she is on Zyprexa 5 mg p o  twice daily still ineffective and agitated  During hospitalization she was seen by psychiatrist and a neurologist also  Discussed with daughter regarding hospice care and she is in agreement  She will contact [facility] and will be managed by their hospice director

## 2023-04-06 NOTE — TELEPHONE ENCOUNTER
Qasim Corea from [address] [facility] wanted you to know pt was officially admitted to hospice services and [facility] home care ended yesterday. Diann's call back number is  [phone]. Cathy David

## 2023-06-07 ENCOUNTER — OFFICE VISIT (OUTPATIENT)
Dept: INTERNAL MEDICINE CLINIC | Facility: OTHER | Age: 88
End: 2023-06-07
Payer: COMMERCIAL

## 2023-06-07 ENCOUNTER — APPOINTMENT (OUTPATIENT)
Dept: LAB | Facility: IMAGING CENTER | Age: 88
End: 2023-06-07
Payer: COMMERCIAL

## 2023-06-07 VITALS
WEIGHT: 102 LBS | BODY MASS INDEX: 19.26 KG/M2 | HEIGHT: 61 IN | SYSTOLIC BLOOD PRESSURE: 108 MMHG | OXYGEN SATURATION: 92 % | HEART RATE: 59 BPM | DIASTOLIC BLOOD PRESSURE: 54 MMHG | RESPIRATION RATE: 18 BRPM | TEMPERATURE: 97.5 F

## 2023-06-07 DIAGNOSIS — I10 ESSENTIAL HYPERTENSION: ICD-10-CM

## 2023-06-07 DIAGNOSIS — K21.9 GASTROESOPHAGEAL REFLUX DISEASE WITHOUT ESOPHAGITIS: ICD-10-CM

## 2023-06-07 DIAGNOSIS — I50.30 DIASTOLIC CHF WITH PRESERVED LEFT VENTRICULAR FUNCTION, NYHA CLASS 2 (HCC): ICD-10-CM

## 2023-06-07 DIAGNOSIS — I50.32 CHRONIC DIASTOLIC (CONGESTIVE) HEART FAILURE (HCC): ICD-10-CM

## 2023-06-07 DIAGNOSIS — N18.31 STAGE 3A CHRONIC KIDNEY DISEASE (HCC): ICD-10-CM

## 2023-06-07 DIAGNOSIS — F03.911 AGITATION DUE TO DEMENTIA (HCC): ICD-10-CM

## 2023-06-07 DIAGNOSIS — F33.9 DEPRESSION, RECURRENT (HCC): ICD-10-CM

## 2023-06-07 DIAGNOSIS — Z85.028 HISTORY OF GASTRIC CANCER: ICD-10-CM

## 2023-06-07 DIAGNOSIS — C16.3: ICD-10-CM

## 2023-06-07 DIAGNOSIS — K21.9 GASTROESOPHAGEAL REFLUX DISEASE WITHOUT ESOPHAGITIS: Primary | ICD-10-CM

## 2023-06-07 DIAGNOSIS — E03.9 HYPOTHYROIDISM, UNSPECIFIED TYPE: ICD-10-CM

## 2023-06-07 DIAGNOSIS — I11.0 HYPERTENSIVE HEART DISEASE WITH HEART FAILURE (HCC): ICD-10-CM

## 2023-06-07 DIAGNOSIS — I73.89 OTHER SPECIFIED PERIPHERAL VASCULAR DISEASES (HCC): ICD-10-CM

## 2023-06-07 LAB
ALBUMIN SERPL BCP-MCNC: 2.8 G/DL (ref 3.5–5)
ALP SERPL-CCNC: 88 U/L (ref 46–116)
ALT SERPL W P-5'-P-CCNC: 15 U/L (ref 12–78)
ANION GAP SERPL CALCULATED.3IONS-SCNC: 1 MMOL/L (ref 4–13)
AST SERPL W P-5'-P-CCNC: 21 U/L (ref 5–45)
BILIRUB SERPL-MCNC: 0.26 MG/DL (ref 0.2–1)
BUN SERPL-MCNC: 33 MG/DL (ref 5–25)
CALCIUM ALBUM COR SERPL-MCNC: 9.8 MG/DL (ref 8.3–10.1)
CALCIUM SERPL-MCNC: 8.8 MG/DL (ref 8.3–10.1)
CHLORIDE SERPL-SCNC: 100 MMOL/L (ref 96–108)
CO2 SERPL-SCNC: 30 MMOL/L (ref 21–32)
CREAT SERPL-MCNC: 1.38 MG/DL (ref 0.6–1.3)
ERYTHROCYTE [DISTWIDTH] IN BLOOD BY AUTOMATED COUNT: 14.6 % (ref 11.6–15.1)
GFR SERPL CREATININE-BSD FRML MDRD: 33 ML/MIN/1.73SQ M
GLUCOSE SERPL-MCNC: 98 MG/DL (ref 65–140)
HCT VFR BLD AUTO: 33.3 % (ref 34.8–46.1)
HGB BLD-MCNC: 10.2 G/DL (ref 11.5–15.4)
MCH RBC QN AUTO: 27.1 PG (ref 26.8–34.3)
MCHC RBC AUTO-ENTMCNC: 30.6 G/DL (ref 31.4–37.4)
MCV RBC AUTO: 89 FL (ref 82–98)
PLATELET # BLD AUTO: 234 THOUSANDS/UL (ref 149–390)
PMV BLD AUTO: 9.4 FL (ref 8.9–12.7)
POTASSIUM SERPL-SCNC: 4.8 MMOL/L (ref 3.5–5.3)
PROT SERPL-MCNC: 7.2 G/DL (ref 6.4–8.4)
RBC # BLD AUTO: 3.76 MILLION/UL (ref 3.81–5.12)
SODIUM SERPL-SCNC: 131 MMOL/L (ref 135–147)
T4 FREE SERPL-MCNC: 0.74 NG/DL (ref 0.61–1.12)
TSH SERPL DL<=0.05 MIU/L-ACNC: 5.05 UIU/ML (ref 0.45–4.5)
WBC # BLD AUTO: 5.1 THOUSAND/UL (ref 4.31–10.16)

## 2023-06-07 PROCEDURE — 84443 ASSAY THYROID STIM HORMONE: CPT

## 2023-06-07 PROCEDURE — 84439 ASSAY OF FREE THYROXINE: CPT

## 2023-06-07 PROCEDURE — 99214 OFFICE O/P EST MOD 30 MIN: CPT | Performed by: INTERNAL MEDICINE

## 2023-06-07 PROCEDURE — 85027 COMPLETE CBC AUTOMATED: CPT

## 2023-06-07 PROCEDURE — 36415 COLL VENOUS BLD VENIPUNCTURE: CPT

## 2023-06-07 PROCEDURE — 80053 COMPREHEN METABOLIC PANEL: CPT

## 2023-06-07 RX ORDER — LORAZEPAM 1 MG/1
0.5 TABLET ORAL
COMMUNITY

## 2023-06-07 RX ORDER — QUETIAPINE FUMARATE 100 MG/1
50 TABLET, FILM COATED ORAL 2 TIMES DAILY
COMMUNITY

## 2023-06-07 RX ORDER — OMEPRAZOLE 40 MG/1
40 CAPSULE, DELAYED RELEASE ORAL DAILY
Qty: 90 CAPSULE | Refills: 1 | Status: SHIPPED | OUTPATIENT
Start: 2023-06-07

## 2023-06-07 RX ORDER — BISOPROLOL FUMARATE 5 MG/1
5 TABLET, FILM COATED ORAL DAILY
Qty: 90 TABLET | Refills: 1 | Status: SHIPPED | OUTPATIENT
Start: 2023-06-07

## 2023-06-07 NOTE — PROGRESS NOTES
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    Hypothyroidism  Continue with present dose of levothyroxine  We will check TSH today    Diastolic CHF with preserved left ventricular function, NYHA class 2 (HCC)  Wt Readings from Last 3 Encounters:   06/07/23 46 3 kg (102 lb)   04/04/23 51 7 kg (114 lb)   03/27/23 51 3 kg (113 lb)       Stable on present regimen of diuretics and beta-blocker  We will continue to monitor  We will discontinue hospice care at present    Patient's daughter request   We can restart if need arises  Other specified peripheral vascular diseases ([facility] )  Stable  No new complaints    Stage 3a chronic kidney disease (HCC)  Lab Results   Component Value Date    CREATININE 1 28 01/26/2023    CREATININE 1 21 11/29/2022    CREATININE 0 82 10/06/2022    EGFR 36 01/26/2023    EGFR 38 11/29/2022    EGFR 62 10/06/2022   Stable  We will check renal function today  Continue with present regimen    Depression, recurrent (HCC)  Stable on present regimen  Agitation due to dementia [facility])  Doing well on present regimen  Diagnoses and all orders for this visit:    Gastroesophageal reflux disease without esophagitis  -     CBC; Future    History of gastric cancer  -     omeprazole (PriLOSEC) 40 MG capsule; Take 1 capsule (40 mg total) by mouth daily    Essential hypertension  -     bisoprolol (ZEBETA) 5 mg tablet; Take 1 tablet (5 mg total) by mouth daily Hold for heart rate < 50/min  Primary malignant neoplasm of pyloric antrum (HCC)    Hypothyroidism, unspecified type  -     TSH, 3rd generation with Free T4 reflex; Future    Diastolic CHF with preserved left ventricular function, NYHA class 2 (HCC)  -     Comprehensive metabolic panel;  Future    Other specified peripheral vascular diseases (HCC)    Chronic diastolic (congestive) heart failure [facility])    Hypertensive heart disease with heart failure ([facility] )  -     Comprehensive metabolic panel; Future    Depression, recurrent (HCC)    Agitation due to dementia ([facility] )    Other orders  -     LORazepam (ATIVAN) 1 mg tablet; Take 0 5 mg by mouth daily at bedtime  -     QUEtiapine (SEROquel) 100 mg tablet; Take 50 mg by mouth 2 (two) times a day               Subjective:          Patient ID: Adolfo Bobby is a 80 y o  female  This is a follow-up visit  Presently patient is on hospice care  As per daughter patient is doing better and they would like to consider discontinuing hospice  Patient is comfortable pleasantly confused denied chest pain shortness of breath at rest      The following portions of the patient's history were reviewed and updated as appropriate: allergies, current medications, past family history, past medical history, past social history, past surgical history and problem list     Review of Systems   Constitutional: Negative for fatigue and fever  HENT: Negative for congestion, ear discharge, ear pain, postnasal drip, sinus pressure, sore throat, tinnitus and trouble swallowing  Eyes: Negative for discharge, itching and visual disturbance  Respiratory: Negative for cough and shortness of breath  Cardiovascular: Negative for chest pain and palpitations  Gastrointestinal: Negative for abdominal pain, diarrhea, nausea and vomiting  Endocrine: Negative for cold intolerance and polyuria  Genitourinary: Negative for difficulty urinating, dysuria and urgency  Musculoskeletal: Positive for arthralgias and gait problem  Negative for neck pain  Skin: Negative for rash  Allergic/Immunologic: Negative for environmental allergies  Neurological: Negative for dizziness, weakness and headaches  Psychiatric/Behavioral: Negative for agitation and behavioral problems  The patient is not nervous/anxious            Past Medical History:   Diagnosis Date   • Allergic    • Anemia    • Arthritis    • Cancer [facility])    • 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[facility] )    • Dementia ([facility] )    • Depression 07/15/2020   • Disease of thyroid gland    • Fatigue     last assessed 8/5/15; resolved 9/30/16   • Gastric cancer ([facility] ) 2013   • GERD (gastroesophageal reflux disease)    • Hyperlipidemia    • Hypertension    • Osteoporosis    • Senile cataract     last assessed 10/18/13; resolved 2/6/15   • Situational depression     last assessed 2/11/15; resolved 7/20/15   • Sundowning    • Visual impairment          Current Outpatient Medications:   •  acetaminophen (TYLENOL) 500 mg tablet, Take 1 tablet by mouth if needed, Disp: , Rfl:   •  bisoprolol (ZEBETA) 5 mg tablet, Take 1 tablet (5 mg total) by mouth daily Hold for heart rate < 50/min , Disp: 90 tablet, Rfl: 1  •  carboxymethylcellulose 0 5 % SOLN, Apply 1 drop to eye every other day , Disp: , Rfl:   •  Cholecalciferol (VITAMIN D3) 1000 units CAPS, Take 1 tablet by mouth daily, Disp: , Rfl:   •  ciprofloxacin (CILOXAN) 0 3 % ophthalmic solution, Administer 1 drop into the left eye every other day  , Disp: , Rfl:   •  clotrimazole-betamethasone (LOTRISONE) 1-0 05 % cream, Apply 1 application   topically if needed To affected area, Disp: , Rfl:   •  ferrous sulfate (FeroSul) 325 (65 Fe) mg tablet, Take 1 tablet (325 mg total) by mouth 2 (two) times a day with meals (Patient taking differently: Take 325 mg by mouth daily with breakfast), Disp: , Rfl:   •  furosemide (LASIX) 20 mg tablet, Take 20 mg by mouth if needed (edema) Total 60 mg daily as per cardiology  per daughter pt takes 40 mg daily and an extra 20 mg daily as needed for swelling , Disp: , Rfl:   •  furosemide (LASIX) 40 mg tablet, Take 1 tablet (40 mg total) by mouth daily, Disp: 90 tablet, Rfl: 1  •  levothyroxine 25 mcg tablet, Take 1 tablet (25 mcg total) by mouth daily, Disp: 90 tablet, Rfl: 1  •  LORazepam (ATIVAN) 1 mg tablet, Take 0 5 mg by mouth daily at bedtime, Disp: , Rfl:   •  losartan (COZAAR) 25 mg tablet, Take 1 tablet (25 mg total) by mouth daily, Disp: 90 tablet, Rfl: 1  •  omeprazole (PriLOSEC) 40 MG capsule, Take 1 capsule (40 mg total) by mouth daily, Disp: 90 capsule, Rfl: 1  •  polyethylene glycol-propylene glycol (SYSTANE) 0 4-0 3 %, Administer 2 drops to both eyes as needed  , Disp: , Rfl:   •  potassium chloride (MICRO-K) 10 MEQ CR capsule, Take 2 capsules (20 mEq total) by mouth 3 (three) times a day (Patient taking differently: Take 20 mEq by mouth 2 (two) times a day), Disp: 540 capsule, Rfl: 0  •  prednisoLONE acetate (PRED FORTE) 1 % ophthalmic suspension, Administer 1 drop into the left eye every other day  , Disp: , Rfl:   •  pyridoxine (VITAMIN B6) 100 mg tablet, Take 100 mg by mouth daily  , Disp: , Rfl:   •  QUEtiapine (SEROquel) 100 mg tablet, Take 50 mg by mouth 2 (two) times a day, Disp: , Rfl:   •  zinc oxide 20 % ointment, Apply topically 2 (two) times a day Or as needed, Disp: 85 g, Rfl: 2  •  BABY ASPIRIN PO, Take 81 mg by mouth in the morning (Patient not taking: Reported on 6/7/2023), Disp: , Rfl:   •  calcium carbonate (TUMS EX) 750 mg chewable tablet, Chew 750 mg Three times daily as needed (Patient not taking: Reported on 6/7/2023), Disp: , Rfl:   •  erythromycin (ILOTYCIN) ophthalmic ointment, APPLY TO BOTH EYES AT BEDTIME (Patient not taking: Reported on 6/7/2023), Disp: , Rfl:   •  mirtazapine (REMERON) 7 5 MG tablet, Take 1 tablet (7 5 mg total) by mouth daily at bedtime, Disp: 90 tablet, Rfl: 1  •  Multiple Vitamins-Minerals (OCUVITE PRESERVISION PO), Take 1 tablet by mouth daily (Patient not taking: Reported on 6/7/2023), Disp: , Rfl:   •  OLANZapine (ZyPREXA ZYDIS) 5 mg dispersible tablet, DISSOLVE ONE HALF TABLET IN CHEEK NIGHTLY (Patient not taking: Reported on 6/7/2023), Disp: , Rfl:   •  OLANZapine (ZyPREXA) 5 mg tablet, Take 0 5 tablets (2 5 mg total) by mouth 2 (two) times a day (Patient not taking: Reported on 6/7/2023), Disp: 30 tablet, Rfl: 0    Allergies "  Allergen Reactions   • Gabapentin Hallucinations   • Latex Rash   • Oxycodone Nausea Only, Hallucinations and Other (See Comments)     Hallucinations  Hallucinations  Other reaction(s): Hallucinations  Hallucinations  Hallucinations  Other reaction(s): Hallucinations  Hallucinations  Other reaction(s): Hallucinations, Other (See Comments)  Hallucinations  Hallucinations  Other reaction(s): Hallucinations  Hallucinations   • Oxycodone-Acetaminophen Nausea Only and Hallucinations   • Pollen Extract Allergic Rhinitis       Social History   Past Surgical History:   Procedure Laterality Date   • COLONOSCOPY     • DENTAL SURGERY     • EYE SURGERY     • GASTRIC RESTRICTION SURGERY      for cancer   • JOINT REPLACEMENT     • KNEE SURGERY Right 01/2017    TKR   • LYMPH NODE BIOPSY     • STOMACH SURGERY       Family History   Problem Relation Age of Onset   • Heart attack Mother    • Diabetes Mother    • Heart disease Mother    • Prostate cancer Father        Objective:  /54 (BP Location: Left arm, Patient Position: Sitting, Cuff Size: Standard)   Pulse 59   Temp 97 5 °F (36 4 °C) (Temporal)   Resp 18   Ht 5' 1\" (1 549 m)   Wt 46 3 kg (102 lb)   SpO2 92%   BMI 19 27 kg/m²   Body mass index is 19 27 kg/m²  Physical Exam  Constitutional:       Appearance: She is well-developed  She is not ill-appearing or diaphoretic  HENT:      Head: Normocephalic  Right Ear: External ear normal       Left Ear: External ear normal       Nose: No rhinorrhea  Mouth/Throat:      Pharynx: No posterior oropharyngeal erythema  Eyes:      General: No scleral icterus  Pupils: Pupils are equal, round, and reactive to light  Neck:      Thyroid: No thyromegaly  Trachea: No tracheal deviation  Cardiovascular:      Rate and Rhythm: Normal rate  Rhythm irregular  Heart sounds: Murmur heard  Pulmonary:      Effort: Pulmonary effort is normal  No respiratory distress        Breath sounds: Normal breath " sounds  Chest:      Chest wall: No tenderness  Abdominal:      General: Bowel sounds are normal       Palpations: Abdomen is soft  There is no mass  Tenderness: There is no abdominal tenderness  Musculoskeletal:         General: Normal range of motion  Cervical back: Normal range of motion and neck supple  Right lower leg: No edema  Left lower leg: No edema  Lymphadenopathy:      Cervical: No cervical adenopathy  Skin:     General: Skin is warm  Neurological:      Mental Status: She is alert  Cranial Nerves: No cranial nerve deficit        Coordination: Coordination abnormal       Gait: Gait abnormal

## 2023-06-07 NOTE — ASSESSMENT & PLAN NOTE
Wt Readings from Last 3 Encounters:   06/07/23 46 3 kg (102 lb)   04/04/23 51 7 kg (114 lb)   03/27/23 51 3 kg (113 lb)       Stable on present regimen of diuretics and beta-blocker  We will continue to monitor  We will discontinue hospice care at present    Patient's daughter request   We can restart if need arises

## 2023-06-07 NOTE — ASSESSMENT & PLAN NOTE
Lab Results   Component Value Date    CREATININE 1 28 01/26/2023    CREATININE 1 21 11/29/2022    CREATININE 0 82 10/06/2022    EGFR 36 01/26/2023    EGFR 38 11/29/2022    EGFR 62 10/06/2022   Stable  We will check renal function today    Continue with present regimen

## 2023-06-15 NOTE — TELEPHONE ENCOUNTER
Patient's daughter called relaying information that patient is currently on hospice and her caregiver Sachin donnelly will giving a call to relay a message on how patient has been sleeping.

## 2023-06-16 ENCOUNTER — TELEPHONE (OUTPATIENT)
Dept: INTERNAL MEDICINE CLINIC | Facility: OTHER | Age: 88
End: 2023-06-16

## 2023-06-16 DIAGNOSIS — R41.82 ALTERED MENTAL STATUS, UNSPECIFIED ALTERED MENTAL STATUS TYPE: Primary | ICD-10-CM

## 2023-06-16 DIAGNOSIS — R35.0 URINARY FREQUENCY: ICD-10-CM

## 2023-06-16 NOTE — TELEPHONE ENCOUNTER
Maureen, patient's caregiver, called stated that is more confused than what she is at baseline, increase in urinary frequency, and feels a constant urge to void  They are requesting if we can order a UA and if they can come in to  a urine cup  Please advise

## 2023-06-20 ENCOUNTER — TELEPHONE (OUTPATIENT)
Dept: INTERNAL MEDICINE CLINIC | Facility: OTHER | Age: 88
End: 2023-06-20

## 2023-06-20 ENCOUNTER — APPOINTMENT (OUTPATIENT)
Dept: LAB | Facility: IMAGING CENTER | Age: 88
End: 2023-06-20
Payer: COMMERCIAL

## 2023-06-20 DIAGNOSIS — R41.82 ALTERED MENTAL STATUS, UNSPECIFIED ALTERED MENTAL STATUS TYPE: ICD-10-CM

## 2023-06-20 DIAGNOSIS — R35.0 URINARY FREQUENCY: ICD-10-CM

## 2023-06-20 LAB
BILIRUB UR QL STRIP: NEGATIVE
CLARITY UR: CLEAR
COLOR UR: NORMAL
GLUCOSE UR STRIP-MCNC: NEGATIVE MG/DL
HGB UR QL STRIP.AUTO: NEGATIVE
KETONES UR STRIP-MCNC: NEGATIVE MG/DL
LEUKOCYTE ESTERASE UR QL STRIP: NEGATIVE
NITRITE UR QL STRIP: NEGATIVE
PH UR STRIP.AUTO: 6 [PH]
PROT UR STRIP-MCNC: NEGATIVE MG/DL
SP GR UR STRIP.AUTO: 1.01 (ref 1–1.03)
UROBILINOGEN UR STRIP-ACNC: <2 MG/DL

## 2023-06-20 PROCEDURE — 81003 URINALYSIS AUTO W/O SCOPE: CPT

## 2023-06-20 NOTE — TELEPHONE ENCOUNTER
Patient's daughter George Gonzalez and caregiver Mandy Gonzalez came into the office to go over patient's medication list  They are trying to get Jnaett Flowers into [facility] in [place] [facility] and they asked to reconcile the medication list      Here are some things they are requesting  They asked to take off the med list:   Vitamin D3  Ciprofloxacin eye drops   Lotrisone Cream  Erythromycin eye ointment  Preservision multiple vitamin  Olanzapine  Vitamin B6    Requesting script for:  Senna OTC PRN order  Calmoseptine Ointment PRN    Changes to current order:  Levothyroxine: want order to reflect their last convesation with you of 2 pills on a Mon/Fri and 1 pill all other days  Please advise if all of this is okay to change to her med list and I can update  They would like a call back when ok'd and I can fax new med list to [facility]  Thank you!

## 2023-06-22 DIAGNOSIS — K59.00 CONSTIPATION, UNSPECIFIED CONSTIPATION TYPE: Primary | ICD-10-CM

## 2023-06-22 RX ORDER — SENNOSIDES 8.6 MG
TABLET ORAL AS NEEDED
Qty: 30 TABLET | Refills: 5 | COMMUNITY

## 2023-06-22 NOTE — TELEPHONE ENCOUNTER
Spoke with daughter Tono Sharma, patient did not get accepted at [facility] but still wanted these orders updated  Everything was updated

## 2023-07-10 ENCOUNTER — TELEPHONE (OUTPATIENT)
Dept: INTERNAL MEDICINE CLINIC | Facility: OTHER | Age: 88
End: 2023-07-10

## 2023-07-10 NOTE — TELEPHONE ENCOUNTER
Patient's daughter, Dayana King, came in with papers needed for [facility]. She says the same papers were filled out for [address] but they did not accept her.    Reprinted

## 2023-07-21 DIAGNOSIS — E03.9 HYPOTHYROIDISM, UNSPECIFIED TYPE: ICD-10-CM

## 2023-07-21 RX ORDER — LEVOTHYROXINE SODIUM 0.03 MG/1
25 TABLET ORAL DAILY
Qty: 90 TABLET | Refills: 1 | Status: SHIPPED | OUTPATIENT
Start: 2023-07-21

## 2023-08-04 ENCOUNTER — RA CDI HCC (OUTPATIENT)
Dept: OTHER | Facility: HOSPITAL | Age: 88
End: 2023-08-04

## 2023-08-04 NOTE — PROGRESS NOTES
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